# Patient Record
Sex: FEMALE | Race: WHITE
[De-identification: names, ages, dates, MRNs, and addresses within clinical notes are randomized per-mention and may not be internally consistent; named-entity substitution may affect disease eponyms.]

---

## 2017-04-17 ENCOUNTER — HOSPITAL ENCOUNTER (INPATIENT)
Dept: HOSPITAL 77 - KA.MS | Age: 82
LOS: 4 days | Discharge: NURSING, REHAB OR LONG-TERM CARE | DRG: 281 | End: 2017-04-21
Attending: PHYSICIAN ASSISTANT | Admitting: PHYSICIAN ASSISTANT
Payer: MEDICARE

## 2017-04-17 DIAGNOSIS — M31.5: ICD-10-CM

## 2017-04-17 DIAGNOSIS — I50.30: ICD-10-CM

## 2017-04-17 DIAGNOSIS — M54.6: ICD-10-CM

## 2017-04-17 DIAGNOSIS — I10: ICD-10-CM

## 2017-04-17 DIAGNOSIS — Z79.899: ICD-10-CM

## 2017-04-17 DIAGNOSIS — Z86.711: ICD-10-CM

## 2017-04-17 DIAGNOSIS — Z88.0: ICD-10-CM

## 2017-04-17 DIAGNOSIS — L03.115: ICD-10-CM

## 2017-04-17 DIAGNOSIS — B96.89: ICD-10-CM

## 2017-04-17 DIAGNOSIS — Z88.8: ICD-10-CM

## 2017-04-17 DIAGNOSIS — D63.8: ICD-10-CM

## 2017-04-17 DIAGNOSIS — E78.5: ICD-10-CM

## 2017-04-17 DIAGNOSIS — Z79.01: ICD-10-CM

## 2017-04-17 DIAGNOSIS — I51.7: Primary | ICD-10-CM

## 2017-04-17 DIAGNOSIS — F32.9: ICD-10-CM

## 2017-04-17 DIAGNOSIS — I83.019: ICD-10-CM

## 2017-04-17 DIAGNOSIS — Z86.718: ICD-10-CM

## 2017-04-17 DIAGNOSIS — I21.4: ICD-10-CM

## 2017-04-17 DIAGNOSIS — L97.919: ICD-10-CM

## 2017-04-17 DIAGNOSIS — Z79.4: ICD-10-CM

## 2017-04-17 DIAGNOSIS — R53.1: ICD-10-CM

## 2017-04-17 DIAGNOSIS — D64.9: ICD-10-CM

## 2017-04-17 DIAGNOSIS — R07.89: ICD-10-CM

## 2017-04-17 DIAGNOSIS — E03.9: ICD-10-CM

## 2017-04-17 DIAGNOSIS — E78.00: ICD-10-CM

## 2017-04-17 DIAGNOSIS — B95.7: ICD-10-CM

## 2017-04-17 DIAGNOSIS — D46.9: ICD-10-CM

## 2017-04-17 PROCEDURE — 36415 COLL VENOUS BLD VENIPUNCTURE: CPT

## 2017-04-17 PROCEDURE — 86901 BLOOD TYPING SEROLOGIC RH(D): CPT

## 2017-04-17 PROCEDURE — 86922 COMPATIBILITY TEST ANTIGLOB: CPT

## 2017-04-17 PROCEDURE — 97163 PT EVAL HIGH COMPLEX 45 MIN: CPT

## 2017-04-17 PROCEDURE — 86920 COMPATIBILITY TEST SPIN: CPT

## 2017-04-17 PROCEDURE — 84484 ASSAY OF TROPONIN QUANT: CPT

## 2017-04-17 PROCEDURE — 87186 SC STD MICRODIL/AGAR DIL: CPT

## 2017-04-17 PROCEDURE — P9016 RBC LEUKOCYTES REDUCED: HCPCS

## 2017-04-17 PROCEDURE — 36430 TRANSFUSION BLD/BLD COMPNT: CPT

## 2017-04-17 PROCEDURE — 87077 CULTURE AEROBIC IDENTIFY: CPT

## 2017-04-17 PROCEDURE — 86900 BLOOD TYPING SEROLOGIC ABO: CPT

## 2017-04-17 PROCEDURE — 30233N1 TRANSFUSION OF NONAUTOLOGOUS RED BLOOD CELLS INTO PERIPHERAL VEIN, PERCUTANEOUS APPROACH: ICD-10-PCS | Performed by: PHYSICIAN ASSISTANT

## 2017-04-17 PROCEDURE — 97597 DBRDMT OPN WND 1ST 20 CM/<: CPT

## 2017-04-17 PROCEDURE — 86850 RBC ANTIBODY SCREEN: CPT

## 2017-04-17 PROCEDURE — 87070 CULTURE OTHR SPECIMN AEROBIC: CPT

## 2017-04-17 RX ADMIN — HYDROCODONE BITARTRATE AND ACETAMINOPHEN PRN TAB: 10; 325 TABLET ORAL at 18:31

## 2017-04-17 RX ADMIN — Medication SCH TAB: at 18:31

## 2017-04-18 RX ADMIN — ASPIRIN SCH MG: 325 TABLET, DELAYED RELEASE ORAL at 08:44

## 2017-04-18 RX ADMIN — Medication SCH: at 09:31

## 2017-04-18 RX ADMIN — VITAMIN D, TAB 1000IU (100/BT) SCH: 25 TAB at 09:31

## 2017-04-18 RX ADMIN — HYDROCODONE BITARTRATE AND ACETAMINOPHEN PRN TAB: 10; 325 TABLET ORAL at 21:39

## 2017-04-18 RX ADMIN — HYDROCODONE BITARTRATE AND ACETAMINOPHEN PRN TAB: 10; 325 TABLET ORAL at 11:11

## 2017-04-18 RX ADMIN — HYDROCODONE BITARTRATE AND ACETAMINOPHEN PRN TAB: 10; 325 TABLET ORAL at 00:04

## 2017-04-18 RX ADMIN — CYANOCOBALAMIN TAB 500 MCG SCH: 500 TAB at 09:31

## 2017-04-18 RX ADMIN — OMEPRAZOLE SCH MG: 20 CAPSULE, DELAYED RELEASE ORAL at 06:24

## 2017-04-18 RX ADMIN — HYDROCODONE BITARTRATE AND ACETAMINOPHEN PRN TAB: 10; 325 TABLET ORAL at 15:59

## 2017-04-18 RX ADMIN — Medication SCH TAB: at 17:37

## 2017-04-18 NOTE — PN
04/18/2017 PATIENT NAME:  RAJANI ARAIZA

 

CHIEF COMPLAINT:  Some mid anterior substernal chest pain about 2/10, that was

present on admission per the patient report.

 

BRIEF HISTORY:  This elderly female 82 with multiple chronic medical conditions,

was seen and evaluated yesterday at [facility].  She had some concerns of

some feeling weak that she just really could not stand.  She does have a history

this.  She has mild dysplastic syndrome requiring frequent transfusions.  No

other chronic medical conditions.  She described her pain in chest as a

pressure.  She did not have any shortness of breath.  No heart palpitations.  No

nausea.  No radiating symptoms.  Hemoglobin did show an 8.0, so she was admitted

mainly for blood transfusions and a cardiac workup.

 

LAB:  This morning does have a white count of 20,000; hemoglobin 8.7; hematocrit

25.0; MCH 95, which is high; RDW significantly high 27.  .  Troponin

increased to 20.3.  INR 2.5.

 

REVIEW OF SYSTEMS:  CONSTITUTIONAL:  Appears weak, frail, elderly, sitting in

bed, however, polite, no acute distress, lucid, able to cooperate and understand

conversation.

CHEST:  She does have some ongoing chest pain.  Nitroglycerin given and

morphine.  She has no nausea.  No radiating symptoms.  Does complain of some leg

pain.

 

PHYSICAL EXAMINATION:  GENERAL:  The patient is alert and oriented.

VITAL SIGNS:  Blood pressure right now is 116/61, heart rate is around 60,

respiratory rate 14, O2 sats 96%. She is on 2 L.  She is a full code right now.

Weight is 115 pounds.

NECK:  No carotid bruits.

CV:  Grade 3/6 systolic murmur.  S1 and S2.  Regular rhythm.  No carotid bruits

noted.

RESPIRATORY:  Fibrotic crackles.  This is chronic for her.  Good distal pulses,

radial and pedal.  She has no edema.

GI:  Hypotonic bowel tones.

 

DIAGNOSTICS:  EKG this morning, Q-waves appear more inferior leads.  Chest x-ray

shows moderate cardiac enlargement, essentially unchanged.  No overt CHF

component.

 

IMPRESSION/PLAN:  Myocardial infarction, non-ST elevation myocardial infarction

now.  We will continue with aspirin.  Maximum medical conservative treatment as

the patient does have significant comorbidities.  Not likely a candidate at this

time for any invasive strategy.  However, we will determine her ischemia demand.

We will monitor her hemoglobin.  She may need another transfusion in light of

this.  Monitor for any fluid overload.  She may need beta-blocker.  Long

discussion with family and cardiologist on-call, and with the patient.  The

patient right now is in agreement to maximum medical conservative treatment for

evolving myocardial infarction.  We will trend her troponins.  We will have to

get echocardiogram at some point.  She may have to be placed in swing bed status

due to her significant weakness.  We will determine that at a later point.  Now,

we will monitor for any reperfusion arrhythmias.  While on telemetry, repeat EKG

and troponins serialized.  Other significant chronic problems include

myelodysplastic syndrome requiring frequent blood transfusions; cellulitis of

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

chronic anticoagulation; major depression; anemia of chronic disease;

musculoskeletal; thoracic back pain, she is on pain management; polymyalgia

rheumatica; thoracic aortic aneurysm; diastolic heart failure; history of giant

cell arteritis; hypertension; hyperlipidemia; hypothyroidism.

 

OVERALL PLAN:  Monitor ongoing evolving MI.  May start beta-blockers.  We will

see what her blood pressure is today.  Monitor for any reperfusion arrhythmias.

Continue with aspirin.  Keep her on telemetry today.  On discussion with the

family right now, she still wants to be full code.  Risk versus benefit was

explained to her in great detail.  The family will still be in discussion on a

day-by-day basis of this.  I explained this all to her that she is not a

candidate for any major interventions at this time.  Family is in full

cooperation and agreement with this plan.  Dr. Nishant Hurtado, informed.

 

DD:  04/18/2017 12:23:16

DT:  04/18/2017 13:07:10

Job #:  [id number]

## 2017-04-19 RX ADMIN — HYDROCODONE BITARTRATE AND ACETAMINOPHEN PRN TAB: 10; 325 TABLET ORAL at 01:34

## 2017-04-19 RX ADMIN — HYDROCODONE BITARTRATE AND ACETAMINOPHEN PRN TAB: 10; 325 TABLET ORAL at 06:07

## 2017-04-19 RX ADMIN — HYDROCODONE BITARTRATE AND ACETAMINOPHEN PRN TAB: 10; 325 TABLET ORAL at 18:23

## 2017-04-19 RX ADMIN — Medication SCH TAB: at 09:56

## 2017-04-19 RX ADMIN — OMEPRAZOLE SCH MG: 20 CAPSULE, DELAYED RELEASE ORAL at 06:06

## 2017-04-19 RX ADMIN — HYDROCODONE BITARTRATE AND ACETAMINOPHEN PRN TAB: 10; 325 TABLET ORAL at 10:30

## 2017-04-19 RX ADMIN — ASPIRIN SCH: 325 TABLET, DELAYED RELEASE ORAL at 09:12

## 2017-04-19 RX ADMIN — Medication SCH MG: at 09:13

## 2017-04-19 RX ADMIN — POTASSIUM CHLORIDE SCH MEQ: 750 TABLET, FILM COATED, EXTENDED RELEASE ORAL at 09:14

## 2017-04-19 RX ADMIN — VITAMIN D, TAB 1000IU (100/BT) SCH UNITS: 25 TAB at 09:13

## 2017-04-19 RX ADMIN — Medication SCH TAB: at 09:12

## 2017-04-19 RX ADMIN — HYDROCODONE BITARTRATE AND ACETAMINOPHEN PRN TAB: 10; 325 TABLET ORAL at 13:50

## 2017-04-19 RX ADMIN — Medication SCH TAB: at 18:23

## 2017-04-19 RX ADMIN — CYANOCOBALAMIN TAB 500 MCG SCH MCG: 500 TAB at 09:13

## 2017-04-19 NOTE — PN
04/19/2017 PATIENT NAME:  RAJANI ARAIZA

 

CHIEF COMPLAINT:  Right lower lobe extremity pain with wound ulceration.

However, she denies chest pain, denies shortness of breath, denies cough.  Her

denying chest pain is promising.

 

HISTORY:  This 82-year-old female, who does have multiple chronic medical

conditions, was admitted for some weakness.  It turned out she had a myocardial

infarction, a quite severe troponin still around 20.  Yesterday, she had a low-

grade anterior substernal chest wall pain, nonradiating, it was relieved with

nitroglycerin and morphine.

 

PHYSICAL EXAMINATION:  VITAL SIGNS:  Temperature is 97.8, blood pressure 108/58,

heart rate 60, respiratory rate 16 and O2 sats 93%, she is on 2 L.  She is

weighing 119, this is up approximately 4 pounds.

NECK:  She has no carotid bruits.  No JVD.

CV:  Grade 3/6 systolic murmur.  S1 and S2.  She has a regular rhythm.

RESPIRATORY:  Fibrotic crackles, chronic for her.

EXTREMITIES:  Overall general skin condition is warm and perfusing well.  She

does have two ulcerations in her right lower extremity.  I viewed the ulcers

this morning that do have some soft slough noted with no purulent drainage, but

serosanguineous.  Partial thickness noted.  The dressing type is a Hydrogel with

an Aquacel Hydrofiber.  No palpable cord in her right calf.  Fair distal pulses.

No edema noted.

GI:  Hypotonic bowel tones.  Nontender.

 

LABORATORY DATA:  Troponin is 20.5, slightly up from yesterday.  BNP was 938.

Telemetry reports no ectopy noted.

 

ASSESSMENT AND PLAN:

1. Myocardial infarction, now considered non-ST-elevation myocardial

    infarction.  We will reduce aspirin from 325 to 81 and continue with Plavix

    75.  We will start low-dose beta-blocker, a tartrate, 12.5 mg to see how

    her blood pressure does and saline lock her fluids.  She will continue on

    telemetry at this time.  Continue with maximum medical conservative

    treatment.  I do not believe she has any ischemia ongoing at this time.

2. Weakness.  Hold off on any rehab right at this time or swing bed, but this

    was discussed with her.  She may have to be placed in swing bed at a

    certain point.

3. Venous stasis ulcers, right lower extremity.  These are getting debrided by

    Physical Therapy.

 

SECONDARY ASSESSMENT:

1. Diastolic heart failure.  We will have to assess her echocardiogram.  She

    may need repeat since her myocardial infarction.

2. History of cellulitis, right lower extremity.

3. Bilateral pulmonary embolism history.  She is on chronic anticoagulation.

    Therapeutic INR.

4. Major depression.

5. Anemia, which is chronic disease.

6. Thoracic back pain.  She is on pain management contract.

7. Polymyalgia rheumatica.

8. History of thoracic aortic aneurysm.

9. History of giant cell arteritis.  She has no headache at this time.

10.Hypertension history.  Actually, the blood pressure is slightly low.  We

    will start a beta-blocker.

11.Hyperlipidemia.  In light of her myocardial infarction, we will start

    statin therapy independent of her cholesterol numbers as she made gain

    benefit from this.

12.Hypothyroidism.  We will assess TSH level.

 

OVERALL PLAN:  Start statin therapy.  Reduce aspirin.  Saline lock fluids.  20

mg Lasix x1 this morning.  Start a low-dose beta-blocker.  Continue with

telemetry.  Discussion with family this morning, everybody is in agreement to

the patient's plan.  She is continuing to be full code status.

 

DD:  04/19/2017 09:36:48

DT:  04/19/2017 10:30:18

Job #:  [id number]

## 2017-04-20 LAB
CHLORIDE SERPL-SCNC: 105 MMOL/L (ref 98–115)
SODIUM SERPL-SCNC: 140 MMOL/L (ref 136–145)

## 2017-04-20 RX ADMIN — HYDROCODONE BITARTRATE AND ACETAMINOPHEN PRN TAB: 10; 325 TABLET ORAL at 06:22

## 2017-04-20 RX ADMIN — Medication SCH TAB: at 09:00

## 2017-04-20 RX ADMIN — VITAMIN D, TAB 1000IU (100/BT) SCH UNITS: 25 TAB at 09:05

## 2017-04-20 RX ADMIN — OMEPRAZOLE SCH MG: 20 CAPSULE, DELAYED RELEASE ORAL at 06:21

## 2017-04-20 RX ADMIN — Medication SCH MG: at 09:05

## 2017-04-20 RX ADMIN — CYANOCOBALAMIN TAB 500 MCG SCH MCG: 500 TAB at 09:00

## 2017-04-20 RX ADMIN — HYDROCODONE BITARTRATE AND ACETAMINOPHEN PRN TAB: 10; 325 TABLET ORAL at 00:43

## 2017-04-20 RX ADMIN — Medication SCH TAB: at 17:21

## 2017-04-20 RX ADMIN — HYDROCODONE BITARTRATE AND ACETAMINOPHEN PRN TAB: 10; 325 TABLET ORAL at 20:44

## 2017-04-20 NOTE — PN
04/20/2017 PATIENT NAME:  RAJANI ARAIZA

 

CHIEF COMPLAINT:  Today, overall progress.  Feels good.  Slightly weak.  Denies

any chest pain.  No worsening shortness of breath.  Her weight is up

approximately 6 pounds since admission.  Troponin is trending down.  Overall

much improvement.

 

HISTORY:  This 82-year-old female with multiple chronic medical conditions was

admitted initially for weakness.  She sustained a considerable myocardial

infarction.  Troponin was around 20, is now decreasing, so she was admitted for

conservative maximal medical treatment of her myocardial infarction.

 

The patient is full code.

 

PHYSICAL EXAMINATION:  VITAL SIGNS:  Blood pressure 104/50; respiratory rate 19;

O2 sats 95%, this is on 2 L; and temperature is 99.3.

CONSTITUTIONAL:  The patient is alert and oriented.  Sitting in chair.

Conversing with the family.

NECK:  Supple.  No signs of JVD.  No carotid bruits.

CV:  Grade 3/6 systolic murmur.  Normal S1, S2.  Irregular rhythm.

RESPIRATORY:  She has had ongoing chronic fibrotic crackles which are chronic

for her.  She is on Lasix every other day.

GI:  Nontender.  Decent bowel tones.

EXTREMITIES:  Lower extremity; she does have a dressing intact, hydrofiber

dressing to her right lower extremity due to stasis.  Culture sensitive reports

are back.  She has no edema.

 

LABORATORY DATA:

 

 

INR is 2.6.  Sodium 140, potassium 4.2, BUN 30, creatinine 0.78, troponin now is

12.9, and calcium 8.6.  BNP of 938.

 

Telemetry reports; nurses did not report any aberrancy or PVCs; however, they

did hold last night's metoprolol tartrate due to hypotension, asymptomatic.

 

ASSESSMENT AND PLAN:

1. Status post myocardial infarction.  At this time non-ST-elevation

    myocardial infarction.  She is to continue with aspirin 81 mg.  Placed on

    Plavix on admission.  Continue with low-dose beta-blocker tartrate 12.5 mg

    b.i.d.  Holding parameters.  Continue with telemetry.  Echocardiogram, next

    week.  She is fully coagulated with Coumadin.  Starting statin therapy

    today.  NICHOLAS risk score, elevated 6/7; however, we will continue with

    ischemia driven strategy, maximal medical treatment.

2. Weakness.  Physical Therapy consultation has been placed.  Most likely she

    could start physical therapy tomorrow; however, has ongoing wound

    debridement.

3. Venous stasis ulcers.  Right lower extremities.  Physical Therapy is doing

    dressing changes.  Currently, she does have a hydrocele dressing.  Wound

    culture was taken, Klebsiella oxytoca and Staph coagulase are negative.

    Sensitive to ceftriaxone.  We will continue with ceftriaxone.

 

SECONDARY ASSESSMENT:

1. Diastolic heart failure.  Echocardiogram next week.

2. History of cellulitis.

3. Bilateral pulmonary embolism history.  She is on chronic anticoagulation.

    Right now, she has a therapeutic INR.

4. Major depression.  This seems stable.

5. Anemia of chronic disease.

6. Thoracic back pain.  She is currently on a pain management contract.  This

    is well controlled.

7. Polymyalgia rheumatica.

8. History of thoracic aortic aneurysm.

9. History of giant cell arteritis.

10.History of hypertension, currently it is slightly low; however, ongoing

    beta-blockers.

11.Hyperlipidemia.  We will start statin therapy today as she could benefit

    due to recent myocardial infarction.

12.Hypothyroidism.  TSH pending.

 

OVERALL PLAN:  Start statin therapy today.  Lasix 40 mg x1.  Continue with beta-

blocker.  Holding parameters.  Continue with telemetry for 24 more hours.  She

is still a DNR; however, she is doing much better.  We will get an

echocardiogram next week, and most likely, we will place her in swing bed

therapy tomorrow.

 

DD:  04/20/2017 09:52:44

DT:  04/20/2017 10:13:28

Job #:  [id number]

## 2017-04-21 ENCOUNTER — HOSPITAL ENCOUNTER (INPATIENT)
Dept: HOSPITAL 77 - KA.MS | Age: 82
LOS: 14 days | Discharge: HOME | DRG: 947 | End: 2017-05-05
Attending: NURSE PRACTITIONER | Admitting: NURSE PRACTITIONER
Payer: MEDICARE

## 2017-04-21 VITALS — SYSTOLIC BLOOD PRESSURE: 97 MMHG | DIASTOLIC BLOOD PRESSURE: 43 MMHG

## 2017-04-21 DIAGNOSIS — B96.89: ICD-10-CM

## 2017-04-21 DIAGNOSIS — T40.2X5A: ICD-10-CM

## 2017-04-21 DIAGNOSIS — F32.9: ICD-10-CM

## 2017-04-21 DIAGNOSIS — M31.5: ICD-10-CM

## 2017-04-21 DIAGNOSIS — I83.218: ICD-10-CM

## 2017-04-21 DIAGNOSIS — D64.9: ICD-10-CM

## 2017-04-21 DIAGNOSIS — I21.3: ICD-10-CM

## 2017-04-21 DIAGNOSIS — R53.1: Primary | ICD-10-CM

## 2017-04-21 DIAGNOSIS — Z99.81: ICD-10-CM

## 2017-04-21 DIAGNOSIS — M54.6: ICD-10-CM

## 2017-04-21 DIAGNOSIS — E03.9: ICD-10-CM

## 2017-04-21 DIAGNOSIS — Z79.01: ICD-10-CM

## 2017-04-21 DIAGNOSIS — E78.5: ICD-10-CM

## 2017-04-21 DIAGNOSIS — Z79.899: ICD-10-CM

## 2017-04-21 DIAGNOSIS — Z86.711: ICD-10-CM

## 2017-04-21 DIAGNOSIS — K59.03: ICD-10-CM

## 2017-04-21 PROCEDURE — P9016 RBC LEUKOCYTES REDUCED: HCPCS

## 2017-04-21 RX ADMIN — Medication SCH TAB: at 18:15

## 2017-04-21 RX ADMIN — OMEPRAZOLE SCH MG: 20 CAPSULE, DELAYED RELEASE ORAL at 06:56

## 2017-04-21 RX ADMIN — Medication SCH TAB: at 07:55

## 2017-04-21 RX ADMIN — VITAMIN D, TAB 1000IU (100/BT) SCH UNITS: 25 TAB at 08:03

## 2017-04-21 RX ADMIN — HYDROCODONE BITARTRATE AND ACETAMINOPHEN PRN TAB: 10; 325 TABLET ORAL at 16:05

## 2017-04-21 RX ADMIN — Medication SCH MG: at 08:03

## 2017-04-21 RX ADMIN — CYANOCOBALAMIN TAB 500 MCG SCH MCG: 500 TAB at 08:02

## 2017-04-21 RX ADMIN — HYDROCODONE BITARTRATE AND ACETAMINOPHEN PRN TAB: 10; 325 TABLET ORAL at 21:01

## 2017-04-21 RX ADMIN — POTASSIUM CHLORIDE SCH MEQ: 750 TABLET, FILM COATED, EXTENDED RELEASE ORAL at 08:00

## 2017-04-21 RX ADMIN — HYDROCODONE BITARTRATE AND ACETAMINOPHEN PRN TAB: 10; 325 TABLET ORAL at 12:07

## 2017-04-21 RX ADMIN — HYDROCODONE BITARTRATE AND ACETAMINOPHEN PRN TAB: 10; 325 TABLET ORAL at 07:53

## 2017-04-21 NOTE — PCM.DCSUM1
**Discharge Summary





- Hospital Course


Brief History: This 82-year-old female was admitted from the [facility] 

mainly due to weakness and low-grade substernal chest pain and it was noted 

that she did have a myocardial infarction while hospitalized. Her troponin 

level was quite high around 20 and she remained full code. Cardiology [place] [facility] 

call was notified and there was an agreement that she be treated with maximal 

medical conservative treatment.





- Discharge Data


Discharge Date: 04/21/17


Discharge Disposition: DC/Tfer W/I Hosp To Swing 61


Condition: Good





- Patient Summary/Data


Complications: Patient's complications included a quite significant myocardial 

infarction.


Consults: 


 Consultations





04/17/17 14:42


Consult to Physical Therapy [PT Evaluation and Treatment] [CONS] Routine 











Hospital Course: 


Patient's hospital course went fairly well although she did have a significant 

myocardial infarction, however do to comorbidities it was determined best to 

treat the patient for ischemia demand driven protocol with maximal medical 

conservative treatment and the family did agree with this. She did have a low 

blood pressure early on with troponin level of about 20. Beta blockers were 

started and this did improve her blood pressure. She did get quite weak during 

her hospital stay and a physical therapy consult was placed and the patient 

could benefit from strengthening and ambulation. She remained on telemetry and 

we had no reperfusion arrhythmias or PVCs noted. We continued aspirin, and 

Coumadin. Physical therapy did assist with venous stasis ulcer debridement and 

wound debridement. She did have Klebsiella oxytocin staph coagulase negative 

from her venous stasis ulcer right lower extremity and she was treated with 

sensitive to ceftriaxone.  Statin therapy was started








- Discharge Plan


Home Medications: 


 Home Meds





Cholecalciferol (Vitamin D3) [Vitamin D3] 2,000 unit PO DAILY 12/27/13 [History]


Cyanocobalamin (Vitamin B-12) [B-12 Dots] 500 mcg PO MOTUWETHFR@0900 12/27/13 [

History]


Hydrocodone/Acetaminophen [Hydrocodon-Acetaminophn ] 1 tab PO Q6H PRN 12/ 27/13 [History]


Omeprazole 20 mg PO ACBREAKFAST 12/27/13 [History]


Warfarin [Coumadin] 5 mg PO DAILY@1800 09/02/15 [History]


Polyethylene Glycol 3350 [MiraLAX] 1 packet PO DAILY PRN 09/30/15 [History]


Calcium Carbonate/Vitamin D3 [Calcium 600-Vit D3 400 Tablet] 1 tab PO BIDMEALS 

12/01/15 [History]


Melatonin/Pyridoxine HCl (B6) [Melatonin 3 mg Tablet] 3 mg PO BEDTIME PRN 12/01/

15 [History]


Mirtazapine [Remeron] 30 mg PO BEDTIME 04/29/16 [History]


Phytonadione [Vitamin K] 100 mcg PO DAILY 04/29/16 [History]


Pyridoxine HCl [Vitamin B-6] 100 mg PO DAILY 04/29/16 [History]


Lactobacillus Acidophilus [Acidophilus] 1 tab PO DAILY 08/03/16 [History]


predniSONE [Prednisone] 5 mg PO DAILY@1800 08/03/16 [History]


Levothyroxine Sodium [Synthroid] 150 mcg PO ACBREAKFAST 01/20/17 [History]


Methylphenidate HCl [Ritalin] 5 mg PO ACBREAKFAST 01/20/17 [History]


Potassium Chloride [Klor-Con M20] 10 meq PO Q2D 03/25/17 [History]


Denosumab [Prolia] 60 mg SUBCUT ASDIRECTED 04/17/17 [History]


Epoetin Dewayne [Procrit] 20,000 units SUBCUT Q7D 04/17/17 [History]


Epoetin Dewayne [Procrit] 40,000 unit IJ Q7D 04/17/17 [History]


Filgrastim-Sndz [Zarxio] 300 mcg SUBCUT Q7D 04/17/17 [History]


Furosemide [Lasix] 40 mg PO Q2D 04/17/17 [History]











- Discharge Summary/Plan Comment


DC Time >30 min.: Yes


Discharge Summary/Plan Comment: 





FINAL DIAGNOSES


Myocardial infarction, questionable initial STEMI versus NSTEMI, 


Diastolic heart failure, will get echocardiogram next week


Myelodysplastic syndrome


Venous stasis ulcer, right lower extremity,


History of bilateral pulmonary embolism


Chronic anticoagulation


Anemia of chronic disease 


polymyalgia rheumatica


Hyperlipidemia hypertension


Hypothyroidism


Thoracic back pain


Opioid-induced constipation


Depression








- Patient Data


Vitals - Most Recent: 


 Last Vital Signs











Temp  97.9 F   04/21/17 11:00


 


Pulse  56 L  04/21/17 11:00


 


Resp  20   04/21/17 11:00


 


BP  97/43 L  04/21/17 11:00


 


Pulse Ox  97   04/21/17 11:00











Weight - Most Recent: 119 lb 1.6 oz


I&O - Last 24 hours: 


 Intake & Output











 04/20/17 04/21/17 04/21/17





 22:59 06:59 14:59


 


Intake Total 270 50 


 


Output Total 650 400 


 


Balance -380 -350 











Lab Results - Last 24 hrs: 


 Laboratory Results - last 24 hr











  04/21/17 04/21/17 04/21/17 Range/Units





  07:00 07:00 07:00 


 


WBC   4.1 L   (5.0-10.0)  10^3/uL


 


RBC   2.42 L   (3.80-5.50)  10^6/uL


 


Hgb   8.0 L   (12.0-16.0)  g/dL


 


Hct   23.5 L   (37.0-47.0)  %


 


MCV   96.8 H   (82.0-92.0)  fL


 


MCH   33.1 H   (27.0-31.0)  pg


 


MCHC   34.2   (32.0-36.0)  g/dL


 


RDW   27.3 H   (11.5-14.5)  %


 


Plt Count   189   (150-300)  10^3/uL


 


MPV   9.9   (7.4-10.4)  fL


 


Neut % (Auto)   43.5 L   (50.0-70.0)  %


 


Lymph % (Auto)   36.5   (20.0-40.0)  %


 


Mono % (Auto)   9.5 H   (2.0-8.0)  %


 


Eos % (Auto)   9.6 H   (1.0-3.0)  %


 


Baso % (Auto)   0.9   (0.0-1.0)  %


 


Neut # (Auto)   1.8 L   (2.5-7.0)  10^3/uL


 


Lymph # (Auto)   1.5   (1.0-4.0)  10^3/uL


 


Mono # (Auto)   0.4   (0.1-0.8)  10^3/uL


 


Eos # (Auto)   0.4 H   (0.1-0.3)  10^3/uL


 


Baso # (Auto)   0.0   (0.0-0.1)  10^3/uL


 


PT    23.2 H  (8.9-11.4)  SEC


 


INR    2.2 H  (0.9-1.1)  


 


Troponin I  8.19 H*    (0.00-0.070)  ng/mL











Med Orders - Current: 


 Current Medications








Discontinued Medications





Hydrocodone Bitart/Acetaminophen (Norco 325-5 Mg)  1 tab PO ONETIME ONE


   Stop: 04/17/17 14:55


   Last Admin: 04/17/17 15:07 Dose:  1 tab


Hydrocodone Bitart/Acetaminophen (Norco 325-10 Mg)  1 tab PO Q6H PRN


   PRN Reason: Pain


   Last Admin: 04/18/17 11:11 Dose:  1 tab


Hydrocodone Bitart/Acetaminophen (Norco 325-10 Mg)  1 tab PO Q4H PRN


   PRN Reason: Pain


   Last Admin: 04/21/17 12:07 Dose:  1 tab


Aspirin (Aspirin)  324 mg PO ONETIME ONE


   Stop: 04/17/17 14:33


   Last Admin: 04/17/17 14:43 Dose:  324 mg


Aspirin (Ecotrin)  325 mg PO DAILY [facility]


   Last Admin: 04/19/17 09:12 Dose:  Not Given


Aspirin (Ecotrin)  81 mg PO DAILY [facility]


Aspirin (Halfprin)  81 mg PO DAILY [facility]


   Last Admin: 04/21/17 08:00 Dose:  81 mg


Atropine Sulfate (Atropine 0.1 Mg/Ml)  0 mg IVPUSH ASDIRECTED PRN


   PRN Reason: Heart


Calcium Citrate (Calcium Citrate + D)  2 tab PO BIDMEALS [facility]


   Last Admin: 04/21/17 07:55 Dose:  2 tab


Ceftriaxone Sodium (Rocephin)  1 gm IVPUSH Q24H [facility]


   Last Admin: 04/20/17 20:06 Dose:  1 gm


Cholecalciferol (Vitamin D3)  2,000 units PO DAILY [facility]


   Last Admin: 04/21/17 08:03 Dose:  2,000 units


Clopidogrel Bisulfate (Plavix)  75 mg PO DAILY [facility]


   Last Admin: 04/21/17 08:02 Dose:  75 mg


Cyanocobalamin (Vitamin B12)  500 mcg PO MOTUWETHFR@0900 [facility]


   Last Admin: 04/21/17 08:02 Dose:  500 mcg


Epinephrine HCl (Epinephrine 1:10,000)  1 mg IVPUSH ASDIRECTED PRN


   PRN Reason: Heart


Furosemide (Lasix)  40 mg PO Q2D [facility]


   Last Admin: 04/21/17 08:00 Dose:  40 mg


Furosemide (Lasix)  20 mg IVPUSH NOW ONE


   Stop: 04/19/17 10:02


   Last Admin: 04/19/17 10:15 Dose:  20 mg


Furosemide (Lasix)  40 mg IVPUSH NOW ONE


   Stop: 04/20/17 10:01


   Last Admin: 04/20/17 11:05 Dose:  40 mg


Sodium Chloride (Normal Saline)  1,000 mls @ 125 mls/hr IV ASDIRECTED [facility]


   Stop: 04/17/17 17:00


   Last Admin: 04/17/17 16:02 Dose:  125 mls/hr


Sodium Chloride (Normal Saline)  1,000 mls @ 30 mls/hr IV ASDIRECTED [facility]


   Last Admin: 04/17/17 20:09 Dose:  30 mls/hr


Sodium Chloride (Normal Saline)  1,000 mls @ 70 mls/hr IV ASDIRECTED [facility]


   Last Admin: 04/19/17 07:38 Dose:  70 mls/hr


Lactobacillus Acidophilus/Rhamnosus (Multi-Cynthia Plus)  1 cap PO DAILY [facility]


   Last Admin: 04/21/17 08:02 Dose:  1 cap


Levothyroxine Sodium (Synthroid)  150 mcg PO ACBREAKFAST [facility]


   Last Admin: 04/21/17 06:56 Dose:  150 mcg


Lidocaine HCl (Xylocaine 2%)  0 mg IVPUSH ASDIRECTED PRN


   PRN Reason: Heart


Melatonin (Melatonin)  3 mg PO BEDTIME PRN


   PRN Reason: Insomnia


Methylphenidate HCl (Ritalin)  5 mg PO ACBREAKFAST [facility]


   Last Admin: 04/20/17 06:21 Dose:  5 mg


Metoprolol Tartrate (Lopressor)  12.5 mg PO BID [facility]


   Last Admin: 04/21/17 08:01 Dose:  12.5 mg


Mirtazapine (Remeron)  30 mg PO BEDTIME [facility]


   Last Admin: 04/20/17 20:43 Dose:  30 mg


Morphine Sulfate (Morphine)  1 mg IVPUSH Q2H PRN


   PRN Reason: Chest Pain


   Last Admin: 04/18/17 10:07 Dose:  1 mg


Nitroglycerin (Nitrostat)  0.4 mg SL ASDIRECTED PRN


   PRN Reason: Heart


   Last Admin: 04/18/17 08:45 Dose:  0.4 mg


Omeprazole (Omeprazole)  20 mg PO ACBREAKFAST [facility]


   Last Admin: 04/21/17 06:56 Dose:  20 mg


Ondansetron HCl (Zofran)  4 mg IVPUSH Q4H PRN


   PRN Reason: Nausea/Vomiting


Phytonadione (Vitamin K)  100 mcg PO DAILY [facility]


   Last Admin: 04/21/17 08:03 Dose:  100 mcg


Polyethylene Glycol (Miralax)  17 gm PO DAILY PRN


   PRN Reason: Constipation


Potassium Chloride (Klor-Con 10)  10 meq PO Q2D [facility]


   Last Admin: 04/21/17 08:00 Dose:  10 meq


Prednisone (Prednisone)  5 mg PO DAILY@1800 [facility]


   Last Admin: 04/20/17 17:22 Dose:  5 mg


Pyridoxine HCl (Vitamin B6-Pyridoxine)  100 mg PO DAILY [facility]


   Last Admin: 04/21/17 08:03 Dose:  100 mg


Sodium Chloride (Syrex Flush)  5 ml FLUSH Q8HR PRN


   PRN Reason: Keep Vein Open


Sodium Chloride (Syrex Flush)  5 ml FLUSH Q8HR PRN


   PRN Reason: Keep Vein Open


   Last Admin: 04/19/17 20:56 Dose:  5 ml


Warfarin Sodium (Coumadin)  5 mg PO DAILY@1800 [facility]


   Last Admin: 04/20/17 17:22 Dose:  5 mg











*Q Meaningful Use (DIS)





- VTE *Q


VTE Criteria *Q: 








- Stroke *Q


Stroke Criteria *Q: 








- AMI *Q


AMI Criteria *Q:

## 2017-04-21 NOTE — PCM.HP
H&P History of Present Illness





- General


Date of Service: 17


Admit Problem/Dx: 


 Admission Diagnosis/Problem





Admission Diagnosis/Problem      Weakness








Source of Information: Patient, Old records, Provider, RN


History Limitations: Reports: No limitations





- History of Present Illness


Initial Comments - Free Text/Narative: 





This 82-year-old female was initially admitted for chest pain and weakness and 

was determined for her to have a myocardial infarction quite significant. Due 

to her weakness status post MI she was placed in swing bed therapy.





- Related Data


Allergies/Adverse Reactions: 


 Allergies











Allergy/AdvReac Type Severity Reaction Status Date / Time


 


erythromycin base Allergy Severe Vomiting Verified 17 16:42





[Erythromycin Base]     


 


Penicillins Allergy Unknown Hives Verified 17 16:42


 


Sulfa (Sulfonamide Allergy  Other Verified 17 16:42





Antibiotics)     


 


sulfamethoxazole Allergy  Other Verified 17 16:42





[From Bactrim]     


 


trimethoprim [From Bactrim] Allergy  Other Verified 17 16:42


 


lenalidomide [From Revlimid] AdvReac Mild Rash Verified 17 16:42











Home Medications: 


 Home Meds





Cholecalciferol (Vitamin D3) [Vitamin D3] 2,000 unit PO DAILY 13 [History]


Cyanocobalamin (Vitamin B-12) [B-12 Dots] 500 mcg PO MOTUWETHFR@0900 13 [

History]


Hydrocodone/Acetaminophen [Hydrocodon-Acetaminophn ] 1 tab PO Q6H PRN  [History]


Omeprazole 20 mg PO ACBREAKFAST 13 [History]


Warfarin [Coumadin] 5 mg PO DAILY@1800 09/02/15 [History]


Polyethylene Glycol 3350 [MiraLAX] 1 packet PO DAILY PRN 09/30/15 [History]


Calcium Carbonate/Vitamin D3 [Calcium 600-Vit D3 400 Tablet] 1 tab PO BIDMEALS 

12/01/15 [History]


Melatonin/Pyridoxine HCl (B6) [Melatonin 3 mg Tablet] 3 mg PO BEDTIME PRN 12/01/

15 [History]


Mirtazapine [Remeron] 30 mg PO BEDTIME 16 [History]


Phytonadione [Vitamin K] 100 mcg PO DAILY 16 [History]


Pyridoxine HCl [Vitamin B-6] 100 mg PO DAILY 16 [History]


Lactobacillus Acidophilus [Acidophilus] 1 tab PO DAILY 16 [History]


predniSONE [Prednisone] 5 mg PO DAILY@1800 16 [History]


Levothyroxine Sodium [Synthroid] 150 mcg PO ACBREAKFAST 17 [History]


Methylphenidate HCl [Ritalin] 5 mg PO ACBREAKFAST 17 [History]


Potassium Chloride [Klor-Con M20] 10 meq PO Q2D 17 [History]


Denosumab [Prolia] 60 mg SUBCUT ASDIRECTED 17 [History]


Epoetin Dewayne [Procrit] 20,000 units SUBCUT Q7D 17 [History]


Epoetin Dewayne [Procrit] 40,000 unit IJ Q7D 17 [History]


Filgrastim-Sndz [Zarxio] 300 mcg SUBCUT Q7D 17 [History]


Furosemide [Lasix] 40 mg PO Q2D 17 [History]











Past Medical History


HEENT History: Reports: Cataract, Hard of hearing, Impaired vision, Sinusitis


Cardiovascular History: Reports: Blood clots/VTE/DVT, High cholesterol, PVD, 

SOB on exertion


Respiratory History: Reports: Bronchitis, recurrent, PE, Pneumonia, recurrent, 

SOB, Other (see below)


Other Respiratory History: Chronic use of oxygen


Gastrointestinal History: Reports: Bowel obstruction, Chronic diarrhea, Fecal 

incontinence, GERD


Genitourinary History: Reports: UTI, recurrent


OB/GYN History: Reports: Endometriosis, Pregnancy


Musculoskeletal History: Reports: Back pain, chronic, Fibromyalgia, Other (see 

below)


Other Musculoskeletal History: POLYMYALGIA


Neurological History: Reports: Vertigo


Psychiatric History: Reports: None


Endocrine/Metabolic History: Reports: Hypothyroidism, Osteopenia, Osteoporosis


Hematologic History: Reports: Anemia, Blood transfusion(s)


Immunologic History: Reports: None


Oncologic (Cancer) History: Reports: None


Dermatologic History: Reports: None





- Infectious Disease History


Infectious Disease History: Reports: Measles, Mumps





- Past Surgical History


HEENT Surgical History: Reports: Adenoidectomy, Cataract surgery, Tonsillectomy


Cardiovascular Surgical History: Reports: None


Respiratory Surgical History: Reports: None


GI Surgical History: Reports: Appendectomy, Cholecystectomy, Colonoscopy, Other 

(see below)


Other GI Surgeries/Procedures: bowel obstruction


Female  Surgical History: Reports: Hysterectomy


Endocrine Surgical History: Reports: None


Neurological Surgical History: Reports: None


Musculoskeletal Surgical History: Reports: None


Oncologic Surgical History: Reports: None





Social & Family History





- Family History


Family Medical History: Noncontributory


HEENT: Reports: None


Cardiac: Reports: None


Respiratory: Reports: None


GI: Reports: None


: Reports: Other (see below) (Mother  with kidney disease unknown type

)


OBGYN: Reports: None


Musculoskeletal: Reports: None


Neurological: Reports: None


Psychiatric: Reports: None


Endocrine/Metabolic: Reports: None


Hematologic: Reports: None


Immunologic: Reports: None


Dermatologic: Reports: None


Oncologic: Reports: Hodgkin's lymphoma





- Tobacco Use


Smoking Status *Q: Never Smoker


Second Hand Smoke Exposure: No





- Caffeine Use


Caffeine Use: Reports: Coffee





- Alcohol Use


Days Per Week of Alcohol Use: 0





- Recreational Drug Use


Recreational Drug Use: No





H&P Review of Systems





- Review of Systems:


Review Of Systems: See Below


General: Reports: weakness, fatigue, weight loss (Do to diuretic therapy)


HEENT: Reports: no symptoms


Pulmonary: Reports: Shortness of Breath.  Denies: Cough, Sputum


Cardiovascular: Reports: dyspnea on exertion.  Denies: chest pain, edema, blood 

pressure problem


Gastrointestinal: Reports: Constipation


Genitourinary: Reports: no symptoms


Musculoskeletal: Reports: leg pain (Leg pain due to wound much improved)


Skin: Reports: wound (Right lower extremity)


Psychiatric: Reports: no symptoms


Neurological: Reports: Difficulty Walking, Weakness.  Denies: Dizziness


Hematologic/Lymphatic: Reports: anemia, easy bleeding


Immunologic: Reports: no symptoms





Exam





- Exam


Exam: See Below





- Vital Signs


Vital Signs: 


 Last Vital Signs











Temp  97.9 F   17 11:00


 


Pulse  56 L  17 11:00


 


Resp  20   17 11:00


 


BP  97/43 L  17 11:00


 


Pulse Ox  97   17 11:00











Weight: 119 lb 1.6 oz





- Exam


Quality Assessment: supplemental oxygen (Has been on chronic home oxygen for 

about 6 weeks)


General: alert, oriented, cooperative.  No: mild distress


HEENT: PERRLA, Hearing intact, Mucosa moist & pink, Nares patent, Normal nasal 

septum, Posterior pharynx clear, Conjunctiva clear, EOMI, EACs clear, TMs clear


Neck: supple, trachea midline.  No: JVD


Lungs: Crackles (Chronic fibrotic crackles).  No: Rhonchi, Wheezing


Cardiovascular: regular rate, regular rhythm, systolic murmur (3/6 systolic 

murmur).  No: tachycardia


Abdomen: hypoactive bowel sounds


 (Female) Exam: Deferred


Rectal (Female) Exam: Deferred


Back Exam: No: CVA tenderness (L), CVA tenderness (R)


Extremities: normal pulses


Peripheral Pulses: 2+: radial (L), radial (R)


Skin: wound, incision (Healings venous stasis ulcer, requiring debridement 

little drainage)


Neurological: reflexes equal bilateral, normal speech, sensation intact


Neuro Extensive - Mental Status: alert, oriented x3, normal mood/affect, normal 

cognition


Neuro Extensive - Motor, Sensory, Reflexes: CN II-XII intact.  No: normal gait, 

ataxia, hemiplagia (L), hemiplagia (R)


Psychiatric: alert, normal affect, normal mood





*Q Meaningful Use (ADM)





- VTE *Q


VTE Criteria *Q: 








- Stroke *Q


Stroke Criteria *Q: 








- AMI *Q


AMI Criteria *Q: 





Problem List Initiated/Reviewed/Updated: Yes


Orders Last 24hrs: 


 Active Orders 24 hr











 Category Date Time Status


 


 Patient Status [ADT] Routine ADT  17 13:05 Ordered


 


 Communication Order [RC] DAILY Care  17 12:58 Active


 


 Communication Order [RC] Q8HR Care  17 12:58 Active


 


 Intake and Output [RC] 1400,2200,0600 Care  17 12:58 Active


 


 Oxygen Therapy [RC] PRN Care  17 12:58 Active


 


 RT Incentive Spirometry [RC] ASDIRECTED Care  17 12:58 Active


 


 Supplement (Dietary) [Dietary Supplements] [RC] 1400, Care  17 12:58 

Active





 2200   


 


 Up With Assistance [RC] ASDIRECTED Care  17 12:58 Active


 


 Consult to Physical Therapy [PT Evaluation and Cons  17 12:58 Active





 Treatment] [CONS] Routine   


 


 Heart Healthy Diet [DIET] Diet  17 Lunch Active


 


 Acetaminophen/HYDROcodone [Norco 325-10 MG] Med  17 12:58 Ordered





 1 tab PO Q4H PRN   


 


 Aspirin [Halfprin] Med  17 09:00 Ordered





 81 mg PO DAILY   


 


 B.Bif/B.Long/L.Acidoph/L.Rhamn [Multi-Cynthia Plus] Med  17 09:00 Ordered





 1 cap PO DAILY   


 


 Calcium Citrate/Vitamin D3 [Calcium Citrate + D] Med  17 18:00 Ordered





 2 tab PO BIDMEALS   


 


 Cholecalciferol (Vitamin D3) [Vitamin D3] Med  17 09:00 Ordered





 2,000 units PO DAILY   


 


 Clopidogrel [Plavix] Med  17 09:00 Ordered





 75 mg PO DAILY   


 


 Cyanocobalamin (Vitamin B12) [Vitamin B12] Med  17 09:00 Ordered





 500 mcg PO MOTUWETHFR@0900   


 


 Furosemide [Lasix] Med  17 09:00 Ordered





 40 mg PO Q2D   


 


 Levothyroxine [Synthroid] Med  17 07:00 Ordered





 150 mcg PO ACBREAKFAST   


 


 Melatonin Med  17 12:58 Ordered





 3 mg PO BEDTIME PRN   


 


 Metoprolol Tartrate [Lopressor] Med  17 21:00 Ordered





 12.5 mg PO BID   


 


 Mirtazapine [Remeron] Med  17 21:00 Ordered





 30 mg PO BEDTIME   


 


 Morphine Med  17 12:58 Ordered





 1 mg IVPUSH Q2H PRN   


 


 Omeprazole Med  17 07:00 Ordered





 20 mg PO ACBREAKFAST   


 


 Ondansetron [Zofran] Med  17 12:58 Ordered





 4 mg IVPUSH Q4H PRN   


 


 Phytonadione [Vitamin K] Med  17 09:00 Ordered





 100 mcg PO DAILY   


 


 Polyethylene Glycol 3350 [MiraLAX] Med  17 12:58 Ordered





 17 gm PO DAILY PRN   


 


 Potassium Chloride [Klor-Con 10] Med  17 09:00 Ordered





 10 meq PO Q2D   


 


 Sodium Chloride 0.9% [Syrex Flush] Med  17 12:58 Ordered





 5 ml FLUSH Q8HR PRN   


 


 Vitamin B6-pyridOXINE Med  17 09:00 Ordered





 100 mg PO DAILY   


 


 Warfarin [Coumadin] Med  17 18:00 Ordered





 5 mg PO DAILY@1800   


 


 cefTRIAXone [Rocephin] Med  17 20:15 Ordered





 1 gm IVPUSH Q24H   


 


 predniSONE Med  17 18:00 Ordered





 5 mg PO DAILY@1800   


 


 Convert IV to Saline Lock [OM.PC] Routine Oth  17 12:58 Ordered


 


 Saline Lock Insert [OM.PC] Routine Oth  17 12:58 Ordered


 


 Resuscitation Status Routine Resus Stat  17 13:04 Ordered








 Medication Orders





Hydrocodone Bitart/Acetaminophen (Norco 325-10 Mg)  1 tab PO Q4H PRN


   PRN Reason: Pain


Aspirin (Halfprin)  81 mg PO DAILY [facility]


Calcium Citrate (Calcium Citrate + D)  2 tab PO BIDMEALS [facility]


Ceftriaxone Sodium (Rocephin)  1 gm IVPUSH Q24H [facility]


Cholecalciferol (Vitamin D3)  2,000 units PO DAILY [facility]


Clopidogrel Bisulfate (Plavix)  75 mg PO DAILY [facility]


Cyanocobalamin (Vitamin B12)  500 mcg PO MOTUWETHFR@0900 [facility]


Furosemide (Lasix)  40 mg PO Q2D [facility]


Lactobacillus Acidophilus/Rhamnosus (Multi-Cynthia Plus)  1 cap PO DAILY [facility]


Levothyroxine Sodium (Synthroid)  150 mcg PO ACBREAKFAST [facility]


Melatonin (Melatonin)  3 mg PO BEDTIME PRN


   PRN Reason: Insomnia


Metoprolol Tartrate (Lopressor)  12.5 mg PO BID [facility]


Mirtazapine (Remeron)  30 mg PO BEDTIME [facility]


Morphine Sulfate (Morphine)  1 mg IVPUSH Q2H PRN


   PRN Reason: Chest Pain


Omeprazole (Omeprazole)  20 mg PO ACBREAKFAST [facility]


Ondansetron HCl (Zofran)  4 mg IVPUSH Q4H PRN


   PRN Reason: Nausea/Vomiting


Phytonadione (Vitamin K)  100 mcg PO DAILY [facility]


Polyethylene Glycol (Miralax)  17 gm PO DAILY PRN


   PRN Reason: Constipation


Potassium Chloride (Klor-Con 10)  10 meq PO Q2D [facility]


Prednisone (Prednisone)  5 mg PO DAILY@1800 [facility]


Pyridoxine HCl (Vitamin B6-Pyridoxine)  100 mg PO DAILY [facility]


Sodium Chloride (Syrex Flush)  5 ml FLUSH Q8HR PRN


   PRN Reason: Keep Vein Open


Warfarin Sodium (Coumadin)  5 mg PO DAILY@1800 [facility]








Assessment/Plan Comment:: 


Brief HISTORY OF PRESENT ILLNESS


This 82-year-old female was initially admitted for chest pain and weakness and 

was determined for her to have a myocardial infarction quite significant. Due 

to her weakness status post MI she was placed in swing bed therapy.








HOSPITAL COURSE WHILE ACUTE CARE STATUS


Patient's hospital course went fairly well although she did have a significant 

myocardial infarction, however do to comorbidities it was determined best to 

treat the patient for ischemia demand driven protocol with maximal medical 

conservative treatment and the family did agree with this. She did have a low 

blood pressure early on with troponin level of about 20. Beta blockers were 

started and this did improve her blood pressure. She did get quite weak during 

her hospital stay and a physical therapy consult was placed and the patient 

could benefit from strengthening and ambulation. She remained on telemetry and 

we had no reperfusion arrhythmias or PVCs noted. We continued aspirin, and 

Coumadin. Physical therapy did assist with venous stasis ulcer debridement and 

wound debridement. She did have Klebsiella oxytocin staph coagulase negative 

from her venous stasis ulcer right lower extremity and she was treated with 

sensitive to ceftriaxone.  Statin therapy was started. She did have some mild 

weight gain with slight fluid overload about 7 pounds gained however diuresis 

him gently this has come down quite a bit.


________________________________________________________________


CODE STATUS full code





IMPRESSION/PLAN





Myocardial infarction, post stabilization, on aspirin, anticoagulated with 

Coumadin due to history of PE. Started Plavix. NICHOLAS risk score 6/7. Started low-

dose beta blocker tolerating well--holding parameters. Continue with telemetry. 

Echocardiogram scheduled next week--Galo. Will consider ACE inhibitor.  

Continue with diuresing--gentle.  Do to him I could benefit from statin therapy-

-seems to be tolerating new statin well





Weakness, physical therapy consultation placed. Patient will benefit from swing 

bed therapy for gentle cardiac rehabilitation, ambulation and strength along 

with wound care





Infection venous stasis ulcers, right lower extremity, definitive therapy with 

ceftriaxone due to Klebsiella oxytoca.  





Hypothyroidism, TSH pending








CHRONIC MEDICAL PROBLEMS,


History of cellulitis


History of bilateral pulmonary embolism, on chronic anticoagulation--no 

complication


Depression, seems stable anemia, chronic disease


Thoracic back pain, currently on pain management contract, opioid use.  No abuse


Opioid-induced constipation, placed on MovanHypertension, placed on low-dose 

beta blocker, will titrate upwards toward blood pressure response


Polymyalgia rheumatica 


History of giant cell arteritis

## 2017-04-22 LAB
CHLORIDE SERPL-SCNC: 104 MMOL/L (ref 98–115)
SODIUM SERPL-SCNC: 139 MMOL/L (ref 136–145)

## 2017-04-22 RX ADMIN — Medication SCH MG: at 08:41

## 2017-04-22 RX ADMIN — HYDROCODONE BITARTRATE AND ACETAMINOPHEN PRN TAB: 10; 325 TABLET ORAL at 17:24

## 2017-04-22 RX ADMIN — VITAMIN D, TAB 1000IU (100/BT) SCH UNITS: 25 TAB at 08:45

## 2017-04-22 RX ADMIN — Medication SCH TAB: at 08:41

## 2017-04-22 RX ADMIN — Medication SCH TAB: at 17:25

## 2017-04-22 RX ADMIN — HYDROCODONE BITARTRATE AND ACETAMINOPHEN PRN TAB: 10; 325 TABLET ORAL at 09:10

## 2017-04-22 RX ADMIN — HYDROCODONE BITARTRATE AND ACETAMINOPHEN PRN TAB: 10; 325 TABLET ORAL at 20:57

## 2017-04-22 RX ADMIN — HYDROCODONE BITARTRATE AND ACETAMINOPHEN PRN TAB: 10; 325 TABLET ORAL at 13:12

## 2017-04-22 NOTE — PCM.PN
- General Info


Date of Service: 04/22/17


Admission Dx/Problem (Free Text): 


 Admission Diagnosis/Problem





Admission Diagnosis/Problem      Weakness





History of present illness:





Patient is an 82-year-old white female who was admitted to acute care secondary 

to chest pain and weakness-Patient sustained a myocardial infarction-quite 

significant. Do 2 or secondary weakness post acute MI she was admitted to 

skilled care/swing bed for further rehabilitation.





Patient's hospital course went fairly well-significant acute myocardial 

infarction but patient has significant comorbidities therefore patient treated 

for ischemia demand driven protocol with maximal medical conservative treatment 

and patient/family agreed with this treatment regimen. She did have lower blood 

pressures early on with troponin level of about 20. Beta blockers were started 

and this did help. She did get very weak during her hospital stay and a 

physical therapy consult was ordered for further strengthening and ambulation/

rehabilitation. Telemetry revealed no reperfusion arrhythmias or PVCs noted. 

Aspirin was continued as well as Coumadin. Physical therapy assisted with 

venous stasis ulcer debridement and wound debridement. She did have bacterial 

growth from her venous stasis ulcer right lower extremity and was treated with 

ceftriaxone. Statin therapy was started She did have some mild weight gain with 

slight fluid overload however diuresis resulted in decreasing the somewhat.





Again, patient admitted to skilled care/swing bed for further close monitoring 

evaluation and treatment and physical therapy for strength building and 

rehabilitation.





Patient noted to be quite anemic-received one blood transfusion on 4/21/17





________________________________________________________________________________

________





Patient reports today:


Very weak yet but does think she is getting a little stronger


No real appetite


Denied any chest pain, shortness breath, palpitations, abdominal pain








Nurses report today:


Need followup lab








Functional Status: Reports: pain controlled, tolerating diet





- Review of Systems


General: Reports: Weakness, Fatigue, Malaise, Appetite (decreased).  Denies: 

Fever, Chills


HEENT: Reports: no symptoms


Pulmonary: Reports: no symptoms


Cardiovascular: Reports: No Symptoms


Gastrointestinal: Reports: No symptoms


Genitourinary: Reports: no symptoms


Neurological: Reports: No Symptoms, Weakness (profound)


Psychiatric: Reports: no symptoms





- Patient Data


Vitals - most recent: 


 Last Vital Signs











Temp  97.5 F   04/22/17 06:48


 


Pulse  100   04/22/17 08:43


 


Resp  20   04/22/17 06:48


 


BP  93/51 L  04/22/17 08:43


 


Pulse Ox  94 L  04/22/17 06:48











Weight - most recent: 119 lb 1.6 oz


I&O - last 24 hours: 


 Intake & Output











 04/21/17 04/22/17 04/22/17





 22:59 06:59 14:59


 


Intake Total 795 50 580


 


Output Total 200 1000 200


 


Balance 595 -950 380











Lab Results last 24 hrs: 


 Laboratory Results - last 24 hr











  04/21/17 Range/Units





  07:00 


 


Blood Type  O NEGATIVE  


 


Gel Antibody Screen  Negative  


 


Crossmatch  See Detail  











Med Orders - Current: 


 Current Medications





Hydrocodone Bitart/Acetaminophen (Norco 325-10 Mg)  1 tab PO Q4H PRN


   PRN Reason: Pain


   Last Admin: 04/22/17 13:12 Dose:  1 tab


Aspirin (Halfprin)  81 mg PO DAILY [facility]


   Last Admin: 04/22/17 08:44 Dose:  81 mg


Calcium Citrate (Calcium Citrate + D)  2 tab PO BIDMEALS [facility]


   Last Admin: 04/22/17 08:41 Dose:  1 tab


Ceftriaxone Sodium (Rocephin)  1 gm IVPUSH Q24H [facility]


   Last Admin: 04/21/17 21:02 Dose:  1 gm


Cholecalciferol (Vitamin D3)  2,000 units PO DAILY [facility]


   Last Admin: 04/22/17 08:45 Dose:  2,000 units


Clopidogrel Bisulfate (Plavix)  75 mg PO DAILY [facility]


   Last Admin: 04/22/17 08:45 Dose:  75 mg


Cyanocobalamin (Vitamin B12)  500 mcg PO MOTUWETHFR@0900 [facility]


Furosemide (Lasix)  40 mg PO Q2D [facility]


Sodium Chloride (Normal Saline)  100 mls @ 20 mls/hr IV ONETIME@1400 [facility]


   Stop: 04/22/17 18:59


Lactobacillus Acidophilus/Rhamnosus (Multi-Cynthia Plus)  1 cap PO DAILY [facility]


   Last Admin: 04/22/17 08:45 Dose:  1 cap


Levothyroxine Sodium (Synthroid)  150 mcg PO ACBREAKFAST [facility]


   Last Admin: 04/22/17 06:01 Dose:  150 mcg


Melatonin (Melatonin)  3 mg PO BEDTIME PRN


   PRN Reason: Insomnia


Metoprolol Tartrate (Lopressor)  12.5 mg PO BID [facility]


   Last Admin: 04/22/17 08:43 Dose:  12.5 mg


Mirtazapine (Remeron)  30 mg PO BEDTIME [facility]


   Last Admin: 04/21/17 20:59 Dose:  30 mg


Morphine Sulfate (Morphine)  1 mg IVPUSH Q2H PRN


   PRN Reason: Chest Pain


Ondansetron HCl (Zofran)  4 mg IVPUSH Q4H PRN


   PRN Reason: Nausea/Vomiting


Pantoprazole [facility] ([facility]***)  [facility]


   Last Admin: 04/22/17 06:02 Dose:  40 mg


Phytonadione (Vitamin K)  100 mcg PO DAILY [facility]


   Last Admin: 04/22/17 08:45 Dose:  100 mcg


Polyethylene Glycol (Miralax)  17 gm PO DAILY PRN


   PRN Reason: Constipation


Potassium Chloride (Klor-Con 10)  10 meq PO Q2D [facility]


Prednisone (Prednisone)  5 mg PO DAILY@1800 [facility]


   Last Admin: 04/21/17 18:16 Dose:  5 mg


Pyridoxine HCl (Vitamin B6-Pyridoxine)  100 mg PO DAILY [facility]


   Last Admin: 04/22/17 08:41 Dose:  100 mg


Sodium Chloride (Syrex Flush)  5 ml FLUSH Q8HR PRN


   PRN Reason: Keep Vein Open


Warfarin Sodium (Coumadin)  5 mg PO DAILY@1800 [facility]


   Last Admin: 04/21/17 18:15 Dose:  5 mg





Discontinued Medications





Omeprazole (Omeprazole)  20 mg PO ACBREAKFAST [facility]











- Exam


General: alert, oriented, cooperative, no acute distress, other (very weak, thin

, pale)


HEENT: Pupils equal, Pupils reactive, EOMI, Mucous membr. moist/pink, Other (

wears glasses)


Neck: supple, trachea midline, no JVD, no thyromegaly.  No: lymphadenopathy, 

thyromegaly


Lungs: Normal respiratory effort, Decreased breath sounds, Crackles


Cardiovascular: Regular Rhythm, Irregular Rhythm, Murmurs


Abdomen: bowel sounds present, soft, no tenderness, no distension.  No: rigidity

, rebound, guarding, tenderness, distension


Extremities: no edema


Skin: warm, dry, intact


Wound/Incisions: other (right lower extremity dressing intact--due for change 

Monday)


Neurological: no new focal deficit, other (profound generalized weakness)


Psy/Mental Status: alert, normal affect, normal mood





- Problem List Review


Problem List Initiated/Reviewed/Updated: Yes





- My Orders


Last 24 Hours: 


My Active Orders





04/22/17 14:10


CBC WITH AUTO DIFF [HEME] Routine 


COMPREHENSIVE METABOLIC PN,CMP [CHEM] Routine 














- Assessment


Assessment:: 








Assessment:





Generalized weakness and need for strength building and rehabilitation 

secondary to below-profound weakness


Acute myocardial infarction-clinically doing fairly well


Chronic anticoagulation therapy-multiple-tolerating well


Previous history of pulmonary embolus


Hyperlipidemia


Antilipemic therapy


Venous stasis ulcers with secondary infection


Cellulitis history


Depression


Anemia of chronic disease-await repeat lab


Chronic back pain-especially thoracic


Chronic constipation-opioid-induced


Polymyalgia rheumatica


Giant cell arteritis history


Hypothyroidism








- Plan


Plan:: 





Plan:


Reviewed present treatment regimen-continue same regimen except changes as 

listed below


CBC, BMP today


CBC in a.m.


Continue physical therapy


Talked with patient and family and he agree with this evaluation and treatment 

plan

## 2017-04-23 RX ADMIN — HYDROCODONE BITARTRATE AND ACETAMINOPHEN PRN TAB: 10; 325 TABLET ORAL at 06:11

## 2017-04-23 RX ADMIN — Medication SCH TAB: at 18:02

## 2017-04-23 RX ADMIN — HYDROCODONE BITARTRATE AND ACETAMINOPHEN PRN TAB: 10; 325 TABLET ORAL at 20:35

## 2017-04-23 RX ADMIN — HYDROCODONE BITARTRATE AND ACETAMINOPHEN PRN TAB: 10; 325 TABLET ORAL at 10:15

## 2017-04-23 RX ADMIN — HYDROCODONE BITARTRATE AND ACETAMINOPHEN PRN TAB: 10; 325 TABLET ORAL at 15:30

## 2017-04-23 RX ADMIN — Medication SCH TAB: at 09:02

## 2017-04-23 RX ADMIN — POTASSIUM CHLORIDE SCH MEQ: 750 TABLET, FILM COATED, EXTENDED RELEASE ORAL at 09:04

## 2017-04-23 RX ADMIN — VITAMIN D, TAB 1000IU (100/BT) SCH UNITS: 25 TAB at 09:02

## 2017-04-23 RX ADMIN — Medication SCH MG: at 09:03

## 2017-04-24 RX ADMIN — HYDROCODONE BITARTRATE AND ACETAMINOPHEN PRN TAB: 10; 325 TABLET ORAL at 14:00

## 2017-04-24 RX ADMIN — Medication SCH TAB: at 09:11

## 2017-04-24 RX ADMIN — Medication SCH TAB: at 18:03

## 2017-04-24 RX ADMIN — HYDROCODONE BITARTRATE AND ACETAMINOPHEN PRN TAB: 10; 325 TABLET ORAL at 21:21

## 2017-04-24 RX ADMIN — VITAMIN D, TAB 1000IU (100/BT) SCH UNITS: 25 TAB at 09:12

## 2017-04-24 RX ADMIN — Medication SCH TAB: at 18:07

## 2017-04-24 RX ADMIN — HYDROCODONE BITARTRATE AND ACETAMINOPHEN PRN TAB: 10; 325 TABLET ORAL at 06:28

## 2017-04-24 RX ADMIN — Medication SCH MG: at 09:11

## 2017-04-24 RX ADMIN — CYANOCOBALAMIN TAB 500 MCG SCH MCG: 500 TAB at 09:10

## 2017-04-25 RX ADMIN — Medication SCH TAB: at 16:59

## 2017-04-25 RX ADMIN — CYANOCOBALAMIN TAB 500 MCG SCH MCG: 500 TAB at 08:25

## 2017-04-25 RX ADMIN — HYDROCODONE BITARTRATE AND ACETAMINOPHEN PRN TAB: 10; 325 TABLET ORAL at 16:58

## 2017-04-25 RX ADMIN — HYDROCODONE BITARTRATE AND ACETAMINOPHEN PRN TAB: 10; 325 TABLET ORAL at 21:14

## 2017-04-25 RX ADMIN — POTASSIUM CHLORIDE SCH MEQ: 750 TABLET, FILM COATED, EXTENDED RELEASE ORAL at 08:26

## 2017-04-25 RX ADMIN — HYDROCODONE BITARTRATE AND ACETAMINOPHEN PRN TAB: 10; 325 TABLET ORAL at 10:36

## 2017-04-25 RX ADMIN — VITAMIN D, TAB 1000IU (100/BT) SCH UNITS: 25 TAB at 08:25

## 2017-04-25 RX ADMIN — Medication SCH MG: at 08:25

## 2017-04-25 RX ADMIN — Medication SCH TAB: at 08:26

## 2017-04-26 RX ADMIN — VITAMIN D, TAB 1000IU (100/BT) SCH UNITS: 25 TAB at 09:09

## 2017-04-26 RX ADMIN — Medication SCH MG: at 09:09

## 2017-04-26 RX ADMIN — Medication SCH TAB: at 18:52

## 2017-04-26 RX ADMIN — HYDROCODONE BITARTRATE AND ACETAMINOPHEN PRN TAB: 10; 325 TABLET ORAL at 09:36

## 2017-04-26 RX ADMIN — CYANOCOBALAMIN TAB 500 MCG SCH MCG: 500 TAB at 09:11

## 2017-04-26 RX ADMIN — Medication SCH TAB: at 09:09

## 2017-04-26 RX ADMIN — HYDROCODONE BITARTRATE AND ACETAMINOPHEN PRN TAB: 10; 325 TABLET ORAL at 14:06

## 2017-04-26 NOTE — PCM.PN
- General Info


Date of Service: 04/26/17





- Review of Systems


General: Reports: Weakness.  Denies: Fever, Chills, Night Sweats


HEENT: Reports: no symptoms


Pulmonary: Denies: shortness of breath, cough, sputum


Cardiovascular: Reports: Dyspnea on Exertion.  Denies: Chest Pain, Edema


Gastrointestinal: Reports: No symptoms


Genitourinary: Reports: no symptoms


Musculoskeletal: Reports: no symptoms


Skin: Reports: pallor


Neurological: Reports: Difficulty Walking (uses walker. ), Weakness, Gait 

Disturbance


Psychiatric: Reports: no symptoms





- Patient Data


Vitals - most recent: 


 Last Vital Signs











Temp  96.7 F   04/26/17 06:10


 


Pulse  60   04/26/17 09:10


 


Resp  18   04/26/17 06:10


 


BP  114/54 L  04/26/17 09:10


 


Pulse Ox  95   04/26/17 08:45











Weight - most recent: 119 lb 1.6 oz


I&O - last 24 hours: 


 Intake & Output











 04/25/17 04/26/17 04/26/17





 22:59 06:59 14:59


 


Intake Total 450 100 


 


Output Total 300 400 


 


Balance 150 -300 











Lab Results last 24 hrs: 


 Laboratory Results - last 24 hr











  04/24/17 04/26/17 Range/Units





  09:05 07:05 


 


PT   27.7 H  (8.9-11.4)  SEC


 


INR   2.6 H  (0.9-1.1)  


 


Blood Type  Cancelled   


 


Gel Antibody Screen  Cancelled   


 


Crossmatch  See Detail   











Med Orders - Current: 


 Current Medications





Hydrocodone Bitart/Acetaminophen (Norco 325-10 Mg)  1 tab PO Q4H PRN


   PRN Reason: Pain


   Last Admin: 04/26/17 09:36 Dose:  1 tab


Aspirin (Halfprin)  81 mg PO DAILY [facility]


   Last Admin: 04/26/17 09:09 Dose:  81 mg


Calcium Citrate (Calcium Citrate + D)  1 tab PO BIDMEALS [facility]


   Last Admin: 04/26/17 09:09 Dose:  1 tab


Ceftriaxone Sodium (Rocephin)  1 gm IVPUSH Q24H [facility]


   Last Admin: 04/25/17 20:10 Dose:  1 gm


Cholecalciferol (Vitamin D3)  2,000 units PO DAILY [facility]


   Last Admin: 04/26/17 09:09 Dose:  2,000 units


Clopidogrel Bisulfate (Plavix)  75 mg PO DAILY [facility]


   Last Admin: 04/26/17 09:11 Dose:  75 mg


Cyanocobalamin (Vitamin B12)  500 mcg PO MOTUWETHFR@0900 [facility]


   Last Admin: 04/26/17 09:11 Dose:  500 mcg


Furosemide (Lasix)  40 mg PO Q2D [facility]


   Last Admin: 04/25/17 08:27 Dose:  40 mg


Lactobacillus Acidophilus/Rhamnosus (Multi-Cynthia Plus)  1 cap PO DAILY [facility]


   Last Admin: 04/26/17 09:10 Dose:  1 cap


Levothyroxine Sodium (Synthroid)  125 mcg PO ACBREAKFAST [facility]


   Last Admin: 04/26/17 06:06 Dose:  125 mcg


Melatonin (Melatonin)  3 mg PO BEDTIME PRN


   PRN Reason: Insomnia


   Last Admin: 04/26/17 01:30 Dose:  3 mg


Metoprolol Tartrate (Lopressor)  12.5 mg PO BID [facility]


   Last Admin: 04/26/17 09:10 Dose:  12.5 mg


Mirtazapine (Remeron)  30 mg PO BEDTIME [facility]


   Last Admin: 04/25/17 21:12 Dose:  30 mg


Morphine Sulfate (Morphine)  1 mg IVPUSH Q2H PRN


   PRN Reason: Chest Pain


Ondansetron HCl (Zofran)  4 mg IVPUSH Q4H PRN


   PRN Reason: Nausea/Vomiting


Pantoprazole Sodium (Protonix***)  40 mg PO 0900 [facility]


   Last Admin: 04/26/17 09:11 Dose:  40 mg


Phytonadione (Vitamin K)  100 mcg PO DAILY [facility]


   Last Admin: 04/26/17 09:11 Dose:  100 mcg


Polyethylene Glycol (Miralax)  17 gm PO DAILY PRN


   PRN Reason: Constipation


Potassium Chloride (Klor-Con 10)  10 meq PO Q2D [facility]


   Last Admin: 04/25/17 08:26 Dose:  10 meq


Prednisone (Prednisone)  5 mg PO DAILY@1800 [facility]


   Last Admin: 04/25/17 16:59 Dose:  5 mg


Pyridoxine HCl (Vitamin B6-Pyridoxine)  100 mg PO DAILY [facility]


   Last Admin: 04/26/17 09:09 Dose:  100 mg


Sodium Chloride (Syrex Flush)  5 ml FLUSH Q8HR PRN


   PRN Reason: Keep Vein Open


   Last Admin: 04/25/17 20:17 Dose:  5 ml





Discontinued Medications





Calcium Citrate (Calcium Citrate + D)  2 tab PO BIDMEALS [facility]


   Last Admin: 04/24/17 18:07 Dose:  1 tab


Sodium Chloride (Normal Saline)  100 mls @ 20 mls/hr IV ONETIME@1400 [facility]


   Stop: 04/22/17 18:59


   Last Admin: 04/22/17 20:56 Dose:  Not Given


Levothyroxine Sodium (Synthroid)  150 mcg PO ACBREAKFAST [facility]


   Last Admin: 04/25/17 06:38 Dose:  150 mcg


Omeprazole (Omeprazole)  20 mg PO ACBREAKFAST [facility]


Pantoprazole Sodium (Protonix***)  40 mg PO ACBREAKFAST [facility]


   Last Admin: 04/24/17 06:28 Dose:  40 mg


Warfarin Sodium (Coumadin)  5 mg PO DAILY@1800 [facility]


   Last Admin: 04/25/17 16:59 Dose:  5 mg











- Exam


Quality Assessment: supplemental oxygen


General: alert, oriented, cooperative.  No: no acute distress


Neck: supple


Lungs: Crackles (mild fibrotic, chronic for her. )


Cardiovascular: Regular Rate, Regular Rhythm, Murmurs.  No: Tachycardia


Abdomen: bowel sounds present, soft, no tenderness, no distension


Back Exam: No: CVA tenderness (L), CVA tenderness (R)


Extremities: no edema


Neurological: no new focal deficit


Psy/Mental Status: alert, normal affect, normal mood





- Problem List Review


Problem List Initiated/Reviewed/Updated: Yes





- My Orders


Last 24 Hours: 


My Active Orders





04/26/17 08:52


Echo Comp wo Cont [US] Routine 





04/27/17 05:11


CBC WITH AUTO DIFF [HEME] Routine 














- Plan


Plan:: 





Brief HISTORY OF PRESENT ILLNESS


This 82-year-old female was initially admitted for chest pain and weakness and 

was determined for her to have a myocardial infarction quite significant. Due 

to her weakness status post MI she was placed in swing bed therapy.








HOSPITAL COURSE WHILE ACUTE CARE STATUS


Patient's hospital course went fairly well although she did have a significant 

myocardial infarction, however do to comorbidities it was determined best to 

treat the patient for ischemia demand driven protocol with maximal medical 

conservative treatment and the family did agree with this. She did have a low 

blood pressure early on with troponin level of about 20. Beta blockers were 

started and this did improve her blood pressure. She did get quite weak during 

her hospital stay and a physical therapy consult was placed and the patient 

could benefit from strengthening and ambulation. She remained on telemetry and 

we had no reperfusion arrhythmias or PVCs noted. We continued aspirin, and 

Coumadin. Physical therapy did assist with venous stasis ulcer debridement and 

wound debridement. She did have Klebsiella oxytocin staph coagulase negative 

from her venous stasis ulcer right lower extremity and she was treated with 

sensitive to ceftriaxone.  Statin therapy was started. She did have some mild 

weight gain with slight fluid overload about 7 pounds gained however diuresis 

him gently this has come down quite a bit.


________________________________________________________________


CODE STATUS full code





IMPRESSION/PLAN





Generalized weakness and need for strength building and rehabilitation 

secondary to below-profound weakness, also receiving wound therapy which she is 

responding well to. 





Myocardial infarction, post stabilization, on aspirin, anticoagulated with 

Coumadin due to history of PE. Started Plavix. NICHOLAS risk score 6/7. Started low-

dose beta blocker tolerating well--holding parameters. Off telemetry. 

Echocardiogram today. Will consider ACE inhibitor when BP improves. Continue 

with diuresing--gentle wt is now at baselind.   started statin therapy.





Infection venous stasis ulcers, right lower extremity, definitive therapy with 

ceftriaxone due to Klebsiella oxytoca--PT doing wound therapy--healing nicely. 

  











CHRONIC MEDICAL PROBLEMS,


History of cellulitis


History of bilateral pulmonary embolism, on chronic anticoagulation--no 

complication


Depression, seems stable anemia, chronic disease


Thoracic back pain, currently on pain management contract, opioid use.  No abuse


Opioid-induced constipation, placed on MovanHypertension, placed on low-dose 

beta blocker, will titrate upwards toward blood pressure response


Polymyalgia rheumatica 


History of giant cell arteritis

## 2017-04-27 PROCEDURE — 30233N1 TRANSFUSION OF NONAUTOLOGOUS RED BLOOD CELLS INTO PERIPHERAL VEIN, PERCUTANEOUS APPROACH: ICD-10-PCS | Performed by: NURSE PRACTITIONER

## 2017-04-27 RX ADMIN — Medication SCH MG: at 08:49

## 2017-04-27 RX ADMIN — Medication SCH TAB: at 08:47

## 2017-04-27 RX ADMIN — POTASSIUM CHLORIDE SCH MEQ: 750 TABLET, FILM COATED, EXTENDED RELEASE ORAL at 08:47

## 2017-04-27 RX ADMIN — HYDROCODONE BITARTRATE AND ACETAMINOPHEN PRN TAB: 10; 325 TABLET ORAL at 20:31

## 2017-04-27 RX ADMIN — VITAMIN D, TAB 1000IU (100/BT) SCH UNITS: 25 TAB at 08:49

## 2017-04-27 RX ADMIN — CYANOCOBALAMIN TAB 500 MCG SCH MCG: 500 TAB at 08:49

## 2017-04-27 RX ADMIN — Medication SCH TAB: at 18:15

## 2017-04-27 RX ADMIN — HYDROCODONE BITARTRATE AND ACETAMINOPHEN PRN TAB: 10; 325 TABLET ORAL at 13:52

## 2017-04-27 RX ADMIN — HYDROCODONE BITARTRATE AND ACETAMINOPHEN PRN TAB: 10; 325 TABLET ORAL at 08:50

## 2017-04-28 RX ADMIN — HYDROCODONE BITARTRATE AND ACETAMINOPHEN PRN TAB: 10; 325 TABLET ORAL at 06:06

## 2017-04-28 RX ADMIN — Medication SCH MG: at 09:00

## 2017-04-28 RX ADMIN — VITAMIN D, TAB 1000IU (100/BT) SCH UNITS: 25 TAB at 09:00

## 2017-04-28 RX ADMIN — HYDROCODONE BITARTRATE AND ACETAMINOPHEN PRN TAB: 10; 325 TABLET ORAL at 10:00

## 2017-04-28 RX ADMIN — CYANOCOBALAMIN TAB 500 MCG SCH MCG: 500 TAB at 09:00

## 2017-04-28 RX ADMIN — Medication SCH TAB: at 18:00

## 2017-04-28 RX ADMIN — HYDROCODONE BITARTRATE AND ACETAMINOPHEN PRN TAB: 10; 325 TABLET ORAL at 19:29

## 2017-04-28 RX ADMIN — Medication SCH TAB: at 08:57

## 2017-04-29 RX ADMIN — Medication SCH TAB: at 18:11

## 2017-04-29 RX ADMIN — Medication SCH MG: at 08:35

## 2017-04-29 RX ADMIN — HYDROCODONE BITARTRATE AND ACETAMINOPHEN PRN TAB: 10; 325 TABLET ORAL at 07:45

## 2017-04-29 RX ADMIN — POTASSIUM CHLORIDE SCH MEQ: 750 TABLET, FILM COATED, EXTENDED RELEASE ORAL at 08:36

## 2017-04-29 RX ADMIN — Medication SCH TAB: at 08:37

## 2017-04-29 RX ADMIN — VITAMIN D, TAB 1000IU (100/BT) SCH UNITS: 25 TAB at 08:35

## 2017-04-29 RX ADMIN — HYDROCODONE BITARTRATE AND ACETAMINOPHEN PRN TAB: 10; 325 TABLET ORAL at 21:08

## 2017-04-30 LAB
CHLORIDE SERPL-SCNC: 100 MMOL/L (ref 98–115)
SODIUM SERPL-SCNC: 135 MMOL/L (ref 136–145)

## 2017-04-30 RX ADMIN — HYDROCODONE BITARTRATE AND ACETAMINOPHEN PRN TAB: 10; 325 TABLET ORAL at 13:59

## 2017-04-30 RX ADMIN — HYDROCODONE BITARTRATE AND ACETAMINOPHEN PRN TAB: 10; 325 TABLET ORAL at 20:38

## 2017-04-30 RX ADMIN — HYDROCODONE BITARTRATE AND ACETAMINOPHEN PRN TAB: 10; 325 TABLET ORAL at 09:30

## 2017-04-30 RX ADMIN — HYDROCODONE BITARTRATE AND ACETAMINOPHEN PRN TAB: 10; 325 TABLET ORAL at 03:40

## 2017-04-30 RX ADMIN — Medication SCH MG: at 08:58

## 2017-04-30 RX ADMIN — Medication SCH TAB: at 17:15

## 2017-04-30 RX ADMIN — VITAMIN D, TAB 1000IU (100/BT) SCH UNITS: 25 TAB at 08:58

## 2017-04-30 RX ADMIN — Medication SCH TAB: at 08:58

## 2017-04-30 NOTE — PN
04/30/2017 PATIENT NAME:  RAJANI ARAIZA

 

SUBJECTIVE:  This is an 82-year-old female patient with a history of MDS, that

had presented to the clinic with midsternal chest pain, and shortness

of breath.  She was admitted to the hospital for further evaluation and

treatment.  At that time, she was found to have an elevated troponin and non-

STEMI MI.  The patient was treated medically, she was not a candidate for

cardiac stenting.  The patient was placed in swing bed status.  Today, she

states that she is feeling fine, she has no chest pain, no shortness of breath.

She says she feels really weak.  She says the ulcer to her right leg is very

painful when she touches it.  Otherwise, she is doing pretty well.

 

OBJECTIVE:  VITAL SIGNS:  Today, temperature is 97.0, pulse is 54, blood

pressure is 112/61, respiratory rate is 16, oxygen saturations are 94% on 2 L

nasal cannula.

GENERAL:  This is an elderly white female, in no acute distress.

LUNGS:  Sounds are diminished throughout.  Clear in the upper lobes.

HEART:  Heart tones are regular rate and rhythm.

ABDOMEN:  Soft, nontender, and nondistended.  Bowel sounds present x4.

EXTREMITIES:  Ulcer to right leg, shows no erythema, minimal drainage, appears

to be healing well.

 

LABORATORY DATA:  The patient's lab work today, CBC shows a white count at 3.5,

hemoglobin 10.0, platelet count at 209.  The patient's chemistry panel; sodium

135, BUN 29, otherwise unremarkable.

 

IMPRESSION AND PLAN:

1. Recent myocardial infarction.  Plan, discussed with [facility].

    We will continue the patient on Plavix 75 mg daily along with aspirin

    81 mg daily, beta blocker, metoprolol tartrate 12.5 mg twice daily.  We

    will continue with Lasix 40 mg every other day.  Also, continue with

    Coumadin orally daily.  The patient does have a history of a pulmonary

    embolism.  The patient did have an echo obtained on 04/26/2017.  Conclusion

    states the patient does have mild left ventricle hypertrophy.  Ejection

    fraction was 58%.

2. Right leg ulcer.  Plan: Continue with dressing change daily, per

    nursing staff, no infection noted, the patient has finished course of

    antibiotics.  Healing slowly. Wound debridement per PT.

3. Chronic pain.  Plan to continue with morphine 1 mg IV as needed.

4. History of hypothyroidism.  Plan to continue with levothyroxine 125 mcg

    daily.

5. Insomnia.  Plan to continue with Remeron 30 mg at bedtime, along with

    melatonin as needed.

6. Gastrointestinal motility and stress ulcer prophylaxis.  Plan to continue

    with Movantik 25 mg daily for GI motility, along with Protonix 40 mg daily.

7. History of myelodysplastic syndrome.  Plan to continue with prednisone 5 mg

    daily.

 

DD:  04/30/2017 13:23:51

DT:  04/30/2017 15:04:13

Job #:  [id number]

MTDD

## 2017-05-01 RX ADMIN — Medication SCH MG: at 08:42

## 2017-05-01 RX ADMIN — VITAMIN D, TAB 1000IU (100/BT) SCH UNITS: 25 TAB at 08:42

## 2017-05-01 RX ADMIN — POTASSIUM CHLORIDE SCH MEQ: 750 TABLET, FILM COATED, EXTENDED RELEASE ORAL at 08:42

## 2017-05-01 RX ADMIN — HYDROCODONE BITARTRATE AND ACETAMINOPHEN PRN TAB: 10; 325 TABLET ORAL at 12:17

## 2017-05-01 RX ADMIN — HYDROCODONE BITARTRATE AND ACETAMINOPHEN PRN TAB: 10; 325 TABLET ORAL at 20:57

## 2017-05-01 RX ADMIN — CYANOCOBALAMIN TAB 500 MCG SCH MCG: 500 TAB at 08:42

## 2017-05-01 RX ADMIN — Medication SCH TAB: at 08:42

## 2017-05-01 RX ADMIN — Medication SCH TAB: at 18:03

## 2017-05-01 RX ADMIN — HYDROCODONE BITARTRATE AND ACETAMINOPHEN PRN TAB: 10; 325 TABLET ORAL at 16:49

## 2017-05-02 RX ADMIN — HYDROCODONE BITARTRATE AND ACETAMINOPHEN PRN TAB: 10; 325 TABLET ORAL at 15:51

## 2017-05-02 RX ADMIN — Medication SCH TAB: at 08:20

## 2017-05-02 RX ADMIN — HYDROCODONE BITARTRATE AND ACETAMINOPHEN PRN TAB: 10; 325 TABLET ORAL at 20:58

## 2017-05-02 RX ADMIN — VITAMIN D, TAB 1000IU (100/BT) SCH UNITS: 25 TAB at 08:20

## 2017-05-02 RX ADMIN — Medication SCH TAB: at 17:08

## 2017-05-02 RX ADMIN — Medication SCH MG: at 08:20

## 2017-05-02 RX ADMIN — HYDROCODONE BITARTRATE AND ACETAMINOPHEN PRN TAB: 10; 325 TABLET ORAL at 05:11

## 2017-05-02 RX ADMIN — CYANOCOBALAMIN TAB 500 MCG SCH MCG: 500 TAB at 08:20

## 2017-05-03 RX ADMIN — POTASSIUM CHLORIDE SCH MEQ: 750 TABLET, FILM COATED, EXTENDED RELEASE ORAL at 08:35

## 2017-05-03 RX ADMIN — VITAMIN D, TAB 1000IU (100/BT) SCH UNITS: 25 TAB at 08:38

## 2017-05-03 RX ADMIN — CYANOCOBALAMIN TAB 500 MCG SCH MCG: 500 TAB at 08:38

## 2017-05-03 RX ADMIN — Medication SCH MG: at 08:38

## 2017-05-03 RX ADMIN — Medication SCH TAB: at 08:35

## 2017-05-03 RX ADMIN — HYDROCODONE BITARTRATE AND ACETAMINOPHEN PRN TAB: 10; 325 TABLET ORAL at 21:04

## 2017-05-03 RX ADMIN — HYDROCODONE BITARTRATE AND ACETAMINOPHEN PRN TAB: 10; 325 TABLET ORAL at 02:24

## 2017-05-03 RX ADMIN — HYDROCODONE BITARTRATE AND ACETAMINOPHEN PRN TAB: 10; 325 TABLET ORAL at 12:08

## 2017-05-03 RX ADMIN — Medication SCH TAB: at 17:46

## 2017-05-04 RX ADMIN — HYDROCODONE BITARTRATE AND ACETAMINOPHEN PRN TAB: 10; 325 TABLET ORAL at 11:42

## 2017-05-04 RX ADMIN — Medication SCH TAB: at 09:31

## 2017-05-04 RX ADMIN — VITAMIN D, TAB 1000IU (100/BT) SCH UNITS: 25 TAB at 09:33

## 2017-05-04 RX ADMIN — HYDROCODONE BITARTRATE AND ACETAMINOPHEN PRN TAB: 10; 325 TABLET ORAL at 04:30

## 2017-05-04 RX ADMIN — Medication SCH MG: at 09:33

## 2017-05-04 RX ADMIN — Medication SCH TAB: at 17:19

## 2017-05-04 RX ADMIN — CYANOCOBALAMIN TAB 500 MCG SCH MCG: 500 TAB at 09:33

## 2017-05-04 RX ADMIN — HYDROCODONE BITARTRATE AND ACETAMINOPHEN PRN TAB: 10; 325 TABLET ORAL at 21:49

## 2017-05-05 VITALS — DIASTOLIC BLOOD PRESSURE: 65 MMHG | SYSTOLIC BLOOD PRESSURE: 111 MMHG

## 2017-05-05 RX ADMIN — Medication SCH MG: at 08:14

## 2017-05-05 RX ADMIN — HYDROCODONE BITARTRATE AND ACETAMINOPHEN PRN TAB: 10; 325 TABLET ORAL at 11:53

## 2017-05-05 RX ADMIN — Medication SCH TAB: at 08:14

## 2017-05-05 RX ADMIN — HYDROCODONE BITARTRATE AND ACETAMINOPHEN PRN TAB: 10; 325 TABLET ORAL at 04:37

## 2017-05-05 RX ADMIN — VITAMIN D, TAB 1000IU (100/BT) SCH UNITS: 25 TAB at 08:15

## 2017-05-05 RX ADMIN — CYANOCOBALAMIN TAB 500 MCG SCH MCG: 500 TAB at 08:15

## 2017-05-05 RX ADMIN — POTASSIUM CHLORIDE SCH MEQ: 750 TABLET, FILM COATED, EXTENDED RELEASE ORAL at 08:14

## 2017-05-05 NOTE — PCM.DCSUM1
**Discharge Summary





- Hospital Course


Brief History: This 82-year-old female was initially admitted into acute status 

do to a myocardial infarction.  However after his stabilization the patient 

became quite weak requiring physical therapy and ongoing wound debridement with 

physical therapy.





- Discharge Data


Discharge Date: 05/05/17


Discharge Disposition: Home, Self-Care 01


Condition: Good





- Patient Summary/Data


Complications: Patient had no complications while in swing bed however she did 

suffer a myocardial infarction while in acute care.  hospital course while in 

acute care; went fairly well although she did have a significant myocardial 

infarction, however do to comorbidities it was determined best to treat the 

patient for ischemia demand driven protocol with maximal medical conservative 

treatment and the family did agree with this.  She did have a low blood 

pressure early on with troponin level of about 20. Beta blockers were started 

and this did improve her blood pressure. She did get quite weak during her 

hospital stay and a physical therapy consult was placed and the patient could 

benefit from strengthening and ambulation.  She remained on telemetry and we 

had no reperfusion arrhythmias or PVCs noted. We continued aspirin, and 

Coumadin. Physical therapy did assist with venous stasis ulcer debridement and 

wound debridement. She did have Klebsiella oxytocin staph coagulase negative 

from her venous stasis ulcer right lower extremity and she was treated with 

sensitive to ceftriaxone.  Statin therapy was started. She did have some mild 

weight gain with slight fluid overload about 7 pounds gained however she did 

diureses well.  We did place her on Plavix, aspirin and beta blocker. Her 

thyroid was normal. She did well through physical therapy treatments.  Her 

wounds improved greatly with e-stim to the wounds with a negative continuous HV 

current during debridement for pain reduction.  Physical therapy notes 

indicated that she cleaned wound with sterile water. Completed sharps 

debridement to both right LE wounds with scalpel and forceps, removing <0.75 sq 

cm slough from medial wound and 1 sq cm of slough from larger wound. Large 

wound measures 3.0 cm x 2.4 cm x 0.3 cm with increased epithelial tissue. 

Smaller wound is 50% epithelial/10% slough/40% granulating with measurements of 

1.0 cm x 0.6 cm x0.1 cm. Used lidocaine gel to reduce pain associated with 

debridement. Recleansed wound with sterile water and placed Aquacel Ag on 

woundbed, covered with Kerlex roll and completed ACE bandage to LE for 

compression.


Consults: 


 Consultations





04/21/17 12:58


Consult to Physical Therapy [PT Evaluation and Treatment] [CONS] Routine 











Hospital Course: 








hospital course while in acute care; went fairly well although she did have a 

significant myocardial infarction, however do to comorbidities it was 

determined best to treat the patient for ischemia demand driven protocol with 

maximal medical conservative treatment and the family did agree with this. 





She did have a low blood pressure early on with troponin level of about 20. 

Beta blockers were started and this did improve her blood pressure. She did get 

quite weak during her hospital stay and a physical therapy consult was placed 

and the patient could benefit from strengthening and ambulation. 





She remained on telemetry and we had no reperfusion arrhythmias or PVCs noted. 

We continued aspirin, and Coumadin. Physical therapy did assist with venous 

stasis ulcer debridement and wound debridement. She did have Klebsiella 

oxytocin staph coagulase negative from her venous stasis ulcer right lower 

extremity and she was treated with sensitive to ceftriaxone.  Statin therapy 

was started. She did have some mild weight gain with slight fluid overload 

about 7 pounds gained however she did diureses well. 





We did place her on Plavix, aspirin and beta blocker. Her thyroid was normal. 

She did well through physical therapy treatments.  Her wounds improved greatly 

with e-stim to the wounds with a negative continuous HV current during 

debridement for pain reduction.





Eventually she was placed in swing bed status and rehabilitated very well with 

physical therapy. 





Physical therapy notes indicated that she cleaned wound with sterile water. 

Completed sharps debridement to both right LE wounds with scalpel and forceps, 

removing <0.75 sq cm slough from medial wound and 1 sq cm of slough from larger 

wound. Large wound measures 3.0 cm x 2.4 cm x 0.3 cm with increased epithelial 

tissue. Smaller wound is 50% epithelial/10% slough/40% granulating with 

measurements of 1.0 cm x 0.6 cm x0.1 cm. Used lidocaine gel to reduce pain 

associated with debridement. Recleansed wound with sterile water and placed 

Aquacel Ag on woundbed, covered with Kerlex roll and completed ACE bandage to 

LE for compression.








- Patient Instructions


Diet: Usual Diet as Tolerated


Activity: As Tolerated


Driving: Do Not Drive


Showering/Bathing: May Shower


Notify Provider of: Fever, Swelling and Redness, Drainage, Nausea and/or 

Vomiting


Other/Special Instructions: Report any chest pain, dizziness or shortness of 

breath





- Discharge Plan


Prescriptions/Med Rec: 


Metoprolol Tartrate [Lopressor] 12.5 mg PO Q12HR #60 tablet


Aspirin [Halfprin] 81 mg PO DAILY #90 tab.ec


Clopidogrel [Plavix] 75 mg PO DAILY #90 tablet


Home Medications: 


 Home Meds





Cholecalciferol (Vitamin D3) [Vitamin D3] 2,000 unit PO DAILY 12/27/13 [History]


Cyanocobalamin (Vitamin B-12) [B-12 Dots] 500 mcg PO MOTUWETHFR@0900 12/27/13 [

History]


Hydrocodone/Acetaminophen [Hydrocodon-Acetaminophn ] 1 tab PO Q6H PRN 12/ 27/13 [History]


Omeprazole 20 mg PO ACBREAKFAST 12/27/13 [History]


Warfarin [Coumadin] 5 mg PO DAILY@1800 09/02/15 [History]


Polyethylene Glycol 3350 [MiraLAX] 1 packet PO DAILY PRN 09/30/15 [History]


Calcium Carbonate/Vitamin D3 [Calcium 600-Vit D3 400 Tablet] 1 tab PO BIDMEALS 

12/01/15 [History]


Mirtazapine [Remeron] 30 mg PO BEDTIME 04/29/16 [History]


Phytonadione [Vitamin K] 100 mcg PO DAILY 04/29/16 [History]


Pyridoxine HCl [Vitamin B-6] 100 mg PO DAILY 04/29/16 [History]


predniSONE [Prednisone] 5 mg PO DAILY@1800 08/03/16 [History]


Levothyroxine Sodium [Synthroid] 150 mcg PO ACBREAKFAST 01/20/17 [History]


Methylphenidate HCl [Ritalin] 5 mg PO ACBREAKFAST 01/20/17 [History]


Potassium Chloride [Klor-Con M20] 10 meq PO Q2D 03/25/17 [History]


Furosemide [Lasix] 40 mg PO Q2D 04/17/17 [History]


Aspirin [Halfprin] 81 mg PO DAILY #90 tab.ec 05/05/17 [Rx]


Clopidogrel [Plavix] 75 mg PO DAILY #90 tablet 05/05/17 [Rx]


Metoprolol Tartrate [Lopressor] 12.5 mg PO BID #0 tablet 05/05/17 [Rx]


Metoprolol Tartrate [Lopressor] 12.5 mg PO Q12HR #60 tablet 05/05/17 [Rx]








Referrals: 


[facility] [Outside]


Stuart Jordan NP [Nurse Practitioner] - 05/12/17





- Discharge Summary/Plan Comment


DC Time >30 min.: Yes


Discharge Summary/Plan Comment: 


FINAL DIAGNOSIS





Weakness, much improved


Myocardial infarction, metoprolol, Plavix, aspirin,


Infection venous stasis ulcers 


Hypothyroidism








CHRONIC MEDICAL PROBLEMS,


Hyperlipidemia, started on statin therapy


History of cellulitis


History of bilateral pulmonary embolism, on chronic anticoagulation--no 

complication


Depression, seems stable anemia, chronic disease


Thoracic back pain, currently on pain management contract, opioid use.  No abuse


Opioid-induced constipation, placed on MovanHypertension, placed on low-dose 

beta blocker, will titrate upwards toward blood pressure response


Polymyalgia rheumatica 


History of giant cell arteritis








- Patient Data


Vitals - Most Recent: 


 Last Vital Signs











Temp  97.3 F   05/05/17 07:00


 


Pulse  69   05/05/17 08:16


 


Resp  16   05/05/17 07:00


 


BP  111/65   05/05/17 08:16


 


Pulse Ox  93 L  05/05/17 07:39











Weight - Most Recent: 116 lb 12.8 oz


I&O - Last 24 hours: 


 Intake & Output











 05/04/17 05/05/17 05/05/17





 22:59 06:59 14:59


 


Intake Total 280 200 


 


Balance 280 200 











Med Orders - Current: 


 Current Medications





Hydrocodone Bitart/Acetaminophen (Norco 325-10 Mg)  1 tab PO Q4H PRN


   PRN Reason: Pain


   Last Admin: 05/05/17 04:37 Dose:  1 tab


Aspirin (Halfprin)  81 mg PO DAILY [facility]


   Last Admin: 05/05/17 08:16 Dose:  81 mg


Calcium Citrate (Calcium Citrate + D)  1 tab PO BIDMEALS [facility]


   Last Admin: 05/05/17 08:14 Dose:  1 tab


Cholecalciferol (Vitamin D3)  2,000 units PO DAILY [facility]


   Last Admin: 05/05/17 08:15 Dose:  2,000 units


Clopidogrel Bisulfate (Plavix)  75 mg PO DAILY [facility]


   Last Admin: 05/05/17 08:14 Dose:  75 mg


Cyanocobalamin (Vitamin B12)  500 mcg PO MOTUWETHFR@0900 [facility]


   Last Admin: 05/05/17 08:15 Dose:  500 mcg


Furosemide (Lasix)  40 mg PO Q2D [facility]


   Last Admin: 05/05/17 08:15 Dose:  40 mg


Lactobacillus Acidophilus/Rhamnosus (Multi-Cynthia Plus)  1 cap PO DAILY [facility]


   Last Admin: 05/05/17 08:16 Dose:  1 cap


Levothyroxine Sodium (Synthroid)  125 mcg PO ACBREAKFAST [facility]


   Last Admin: 05/05/17 08:13 Dose:  Not Given


Loperamide HCl (Imodium)  2 mg PO ASDIRECTED PRN


   PRN Reason: Diarrhea


   Last Admin: 05/05/17 04:45 Dose:  4 mg


Melatonin (Melatonin)  3 mg PO BEDTIME PRN


   PRN Reason: Insomnia


   Last Admin: 05/02/17 20:58 Dose:  3 mg


Metoprolol Tartrate (Lopressor)  12.5 mg PO BID [facility]


   Last Admin: 05/05/17 08:16 Dose:  12.5 mg


Mirtazapine (Remeron)  30 mg PO BEDTIME [facility]


   Last Admin: 05/04/17 21:49 Dose:  30 mg


Morphine Sulfate (Morphine)  1 mg IVPUSH Q2H PRN


   PRN Reason: Chest Pain


Ondansetron HCl (Zofran)  4 mg IVPUSH Q4H PRN


   PRN Reason: Nausea/Vomiting


Pantoprazole Sodium (Protonix***)  40 mg PO 0900 [facility]


   Last Admin: 05/05/17 08:15 Dose:  40 mg


Phytonadione (Vitamin K)  100 mcg PO DAILY [facility]


   Last Admin: 05/05/17 08:15 Dose:  100 mcg


Polyethylene Glycol (Miralax)  17 gm PO DAILY PRN


   PRN Reason: Constipation


Potassium Chloride (Klor-Con 10)  10 meq PO Q2D [facility]


   Last Admin: 05/05/17 08:14 Dose:  10 meq


Prednisone (Prednisone)  5 mg PO DAILY@1800 [facility]


   Last Admin: 05/04/17 17:19 Dose:  5 mg


Pyridoxine HCl (Vitamin B6-Pyridoxine)  100 mg PO DAILY [facility]


   Last Admin: 05/05/17 08:14 Dose:  100 mg


Sodium Chloride (Syrex Flush)  5 ml FLUSH Q8HR PRN


   PRN Reason: Keep Vein Open


   Last Admin: 04/25/17 20:17 Dose:  5 ml


Warfarin Sodium (Coumadin)  4 mg PO DAILY@1800 [facility]


   Last Admin: 05/04/17 17:19 Dose:  4 mg





Discontinued Medications





Calcium Citrate (Calcium Citrate + D)  2 tab PO BIDMEALS [facility]


   Last Admin: 04/24/17 18:07 Dose:  1 tab


Ceftriaxone Sodium (Rocephin)  1 gm IVPUSH Q24H [facility]


   Stop: 04/29/17 20:30


   Last Admin: 04/27/17 20:31 Dose:  1 gm


Sodium Chloride (Normal Saline)  100 mls @ 20 mls/hr IV ONETIME@1400 [facility]


   Stop: 04/22/17 18:59


   Last Admin: 04/22/17 20:56 Dose:  Not Given


Levothyroxine Sodium (Synthroid)  150 mcg PO ACBREAKFAST [facility]


   Last Admin: 04/25/17 06:38 Dose:  150 mcg


Omeprazole (Omeprazole)  20 mg PO ACBREAKFAST [facility]


Pantoprazole Sodium (Protonix***)  40 mg PO ACBREAKFAST [facility]


   Last Admin: 04/24/17 06:28 Dose:  40 mg


Warfarin Sodium (Coumadin)  5 mg PO DAILY@1800 [facility]


   Last Admin: 04/25/17 16:59 Dose:  5 mg


Warfarin Sodium (Coumadin)  5 mg PO DAILY@1800 [facility]


   Last Admin: 04/26/17 21:37 Dose:  5 mg


Warfarin Sodium (Coumadin)  4 mg PO DAILY@1800 [facility]











- Exam


Quality Assessment: Reports: supplemental oxygen


General: Reports: alert, oriented


Neck: Reports: supple


Lungs: Reports: Clear to auscultation, Normal respiratory effort


Cardiovascular: Reports: Regular Rate, Regular Rhythm


Wound/Incisions: Reports: healing well, dressing dry and intact


Neurological: Reports: no new focal deficit


Psy/Mental Status: Reports: alert, normal affect, normal mood





*Q Meaningful Use (DIS)





- VTE *Q


VTE Criteria *Q: 








- Stroke *Q


Stroke Criteria *Q: 








- AMI *Q


AMI Criteria *Q:

## 2017-05-09 ENCOUNTER — HOSPITAL ENCOUNTER (INPATIENT)
Dept: HOSPITAL 77 - KA.MS | Age: 82
LOS: 3 days | Discharge: NURSING, REHAB OR LONG-TERM CARE | DRG: 812 | End: 2017-05-12
Attending: NURSE PRACTITIONER | Admitting: NURSE PRACTITIONER
Payer: MEDICARE

## 2017-05-09 DIAGNOSIS — I11.0: ICD-10-CM

## 2017-05-09 DIAGNOSIS — Z86.718: ICD-10-CM

## 2017-05-09 DIAGNOSIS — R19.7: ICD-10-CM

## 2017-05-09 DIAGNOSIS — Z79.01: ICD-10-CM

## 2017-05-09 DIAGNOSIS — E78.00: ICD-10-CM

## 2017-05-09 DIAGNOSIS — Z88.8: ICD-10-CM

## 2017-05-09 DIAGNOSIS — D46.4: Primary | ICD-10-CM

## 2017-05-09 DIAGNOSIS — Z88.1: ICD-10-CM

## 2017-05-09 DIAGNOSIS — E83.52: ICD-10-CM

## 2017-05-09 DIAGNOSIS — M31.5: ICD-10-CM

## 2017-05-09 DIAGNOSIS — M79.7: ICD-10-CM

## 2017-05-09 DIAGNOSIS — Z79.899: ICD-10-CM

## 2017-05-09 DIAGNOSIS — I50.30: ICD-10-CM

## 2017-05-09 DIAGNOSIS — I83.019: ICD-10-CM

## 2017-05-09 DIAGNOSIS — M81.0: ICD-10-CM

## 2017-05-09 DIAGNOSIS — E86.0: ICD-10-CM

## 2017-05-09 DIAGNOSIS — Z88.0: ICD-10-CM

## 2017-05-09 DIAGNOSIS — I25.2: ICD-10-CM

## 2017-05-09 DIAGNOSIS — R53.1: ICD-10-CM

## 2017-05-09 DIAGNOSIS — E78.5: ICD-10-CM

## 2017-05-09 DIAGNOSIS — Z79.82: ICD-10-CM

## 2017-05-09 DIAGNOSIS — E03.9: ICD-10-CM

## 2017-05-09 DIAGNOSIS — R53.82: ICD-10-CM

## 2017-05-09 DIAGNOSIS — Z86.711: ICD-10-CM

## 2017-05-09 LAB
CHLORIDE SERPL-SCNC: 102 MMOL/L (ref 98–115)
SODIUM SERPL-SCNC: 139 MMOL/L (ref 136–145)

## 2017-05-09 PROCEDURE — P9016 RBC LEUKOCYTES REDUCED: HCPCS

## 2017-05-10 PROCEDURE — 30233N1 TRANSFUSION OF NONAUTOLOGOUS RED BLOOD CELLS INTO PERIPHERAL VEIN, PERCUTANEOUS APPROACH: ICD-10-PCS | Performed by: NURSE PRACTITIONER

## 2017-05-10 RX ADMIN — HYDROCODONE BITARTRATE AND ACETAMINOPHEN PRN TAB: 10; 325 TABLET ORAL at 07:04

## 2017-05-10 RX ADMIN — VITAMIN D, TAB 1000IU (100/BT) SCH UNITS: 25 TAB at 08:38

## 2017-05-10 RX ADMIN — CYANOCOBALAMIN TAB 500 MCG SCH MCG: 500 TAB at 08:38

## 2017-05-10 RX ADMIN — OMEPRAZOLE SCH MG: 20 CAPSULE, DELAYED RELEASE ORAL at 07:03

## 2017-05-10 RX ADMIN — HYDROCODONE BITARTRATE AND ACETAMINOPHEN PRN TAB: 10; 325 TABLET ORAL at 13:37

## 2017-05-10 RX ADMIN — HYDROCODONE BITARTRATE AND ACETAMINOPHEN PRN TAB: 10; 325 TABLET ORAL at 01:20

## 2017-05-10 NOTE — PN
05/10/2017 PATIENT NAME:  RAJANI ARAIZA

 

CHIEF COMPLAINT:  Does feel better, still somewhat weak, pallor skin, her

hemoglobin dropped to 7.4 today.

 

HISTORY:  I had seen this 82-year-old frail patient yesterday in the [facility].  She was brought in by her daughter.  She had just recently been

discharged from the hospital after suffering a myocardial infarction with a

lengthy swing bed placement and stay due to weakness, receiving physical therapy

also for wound debridement and E-stim to her wound.  She was discharged.  Few

days later, she stated she started to get weak, actually started complaining of

some diarrhea.  The patient does have significant chronic medical issues such as

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  We

have been holding her Procrit due to significant myocardial infarction.  She

sustained probably most likely will not start up on this for another couple

weeks.  She is being followed by Oncology, Dr. Henry.  So, she was basically

admitted for weakness, IV fluids, and more surveillance of her diarrhea and

ultimately hope to be placed in a long-term care facility.

 

LABS:  Hemoglobin 7.4, this is down; white count 3.2; RBC is 2.33.  Sodium 139,

potassium normal 4.1, BUN 54, with a creatinine of 1.25, BUN creatinine ratio

35:1 likely prerenal as she does has some diarrhea.  Calcium high 11.4.

Troponin slightly elevated at 0.10.

 

PHYSICAL EXAMINATION:  VITAL SIGNS:  Blood pressure 114/63, heart rate 60,

temperature 97.5, she is on 2 L nasal cannula, O2 sats 96%, respiratory rate 18.

GENERAL:  The patient alert and oriented.  She does look improved today.  She is

lucid.

LUNGS:  Fibrotic crackles left base.

CV:  Regular rate and rhythm.  3/6 pansystolic murmur.  Good bowel tones.  Good

radial pulses.  No edema.  She does have a right leg ulcer.  I examined this

yesterday to her right medial aspect of her ankle.  Dressing clean, dry, and

intact.

 

IMPRESSION AND PLAN:

1. Anemia refractory due to myelodysplastic syndrome.  We will transfuse 2

    units of packed red blood cells today very slowly.  Lasix in between each

    unit due to her myelodysplastic syndrome and she is off her Procrit due to

    her history of myocardial infarction.  She will need to have maintain

    hemoglobin above 9.

2. Dehydration, likely prerenal, contributing to her elevated calcium level.

    She will get some fluid today, gentle fluids, monitor for any fluid

    overload status.

3. Dehydration.  BUN creatinine ratio 35:1, see plan below.

4. Hypercalcemia 11.4, mild likely due to dehydration.  However, she does have

    myelodysplastic syndrome and some metabolic disorders that predisposes her

    probably most likely due to dehydration.  We will assess PTH level

    asymptomatic right now, although she is quite fatigued.  We will place on

    tele today.

5. Hypothyroidism on thyroid replacement therapy.

6. Infection venous stasis ulcers, right lower extremity.  She is receiving E-

    stim therapy through physical therapy.

7. Recent myocardial infarction.  She is post stabilization.  She is on

    aspirin.  She is also on Coumadin due to history of PA.  We did start

    Plavix on last admission.  Her troponin is elevated today; however, this is

    likely sequela of previous myocardial infarction.  She did have a recent

    echocardiogram previous admission, which shows a mildly reduced ejection

    fraction with some mild diastolic dysfunction.  However, not too bad.  Does

    not equate with a significant troponin level she had at previous admission.

    Other chronic medical problems include history of cellulitis; history of

    pulmonary embolism, which she is on chronic anticoagulation; depression,

    which seems to be stable; thoracic back pain with history of opioid-induced

    constipation; polymyalgia rheumatica; history of giant-cell arteritis.

 

OVERALL PLAN:  Today, gentle fluids, watch for fluid overload.  Add a

parathyroid hormone to this morning labs.  Monitor for electrolyte disturbance,

placed on telemetry today, Lasix in between, the units of blood.  We will push

oral fluids.  Likely she could be placed in long-term care tomorrow.  She has

agreed to this.  Code status has been addressed with the patient.  She is

currently a full code.

 

DD:  05/10/2017 09:56:56

DT:  05/10/2017 11:13:29

Job #:  [id number]

## 2017-05-11 LAB
CHLORIDE SERPL-SCNC: 108 MMOL/L (ref 98–115)
SODIUM SERPL-SCNC: 143 MMOL/L (ref 136–145)

## 2017-05-11 RX ADMIN — Medication SCH MG: at 08:38

## 2017-05-11 RX ADMIN — VITAMIN D, TAB 1000IU (100/BT) SCH UNITS: 25 TAB at 08:38

## 2017-05-11 RX ADMIN — OMEPRAZOLE SCH MG: 20 CAPSULE, DELAYED RELEASE ORAL at 06:02

## 2017-05-11 RX ADMIN — CYANOCOBALAMIN TAB 500 MCG SCH MCG: 500 TAB at 08:38

## 2017-05-11 RX ADMIN — HYDROCODONE BITARTRATE AND ACETAMINOPHEN PRN TAB: 10; 325 TABLET ORAL at 13:35

## 2017-05-11 RX ADMIN — HYDROCODONE BITARTRATE AND ACETAMINOPHEN PRN TAB: 10; 325 TABLET ORAL at 20:00

## 2017-05-11 NOTE — PN
05/11/2017 PATIENT NAME:  RAJANI ARAIZA

 

CHIEF COMPLAINT:  Diarrhea has stopped.

 

Overall the patient is doing better.  Hemoglobin did come up more than expected

after the first unit of blood transfusion, it is 9.7, we will hold off on 2nd

transfusion.  Otherwise, she does feel better.  Less pallor to her skin.  She

has more color.

 

HISTORY:  This 82-year-old female, who is very frail, I had admitted her.  She

was brought in by her daughter for just severe weakness and diarrhea.

 

She had suffered a myocardial infarction on the previous admission with a

lengthy stay in swing bed placement, also getting wound care therapy with E-stim

by Physical therapy on her right lower medial leg that appears to be doing well.

She has significant chronic medical issues that have placed her in an

immunocompromised state with frequent blood transfusions.  We have been holding

her Procrit due to recent MI, somewhat relative contraindication soon hopefully

we can start that back up in a couple of weeks, so she has required some blood

transfusions.

 

LABS:  Today again hemoglobin is improved from 7.4 on admission to 9.6, status

post 1 unit of packed red blood cells.  Sodium 143, potassium 4.5, BUN is

improved to 29, and now creatinine is normal.  This was elevated yesterday

likely due to prerenal indices.  Calcium is now improved to 10.1, it was 11.4

yesterday although I did perform a parathyroid hormone.  Most likely this was a

dehydration state. Troponin is 0.10, likely trending down due to previous MI and

renal insufficiency, prerenal.

 

PHYSICAL EXAMINATION:  VITAL SIGNS:  Temperature is normal.  Blood pressure

140/78; heart rate, O2 sats are normal, she is on 2 L.  LUNGS:  Were fibrotic

crackles.  This is chronic for her.

CV:  3/6 holosystolic murmur.  Regular rate and rhythm though.

:  Good bowel tones.  No distal edema.  Though she does have a right leg

ulcer, this is a clean, dry and dressing is intact, no longer infected.  She is

getting periodic E-stim treatment by physical therapy for this.  She will

continue with this when she gets placed in long-term care facility.

 

IMPRESSION AND PLAN:

1. Anemia refractory due to myelodysplastic syndrome.  We are holding off

    Procrit for another couple of weeks.  She did receive 1 unit of transfusion

    yesterday. Hemoglobin now 9.7.  I did hold Lasix yesterday due to a

    prerenal state.

2. Dehydration likely prerenal.  This is much improved.  Calcium is now

    normalized.  She is more euvolemic.  BUN creatinine ratio is almost

    normalized, much improved, yesterday it was 35:1.

3. Hypercalcemia.  This is now normal likely due to dehydration; however, I

    did assess for a parathyroid level hormone that is pending.

4. Hypothyroidism.  She is on thyroid replacement therapy.

5. Infectious with venous stasis ulcers.  No antibiotics are needed.  This is

    more of a debridement and E-stim, healing well.  She will continue to

    receive physical therapy treatment at long-term care facility.

6. Recent myocardial infarction.  This is post stabilization.  We have her on

    Coumadin due to history of pulmonary embolism.  Her troponin is still

    slightly elevated, but however, we feel this is just trending down, likely

    sequela from previous MI.

 

OVERALL PLAN:  She is doing well today.  I could actually discharge her today,

however, most likely we will discharge her tomorrow to long-term care facility.

I do not anticipate more than a month needed. She is in agreement with this

plan.  Currently I have addressed her code status.  She still wants to be a full

code.  She will need extensive physical therapy and occupational therapy at [facility] due to extreme weakness which has improved; however, she still

has gait instability and the need for E-stim wound care to her right lower

extremity.

 

DD:  05/11/2017 11:20:41

DT:  05/11/2017 11:45:06

Job #:  [id number]

## 2017-05-12 VITALS — SYSTOLIC BLOOD PRESSURE: 137 MMHG | DIASTOLIC BLOOD PRESSURE: 73 MMHG

## 2017-05-12 RX ADMIN — OMEPRAZOLE SCH MG: 20 CAPSULE, DELAYED RELEASE ORAL at 06:20

## 2017-05-12 RX ADMIN — VITAMIN D, TAB 1000IU (100/BT) SCH UNITS: 25 TAB at 08:40

## 2017-05-12 RX ADMIN — Medication SCH MG: at 08:39

## 2017-05-12 RX ADMIN — HYDROCODONE BITARTRATE AND ACETAMINOPHEN PRN TAB: 10; 325 TABLET ORAL at 01:58

## 2017-05-12 RX ADMIN — CYANOCOBALAMIN TAB 500 MCG SCH MCG: 500 TAB at 08:39

## 2017-05-15 NOTE — DISCH
FINAL DIAGNOSES:

1. Weakness with anemia refractory to myelodysplastic syndrome.  Continue

    holding Procrit for the next couple of weeks until Oncology appointment at

    [facility].

2. Status post 1 unit of blood transfusion.  Hemoglobin 9.7 on discharge.

3. Dehydration, prerenal, much improved.  

4. Hypercalcemia likely prerenal.  Parathyroid hormone level normal.  Calcium

    11.4 on admission, normal on discharge.

5. Hypothyroidism.  She is on thyroid replacement therapy.

6. Infectious venous stasis ulcers, however much improved.  No antibiotics.

    She has completed antibiotics.  She continues with debridement and E-Stim

    for PT.

7. Recent myocardial infarction.  She is in post-stabilization phase.

    Troponin was 20.  Recent echocardiograms showed only mildly reduced EF with

    only mild hypokinesia, otherwise doing well.  Placed on metoprolol 12.5 mg

    p.o. b.i.d., she is to continue with baby aspirin.

8. History of deep venous thrombosis.  She is on Coumadin also.

9. Diarrhea, improved; however, will start Lomotil.

 

HISTORY:  This 82-year-old female, very frail, she had recently been discharged

from [facility] after suffering from a myocardial infarction.  There she was

subsequently placed in a swing bed, spent several days in a swing bed therapy

for recuperation and rehabilitation, and due to her weakness and also for wound

therapy.  She was eventually felt like she was quite optimized; however, shortly

after she went home as the day is progressed she become significantly weak, had

quite a bit of diarrhea.  When she had seen me at the [facility] she was

quite weak, due to the diarrhea she was quite dehydrated, so we subsequently

admitted her into acute care.  I had spoken with her and the family, stating

most likely she would have to be placed in the long-term care, so she can have

more PT and OT due to her significant medical issues.  The patient does have

myelodysplastic syndrome, that she is requiring frequent blood transfusions.

Since her MI she has been taken off Procrit as this is a relative

contraindication surrounding an MI, hopefully this will be started back in a

couple weeks, so she would not need so many transfusions, it is best to keep her

hemoglobin above 9.

 

HOSPITAL COURSE:  The patient did receive 1 unit of packed red blood cells.  Her

hemoglobin on admission was 7.4, on discharge it was 9.7.  She had more color to

her face.  She regained her strength quite rapidly.  She did require IV fluids.

I held her Lasix which she normally gets every other day.  I also held her

potassium, her diarrhea had much improved; however, she did have one mild

setback with diarrhea, doubtful if it was infectious.  Had a negative C. diff

toxin.  Appetite started picking up.  She has a fall risk, she was ambulatory

with a four-wheeled walker, she did quite well with this.  She remains on

chronic oxygen.  She never became hemodynamically unstable.  We will continue

with her beta-blocker and her aspirin.  She did have an elevated calcium level

greater than 11 on admission, doubtful if it was primary or secondary

hyperparathyroidism, PTH was pending; however, with IV fluids this was

normalized.  I did assess her troponin, it was slightly high at 0.10; however,

that was likely trending down due to previous MI and renal insufficiency.

 

MEDICATIONS:

1. Lomotil one tablet up to t.i.d. p.r.n. for diarrhea (newly added).

2. Lasix (on hold until provider sees the patient next week.).

3. Potassium, hold until provider sees next week.

 

The patient can continue on all other home medications that include aspirin,

Plavix, prednisone, and Coumadin.

 

LABORATORY DATA:  RBC 2.33, hemoglobin 9.6, percent of neutrophils 37%.

 

Sodium 143, potassium 4.5, BUN 26, creatinine 0.73.  GFR greater than 60.

Estimated creatinine clearance around 50.  Troponin is 0.10.  Normal AST and

ALT.  Normal albumin.  Normal protein.

 

Weight on discharge 114 pounds.  Temperature 97, blood pressure 137/73, heart

rate 94, and O2 sats 98% on 2 L.

 

DISPOSITION:  The patient will be discharged from [facility] from acute care

status.  She will be transferred to a long-term care center.  She will receive

physical therapy, occupational therapy.  She will require home oxygen.  She will

have to be seen with a provider next week.  Labs will include CBC, BMP, also

INR.

 

RECOMMENDATIONS AT FOLLOWUP:  Consider starting potassium and Lasix, diarrhea. 

 Follow up on PTH level.

 

MEDICAL DECISION MAKIN minutes spent on this discharge planning process,

pharmacy consultation, and coordination of care.

 

DD:  2017 09:51:52

DT:  2017 06:30:31

Job #:  [id number]

MTDD

## 2017-06-04 ENCOUNTER — HOSPITAL ENCOUNTER (INPATIENT)
Dept: HOSPITAL 77 - KA.ED | Age: 82
LOS: 4 days | Discharge: NURSING, REHAB OR LONG-TERM CARE | DRG: 593 | End: 2017-06-08
Attending: PHYSICIAN ASSISTANT | Admitting: PHYSICIAN ASSISTANT
Payer: MEDICARE

## 2017-06-04 DIAGNOSIS — K21.9: ICD-10-CM

## 2017-06-04 DIAGNOSIS — M35.3: ICD-10-CM

## 2017-06-04 DIAGNOSIS — Z79.899: ICD-10-CM

## 2017-06-04 DIAGNOSIS — R78.81: Primary | ICD-10-CM

## 2017-06-04 DIAGNOSIS — G89.29: ICD-10-CM

## 2017-06-04 DIAGNOSIS — Z79.01: ICD-10-CM

## 2017-06-04 DIAGNOSIS — I50.42: ICD-10-CM

## 2017-06-04 DIAGNOSIS — F32.9: ICD-10-CM

## 2017-06-04 DIAGNOSIS — G47.00: ICD-10-CM

## 2017-06-04 DIAGNOSIS — E03.9: ICD-10-CM

## 2017-06-04 DIAGNOSIS — Z86.711: ICD-10-CM

## 2017-06-04 DIAGNOSIS — D46.4: ICD-10-CM

## 2017-06-04 DIAGNOSIS — E78.00: ICD-10-CM

## 2017-06-04 DIAGNOSIS — I11.0: ICD-10-CM

## 2017-06-04 DIAGNOSIS — R11.0: ICD-10-CM

## 2017-06-04 DIAGNOSIS — D46.9: ICD-10-CM

## 2017-06-04 DIAGNOSIS — Z99.81: ICD-10-CM

## 2017-06-04 DIAGNOSIS — I73.9: ICD-10-CM

## 2017-06-04 DIAGNOSIS — M81.0: ICD-10-CM

## 2017-06-04 DIAGNOSIS — M85.80: ICD-10-CM

## 2017-06-04 DIAGNOSIS — L97.919: ICD-10-CM

## 2017-06-04 DIAGNOSIS — M54.9: ICD-10-CM

## 2017-06-04 DIAGNOSIS — I25.2: ICD-10-CM

## 2017-06-04 DIAGNOSIS — Z86.718: ICD-10-CM

## 2017-06-04 LAB
CHLORIDE SERPL-SCNC: 103 MMOL/L (ref 98–115)
SODIUM SERPL-SCNC: 138 MMOL/L (ref 136–145)

## 2017-06-04 PROCEDURE — 99285 EMERGENCY DEPT VISIT HI MDM: CPT

## 2017-06-04 PROCEDURE — 87040 BLOOD CULTURE FOR BACTERIA: CPT

## 2017-06-04 PROCEDURE — 81001 URINALYSIS AUTO W/SCOPE: CPT

## 2017-06-04 PROCEDURE — 96374 THER/PROPH/DIAG INJ IV PUSH: CPT

## 2017-06-04 PROCEDURE — 85025 COMPLETE CBC W/AUTO DIFF WBC: CPT

## 2017-06-04 PROCEDURE — 87086 URINE CULTURE/COLONY COUNT: CPT

## 2017-06-04 PROCEDURE — 36415 COLL VENOUS BLD VENIPUNCTURE: CPT

## 2017-06-04 PROCEDURE — 83605 ASSAY OF LACTIC ACID: CPT

## 2017-06-04 PROCEDURE — 71010: CPT

## 2017-06-04 PROCEDURE — 80053 COMPREHEN METABOLIC PANEL: CPT

## 2017-06-04 PROCEDURE — P9016 RBC LEUKOCYTES REDUCED: HCPCS

## 2017-06-04 PROCEDURE — 96361 HYDRATE IV INFUSION ADD-ON: CPT

## 2017-06-04 NOTE — EDM.PDOC
ED HPI GENERAL MEDICAL PROBLEM





- General


Chief Complaint: General


Stated Complaint: FEVER


Time Seen by Provider: 06/04/17 18:50


Source of Information: Reports: Patient, EMS, Nursing Home Records





- History of Present Illness


INITIAL COMMENTS - FREE TEXT/NARRATIVE: 





81 yo WF presents to ER with fever and lethagy which began today. Pt is alert 

but responding slowly to commands. Pt has history of MDS- Myelodysplastic 

syndrome. Pt without any recent illness per daughter. No cough, congestion. Pt 

denies any chest pain or shortness of breath. Pt had 1 episode of vomiting 

earlier today. Pt with chills at this time. Pt answers all questions 

appropriately but slow to respnd on exam 


Duration: Day(s): (1)


Location: Reports: Generalized


Severity: Moderate


Improves with: Reports: None


Worsens with: Reports: None


Associated Symptoms: Reports: Fever/Chills, Nausea/Vomiting, Weakness.  Denies: 

Chest Pain, Cough, cough w sputum, Rash, Seizure, Shortness of Breath, Syncope





- Related Data


 Allergies











Allergy/AdvReac Type Severity Reaction Status Date / Time


 


erythromycin base Allergy Severe Vomiting Verified 06/04/17 19:20





[Erythromycin Base]     


 


Penicillins Allergy Unknown Hives Verified 06/04/17 19:20


 


Sulfa (Sulfonamide Allergy  Other Verified 06/04/17 19:20





Antibiotics)     


 


sulfamethoxazole Allergy  Other Verified 06/04/17 19:20





[From Bactrim]     


 


trimethoprim [From Bactrim] Allergy  Other Verified 06/04/17 19:20


 


lenalidomide [From Revlimid] AdvReac Mild Rash Verified 06/04/17 19:20











Home Meds: 


 Home Meds





Cholecalciferol (Vitamin D3) [Vitamin D3] 2,000 unit PO DAILY 12/27/13 [History]


Cyanocobalamin (Vitamin B-12) [B-12 Dots] 500 mcg PO MOTUWETHFR@0900 12/27/13 [

History]


Hydrocodone/Acetaminophen [Hydrocodon-Acetaminophn ] 1 tab PO Q6H PRN 12/ 27/13 [History]


Omeprazole 20 mg PO ACBREAKFAST 12/27/13 [History]


Warfarin [Coumadin] 5 mg PO DAILY@1800 09/02/15 [History]


Polyethylene Glycol 3350 [MiraLAX] 1 packet PO DAILY PRN 09/30/15 [History]


Calcium Carbonate/Vitamin D3 [Calcium 600-Vit D3 400 Tablet] 1 tab PO BIDMEALS 

12/01/15 [History]


Mirtazapine [Remeron] 30 mg PO BEDTIME 04/29/16 [History]


Phytonadione [Vitamin K] 100 mcg PO DAILY 04/29/16 [History]


Pyridoxine HCl [Vitamin B-6] 100 mg PO DAILY 04/29/16 [History]


predniSONE [Prednisone] 5 mg PO DAILY@1800 08/03/16 [History]


Aspirin [Halfprin] 81 mg PO DAILY #90 tab.ec 05/05/17 [Rx]


Clopidogrel [Plavix] 75 mg PO DAILY #90 tablet 05/05/17 [Rx]


Metoprolol Tartrate [Lopressor] 12.5 mg PO BID #0 tablet 05/05/17 [Rx]


Lactobacillus Acidophilus [Acidophilus Lactobacillus] 1 cap PO DAILY 05/09/17 [

History]


Levothyroxine Sodium [Synthroid] 125 mcg PO ACBREAKFAST 05/09/17 [History]


Loperamide [Imodium] 1 tab PO ASDIRECTED PRN 05/09/17 [History]


Melatonin/Pyridoxine HCl (B6) [Melatonin 3 mg Tablet] 1 tab PO BEDTIME PRN 05/09 /17 [History]


Atropine/Diphenoxylate [Lomotil 0.025-2.5 MG] 1 tab PO TID PRN #21 tablet 05/12/ 17 [Rx]


Furosemide [Lasix] 40 mg PO Q2D #0  05/12/17 [Rx]


Potassium Chloride [Klor-Con M20] 10 meq PO Q2D #0  05/12/17 [Rx]











Past Medical History


HEENT History: Reports: Cataract, Hard of Hearing, Impaired Vision, Sinusitis


Cardiovascular History: Reports: Blood Clots/VTE/DVT, High Cholesterol, PVD, 

SOB on Exertion


Respiratory History: Reports: Bronchitis, Recurrent, PE, Pneumonia, Recurrent, 

SOB, Other (See Below)


Other Respiratory History: Chronic use of oxygen


Gastrointestinal History: Reports: Bowel Obstruction, Chronic Diarrhea, Fecal 

Incontinence, GERD


Genitourinary History: Reports: UTI, Recurrent


OB/GYN History: Reports: Endometriosis, Pregnancy


Musculoskeletal History: Reports: Back Pain, Chronic, Fibromyalgia, Other (See 

Below)


Other Musculoskeletal History: POLYMYALGIA


Neurological History: Reports: Vertigo


Psychiatric History: Reports: None


Endocrine/Metabolic History: Reports: Hypothyroidism, Osteopenia, Osteoporosis


Hematologic History: Reports: Anemia, Blood Transfusion(s)


Immunologic History: Reports: None


Oncologic (Cancer) History: Reports: None


Dermatologic History: Reports: None





- Infectious Disease History


Infectious Disease History: Reports: Other (See Below)


Other Infectious Disease History: Unable to assess.





- Past Surgical History


Head Surgeries/Procedures: Reports: None


HEENT Surgical History: Reports: Adenoidectomy, Cataract Surgery, Tonsillectomy


GI Surgical History: Reports: Appendectomy, Cholecystectomy, Colonoscopy, Other 

(See Below)


Endocrine Surgical History: Reports: None


Neurological Surgical History: Reports: None


Oncologic Surgical History: Reports: None





Social & Family History





- Family History


Family Medical History: Noncontributory


HEENT: Reports: None


Cardiac: Reports: None


Respiratory: Reports: None


GI: Reports: None


: Reports: Other (See Below)


OBGYN: Reports: None


Musculoskeletal: Reports: None


Neurological: Reports: None


Psychiatric: Reports: None


Endocrine/Metabolic: Reports: None


Hematologic: Reports: None


Immunologic: Reports: None


Dermatologic: Reports: None


Oncologic: Reports: Hodgkin's Lymphoma





- Tobacco Use


Smoking Status *Q: Never Smoker


Second Hand Smoke Exposure: No





- Caffeine Use


Caffeine Use: Reports: Coffee





- Alcohol Use


Days Per Week of Alcohol Use: 0





- Recreational Drug Use


Recreational Drug Use: No





ED ROS GENERAL





- Review of Systems


Review Of Systems: See Below


Constitutional: Reports: Fever, Chills, Malaise, Weakness


HEENT: Reports: No Symptoms


Respiratory: Reports: No Symptoms


Cardiovascular: Reports: No Symptoms


Endocrine: Reports: No Symptoms


GI/Abdominal: Reports: Nausea, Vomiting


: Reports: No Symptoms


Musculoskeletal: Reports: No Symptoms


Skin: Reports: No Symptoms


Neurological: Reports: No Symptoms


Psychiatric: Reports: No Symptoms


Hematologic/Lymphatic: Reports: No Symptoms


Immunologic: Reports: No Symptoms





ED EXAM, GENERAL





- Physical Exam


Exam: See Below


Exam Limited By: No Limitations


General Appearance: Alert, WD/WN, No Apparent Distress


Eye Exam: Bilateral Eye: EOMI, PERRL


Ears: Normal External Exam, Normal Canal, Hearing Grossly Normal, Normal TMs


Ear Exam: Bilateral Ear: Auricle Normal, Canal Normal, TM normal


Nose: Normal Inspection, Normal Mucosa, No Blood


Throat/Mouth: Normal Inspection, Normal Lips, Normal Teeth, Normal Gums, Normal 

Oropharynx, Normal Voice, No Airway Compromise


Head: Atraumatic, Normocephalic


Neck: Normal Inspection, Supple, Non-Tender, Full Range of Motion


Respiratory/Chest: No Respiratory Distress, Lungs Clear, Normal Breath Sounds, 

No Accessory Muscle Use, Chest Non-Tender


Cardiovascular: Normal Peripheral Pulses, Regular Rate, Rhythm, No Edema, No 

Gallop, No JVD, No Murmur, No Rub


GI/Abdominal: Normal Bowel Sounds, Soft, Non-Tender, No Organomegaly, No 

Distention, No Abnormal Bruit, No Mass


Back Exam: Normal Inspection, Full Range of Motion, NT


Extremities: Normal Inspection, Normal Range of Motion, Non-Tender, Normal 

Capillary Refill, No Pedal Edema


Neurological: Alert, Oriented, CN II-XII Intact, Normal Reflexes, No Motor/

Sensory Deficits, Slow to Respond


Psychiatric: Normal Affect, Normal Mood


Skin Exam: Warm, Dry, Intact, Normal Color, No Rash


Lymphatic: No Adenopathy





Course





- Vital Signs


Last Recorded V/S: 


 Last Vital Signs











Temp  38.7 C H  06/04/17 19:23


 


Pulse      


 


Resp      


 


BP      


 


Pulse Ox      














- Orders/Labs/Meds


Orders: 


 Active Orders 24 hr











 Category Date Time Status


 


 Peripheral IV Care [RC] .AS DIRECTED Care  06/04/17 18:56 Active


 


 Chest 1V Frontal [CR] Stat Exams  06/04/17 18:55 Taken


 


 CULTURE BLOOD [BC] Stat Lab  06/04/17 19:21 Received


 


 CULTURE BLOOD [BC] Stat Lab  06/04/17 19:46 Received


 


 Sodium Chloride 0.9% [Syrex Flush] Med  06/04/17 18:55 Active





 5 ml FLUSH Q8HR PRN   


 


 Blood Culture x2 Reflex Set [OM.PC] Stat Oth  06/04/17 18:55 Ordered


 


 Peripheral IV Insertion Adult [OM.PC] Routine Oth  06/04/17 18:55 Ordered








 Medication Orders





Sodium Chloride (Syrex Flush)  5 ml FLUSH Q8HR PRN


   PRN Reason: Keep Vein Open








Labs: 


 Laboratory Tests











  06/04/17 06/04/17 06/04/17 Range/Units





  18:57 18:57 19:15 


 


WBC  7.0    (5.0-10.0)  10^3/uL


 


RBC  3.09 L    (3.80-5.50)  10^6/uL


 


Hgb  9.3 L    (12.0-16.0)  g/dL


 


Hct  27.7 L    (37.0-47.0)  %


 


MCV  89.6    (82.0-92.0)  fL


 


MCH  30.1    (27.0-31.0)  pg


 


MCHC  33.6    (32.0-36.0)  g/dL


 


RDW  16.1 H    (11.5-14.5)  %


 


Plt Count  264    (150-300)  10^3/uL


 


MPV  8.3    (7.4-10.4)  fL


 


Neut % (Auto)  80.4 H    (50.0-70.0)  %


 


Lymph % (Auto)  16.2 L    (20.0-40.0)  %


 


Mono % (Auto)  2.1    (2.0-8.0)  %


 


Eos % (Auto)  1.1    (1.0-3.0)  %


 


Baso % (Auto)  0.2    (0.0-1.0)  %


 


Neut # (Auto)  5.7    (2.5-7.0)  10^3/uL


 


Lymph # (Auto)  1.1    (1.0-4.0)  10^3/uL


 


Mono # (Auto)  0.1    (0.1-0.8)  10^3/uL


 


Eos # (Auto)  0.1    (0.1-0.3)  10^3/uL


 


Baso # (Auto)  0.0    (0.0-0.1)  10^3/uL


 


Sodium   138   (136-145)  mmol/L


 


Potassium   3.9   (3.3-5.3)  mmol/L


 


Chloride   103   ()  mmol/L


 


Carbon Dioxide   29.6   (21.0-32.0)  mmol/L


 


BUN   28 H   (6-25)  mg/dL


 


Creatinine   1.00   (0.51-1.17)  mg/dL


 


Est Cr Clr Drug Dosing   35.41   mL/min


 


Estimated GFR (MDRD)   53   mL/min


 


Glucose   133 H   ()  mg/dL


 


Lactic Acid     (0.4-2.0)  mmol/L


 


Calcium   10.3   (8.7-10.3)  mg/dL


 


Total Bilirubin   1.0   (0.2-1.0)  mg/dL


 


AST   23   (15-37)  U/L


 


ALT   43   (12-78)  U/L


 


Alkaline Phosphatase   64   ()  IU/L


 


Total Protein   6.3 L   (6.4-8.2)  g/dL


 


Albumin   2.96 L   (3.00-4.80)  g/dL


 


Specimen Type    Urincath  


 


Urine Color    Yellow  (YELLOW)  


 


Urine Appearance    Clear  (CLEAR)  


 


Urine pH    7.0  (5.0-9.0)  


 


Ur Specific Gravity    1.015  (1.005-1.030)  


 


Urine Protein    Negative  (NEGATIVE)  mg/dL


 


Urine Glucose (UA)    Negative  (NEGATIVE)  mg/dL


 


Urine Ketones    Negative  (NEGATIVE)  mg/dL


 


Urine Occult Blood    Negative  (NEGATIVE)  


 


Urine Nitrite    Negative  (NEGATIVE)  


 


Urine Bilirubin    Negative  (NEGATIVE)  


 


Urine Urobilinogen    0.2  (0.2-1.0)  E.U./dL


 


Ur Leukocyte Esterase    Negative  (NEGATIVE)  


 


Urine RBC    0-5  /HPF


 


Urine WBC    Not seen  /HPF


 


Ur Epithelial Cells    Not seen  /LPF


 


Urine Bacteria    Not seen  (NONE TO FEW)  /HPF














  06/04/17 Range/Units





  19:21 


 


WBC   (5.0-10.0)  10^3/uL


 


RBC   (3.80-5.50)  10^6/uL


 


Hgb   (12.0-16.0)  g/dL


 


Hct   (37.0-47.0)  %


 


MCV   (82.0-92.0)  fL


 


MCH   (27.0-31.0)  pg


 


MCHC   (32.0-36.0)  g/dL


 


RDW   (11.5-14.5)  %


 


Plt Count   (150-300)  10^3/uL


 


MPV   (7.4-10.4)  fL


 


Neut % (Auto)   (50.0-70.0)  %


 


Lymph % (Auto)   (20.0-40.0)  %


 


Mono % (Auto)   (2.0-8.0)  %


 


Eos % (Auto)   (1.0-3.0)  %


 


Baso % (Auto)   (0.0-1.0)  %


 


Neut # (Auto)   (2.5-7.0)  10^3/uL


 


Lymph # (Auto)   (1.0-4.0)  10^3/uL


 


Mono # (Auto)   (0.1-0.8)  10^3/uL


 


Eos # (Auto)   (0.1-0.3)  10^3/uL


 


Baso # (Auto)   (0.0-0.1)  10^3/uL


 


Sodium   (136-145)  mmol/L


 


Potassium   (3.3-5.3)  mmol/L


 


Chloride   ()  mmol/L


 


Carbon Dioxide   (21.0-32.0)  mmol/L


 


BUN   (6-25)  mg/dL


 


Creatinine   (0.51-1.17)  mg/dL


 


Est Cr Clr Drug Dosing   mL/min


 


Estimated GFR (MDRD)   mL/min


 


Glucose   ()  mg/dL


 


Lactic Acid  1.5  (0.4-2.0)  mmol/L


 


Calcium   (8.7-10.3)  mg/dL


 


Total Bilirubin   (0.2-1.0)  mg/dL


 


AST   (15-37)  U/L


 


ALT   (12-78)  U/L


 


Alkaline Phosphatase   ()  IU/L


 


Total Protein   (6.4-8.2)  g/dL


 


Albumin   (3.00-4.80)  g/dL


 


Specimen Type   


 


Urine Color   (YELLOW)  


 


Urine Appearance   (CLEAR)  


 


Urine pH   (5.0-9.0)  


 


Ur Specific Gravity   (1.005-1.030)  


 


Urine Protein   (NEGATIVE)  mg/dL


 


Urine Glucose (UA)   (NEGATIVE)  mg/dL


 


Urine Ketones   (NEGATIVE)  mg/dL


 


Urine Occult Blood   (NEGATIVE)  


 


Urine Nitrite   (NEGATIVE)  


 


Urine Bilirubin   (NEGATIVE)  


 


Urine Urobilinogen   (0.2-1.0)  E.U./dL


 


Ur Leukocyte Esterase   (NEGATIVE)  


 


Urine RBC   /HPF


 


Urine WBC   /HPF


 


Ur Epithelial Cells   /LPF


 


Urine Bacteria   (NONE TO FEW)  /HPF











Meds: 


Medications











Generic Name Dose Route Start Last Admin





  Trade Name Freq  PRN Reason Stop Dose Admin


 


Sodium Chloride  5 ml  06/04/17 18:55  





  Syrex Flush  FLUSH   





  Q8HR PRN   





  Keep Vein Open   














Discontinued Medications














Generic Name Dose Route Start Last Admin





  Trade Name Freq  PRN Reason Stop Dose Admin


 


Acetaminophen  975 mg  06/04/17 19:02  06/04/17 19:23





  Tylenol  RECTAL  06/04/17 19:03  975 mg





  NOW ONE   Administration


 


Sodium Chloride  1,000 mls @ 999 mls/hr  06/04/17 18:55  06/04/17 19:10





  Normal Saline  IV  06/04/17 19:55  999 mls/hr





  .BOLUS ONE   Administration


 


Ondansetron HCl  4 mg  06/04/17 18:55  06/04/17 19:22





  Zofran  IVPUSH  06/04/17 18:56  4 mg





  ONETIME ONE   Administration














- Radiology Interpretation


Free Text/Narrative:: 





CXR- NAD





Departure





- Departure


Time of Disposition: 20:23


Disposition: Admitted As Inpatient 66


Condition: fair


Clinical Impression: 


 Bacteremia, Myelodysplastic syndrome





Fever


Qualifiers:


 Fever type: unspecified Qualified Code(s): R50.9 - Fever, unspecified








- Discharge Information





- My Orders


Last 24 Hours: 


My Active Orders





06/04/17 18:55


Chest 1V Frontal [CR] Stat 


Sodium Chloride 0.9% [Syrex Flush]   5 ml FLUSH Q8HR PRN 


Blood Culture x2 Reflex Set [OM.PC] Stat 


Peripheral IV Insertion Adult [OM.PC] Routine 





06/04/17 18:56


Peripheral IV Care [RC] .AS DIRECTED 





06/04/17 19:21


CULTURE BLOOD [BC] Stat 





06/04/17 19:46


CULTURE BLOOD [BC] Stat 














- Assessment/Plan


Last 24 Hours: 


My Active Orders





06/04/17 18:55


Chest 1V Frontal [CR] Stat 


Sodium Chloride 0.9% [Syrex Flush]   5 ml FLUSH Q8HR PRN 


Blood Culture x2 Reflex Set [OM.PC] Stat 


Peripheral IV Insertion Adult [OM.PC] Routine 





06/04/17 18:56


Peripheral IV Care [RC] .AS DIRECTED 





06/04/17 19:21


CULTURE BLOOD [BC] Stat 





06/04/17 19:46


CULTURE BLOOD [BC] Stat 











Assessment:: 





1. fever


2. rigors


3. rule out bacteremia


4. myelodysplastic syndrome





Plan: 





1. admit for rule out bacteremia


2. fever


3. supportive care

## 2017-06-05 LAB
CHLORIDE SERPL-SCNC: 105 MMOL/L (ref 98–115)
SODIUM SERPL-SCNC: 138 MMOL/L (ref 136–145)

## 2017-06-05 PROCEDURE — 30253N1: ICD-10-PCS | Performed by: PHYSICIAN ASSISTANT

## 2017-06-05 RX ADMIN — TAZOBACTAM SODIUM AND PIPERACILLIN SODIUM SCH MLS/HR: 375; 3 INJECTION, SOLUTION INTRAVENOUS at 18:02

## 2017-06-05 RX ADMIN — TAZOBACTAM SODIUM AND PIPERACILLIN SODIUM SCH MLS/HR: 375; 3 INJECTION, SOLUTION INTRAVENOUS at 12:00

## 2017-06-05 RX ADMIN — Medication SCH TAB: at 18:04

## 2017-06-05 RX ADMIN — TAZOBACTAM SODIUM AND PIPERACILLIN SODIUM SCH MLS/HR: 375; 3 INJECTION, SOLUTION INTRAVENOUS at 04:43

## 2017-06-05 RX ADMIN — TAZOBACTAM SODIUM AND PIPERACILLIN SODIUM SCH MLS/HR: 375; 3 INJECTION, SOLUTION INTRAVENOUS at 23:47

## 2017-06-05 RX ADMIN — HYDROCODONE BITARTRATE AND ACETAMINOPHEN PRN TAB: 10; 325 TABLET ORAL at 22:27

## 2017-06-05 RX ADMIN — TAZOBACTAM SODIUM AND PIPERACILLIN SODIUM SCH MLS/HR: 375; 3 INJECTION, SOLUTION INTRAVENOUS at 00:07

## 2017-06-06 LAB
CHLORIDE SERPL-SCNC: 107 MMOL/L (ref 98–115)
SODIUM SERPL-SCNC: 142 MMOL/L (ref 136–145)

## 2017-06-06 RX ADMIN — Medication SCH TAB: at 11:46

## 2017-06-06 RX ADMIN — VITAMIN D, TAB 1000IU (100/BT) SCH UNITS: 25 TAB at 09:22

## 2017-06-06 RX ADMIN — TAZOBACTAM SODIUM AND PIPERACILLIN SODIUM SCH MLS/HR: 375; 3 INJECTION, SOLUTION INTRAVENOUS at 22:02

## 2017-06-06 RX ADMIN — CYANOCOBALAMIN TAB 500 MCG SCH MCG: 500 TAB at 09:22

## 2017-06-06 RX ADMIN — HYDROCODONE BITARTRATE AND ACETAMINOPHEN PRN TAB: 10; 325 TABLET ORAL at 22:08

## 2017-06-06 RX ADMIN — Medication SCH MG: at 09:24

## 2017-06-06 RX ADMIN — HYDROCODONE BITARTRATE AND ACETAMINOPHEN PRN TAB: 10; 325 TABLET ORAL at 09:24

## 2017-06-06 RX ADMIN — TAZOBACTAM SODIUM AND PIPERACILLIN SODIUM SCH MLS/HR: 375; 3 INJECTION, SOLUTION INTRAVENOUS at 11:46

## 2017-06-06 RX ADMIN — POTASSIUM CHLORIDE SCH MEQ: 750 TABLET, FILM COATED, EXTENDED RELEASE ORAL at 09:20

## 2017-06-06 RX ADMIN — TAZOBACTAM SODIUM AND PIPERACILLIN SODIUM SCH MLS/HR: 375; 3 INJECTION, SOLUTION INTRAVENOUS at 17:32

## 2017-06-06 RX ADMIN — Medication SCH TAB: at 17:32

## 2017-06-06 RX ADMIN — TAZOBACTAM SODIUM AND PIPERACILLIN SODIUM SCH MLS/HR: 375; 3 INJECTION, SOLUTION INTRAVENOUS at 05:09

## 2017-06-06 NOTE — PN
06/06/2017 PATIENT NAME:  RAJANI ARAIZA

 

SUBJECTIVE:  The patient states she is still not feeling the best.  She has not

had any more fevers with rigors, but she just feels wiped out.  She just does

not feel well at all.

 

OBJECTIVE:  VITAL SIGNS:  Today, temperature is 97.8, pulse is 65, blood

pressure is 126/67, respiratory rate is 20, oxygen saturations on 2 L nasal

cannula is 96%.

GENERAL:  This is an elderly white female in no acute distress.  She does appear

very weak and fatigued.

HEART:  Heart tones, she does have a very loud harsh murmur on exam.

LUNGS:  Sounds are clear in upper lobes, diminished at bilateral bases.

ABDOMEN:  Soft, nontender, nondistended.  Bowel sounds present x4.

EXTREMITIES:  No pedal edema noted on exam.

 

LABORATORY DATA:  The patient's lab work that was obtained today.  CBC shows a

white count low at 3.9, hemoglobin 9.3.  Chemistry panel is unremarkable.  GFR

stable at 58.  Total protein is low at 5.4.  Albumin is low at 2.35.  Otherwise,

everything is within normal range.

 

IMPRESSION AND PLAN:

1. Recent fever of unknown origin with rigors.  Plan:  We are going to

    continue patient on IV antibiotic therapy of Zosyn IV every 6 hours along

    with vancomycin IV.  Pharmacy will dose the vancomycin.  We are going to

    decrease the patient's IV fluids slightly today.  She has been receiving

    normal saline at 75 mL an hour, and I will decrease that to 50 mL an hour.

    Blood cultures are pending, no growth at this point.  The patient's white

    count has come down to 3.9, which is improvement.

2. Hypothyroidism.  Plan:  Continue with levothyroxine 125 mcg daily.

3. Right leg ulcer wound to her right shin.  Plan:  We are going to

    discontinue the patient's doxycycline.  There is no surrounding erythema.

    We will continue with Bactroban ointment just once a day to the wound with

    dressing change per nursing staff.

4. Recent nausea.  Plan:  Much improved.  She has had no episodes of emesis.

    We will continue with Zofran as needed along with some Protonix, she takes

    in the morning daily.

5. History of myelodysplastic syndromes.  Plan:  We will continue with

    prednisone 5 mg daily.  Her Jadenu is on hold at this time.

6. History of hypertension.  Plan:  Continue with Lasix 40 mg every other day.

    She does take metoprolol tartrate 12.5 mg twice a day.  Blood pressure has

    been stable.

7. History of insomnia.  Plan:  Continue with Remeron 30 mg at bedtime.

8. History of deep venous thrombosis/blood clots.  Plan:  We will continue

    with Coumadin as ordered.  Her INR was slightly low yesterday at 1.5.  We

    will recheck an INR tomorrow morning.  We will continue with Coumadin at

    current dosing.

9. Anemia.  Plan:  The patient's hemoglobin yesterday was low at 8.0.  We

    transfused 1 unit of packed RBCs yesterday.  Her CBC today showed

    improvement that her hemoglobin is up to 9.3.

10.Hypokalemia.  Plan:  The patient's potassium level today is low at 3.5.  I

    am going to give her one time dose of potassium chloride 20 mEq p.o.  We

    will recheck a basic metabolic panel tomorrow morning along with a CBC.

 

DD:  06/06/2017 10:45:01

DT:  06/06/2017 11:14:54

Job #:  [id number]

## 2017-06-07 LAB
CHLORIDE SERPL-SCNC: 111 MMOL/L (ref 98–115)
SODIUM SERPL-SCNC: 146 MMOL/L (ref 136–145)

## 2017-06-07 RX ADMIN — TAZOBACTAM SODIUM AND PIPERACILLIN SODIUM SCH MLS/HR: 375; 3 INJECTION, SOLUTION INTRAVENOUS at 22:38

## 2017-06-07 RX ADMIN — HYDROCODONE BITARTRATE AND ACETAMINOPHEN PRN TAB: 10; 325 TABLET ORAL at 16:46

## 2017-06-07 RX ADMIN — VITAMIN D, TAB 1000IU (100/BT) SCH UNITS: 25 TAB at 09:01

## 2017-06-07 RX ADMIN — TAZOBACTAM SODIUM AND PIPERACILLIN SODIUM SCH MLS/HR: 375; 3 INJECTION, SOLUTION INTRAVENOUS at 05:18

## 2017-06-07 RX ADMIN — Medication SCH MG: at 09:01

## 2017-06-07 RX ADMIN — Medication SCH TAB: at 17:41

## 2017-06-07 RX ADMIN — TAZOBACTAM SODIUM AND PIPERACILLIN SODIUM SCH MLS/HR: 375; 3 INJECTION, SOLUTION INTRAVENOUS at 17:44

## 2017-06-07 RX ADMIN — Medication SCH TAB: at 17:39

## 2017-06-07 RX ADMIN — CYANOCOBALAMIN TAB 500 MCG SCH MCG: 500 TAB at 09:00

## 2017-06-07 RX ADMIN — TAZOBACTAM SODIUM AND PIPERACILLIN SODIUM SCH MLS/HR: 375; 3 INJECTION, SOLUTION INTRAVENOUS at 11:01

## 2017-06-07 RX ADMIN — HYDROCODONE BITARTRATE AND ACETAMINOPHEN PRN TAB: 10; 325 TABLET ORAL at 22:38

## 2017-06-07 RX ADMIN — HYDROCODONE BITARTRATE AND ACETAMINOPHEN PRN TAB: 10; 325 TABLET ORAL at 07:28

## 2017-06-07 RX ADMIN — Medication SCH TAB: at 11:01

## 2017-06-07 NOTE — PCM.PN
- General Info


Date of Service: 06/07/17


Functional Status: Reports: pain controlled, tolerating diet, ambulating, 

urinating.  Denies: new symptoms





- Review of Systems


General: Reports: Weakness, Fatigue, Malaise, Appetite (Improving).  Denies: 

Fever, Chills


Pulmonary: Reports: cough (more of a tickle).  Denies: shortness of breath, 

sputum


Cardiovascular: Reports: Edema.  Denies: Chest Pain


Gastrointestinal: Denies: Abdominal pain, Decreased appetite, Nausea





- Patient Data


Vitals - most recent: 


 Last Vital Signs











Temp  96.6 F   06/07/17 06:46


 


Pulse  64   06/07/17 09:01


 


Resp  20   06/07/17 06:46


 


BP  134/78   06/07/17 09:01


 


Pulse Ox  96   06/07/17 06:46











Weight - most recent: 117 lb 3 oz


I&O - last 24 hours: 


 Intake & Output











 06/06/17 06/07/17 06/07/17





 22:59 06:59 14:59


 


Intake Total 1208 470 232


 


Balance 1208 470 232











Lab Results last 24 hrs: 


 Laboratory Results - last 24 hr











  06/07/17 06/07/17 06/07/17 Range/Units





  07:20 07:20 07:20 


 


WBC   3.5 L   (5.0-10.0)  10^3/uL


 


RBC   3.27 L   (3.80-5.50)  10^6/uL


 


Hgb   9.5 L   (12.0-16.0)  g/dL


 


Hct   28.8 L   (37.0-47.0)  %


 


MCV   88.3   (82.0-92.0)  fL


 


MCH   29.1   (27.0-31.0)  pg


 


MCHC   33.0   (32.0-36.0)  g/dL


 


RDW   15.5 H   (11.5-14.5)  %


 


Plt Count   159   (150-300)  10^3/uL


 


MPV   8.7   (7.4-10.4)  fL


 


Neut % (Auto)   49.2 L   (50.0-70.0)  %


 


Lymph % (Auto)   35.9   (20.0-40.0)  %


 


Mono % (Auto)   6.5   (2.0-8.0)  %


 


Eos % (Auto)   7.9 H   (1.0-3.0)  %


 


Baso % (Auto)   0.5   (0.0-1.0)  %


 


Neut # (Auto)   1.7 L   (2.5-7.0)  10^3/uL


 


Lymph # (Auto)   1.3   (1.0-4.0)  10^3/uL


 


Mono # (Auto)   0.2   (0.1-0.8)  10^3/uL


 


Eos # (Auto)   0.3   (0.1-0.3)  10^3/uL


 


Baso # (Auto)   0.0   (0.0-0.1)  10^3/uL


 


PT  22.8 H    (8.9-11.4)  SEC


 


INR  2.2 H    (0.9-1.1)  


 


Sodium    146 H  (136-145)  mmol/L


 


Potassium    3.5  (3.3-5.3)  mmol/L


 


Chloride    111  ()  mmol/L


 


Carbon Dioxide    25.0  (21.0-32.0)  mmol/L


 


BUN    17  (6-25)  mg/dL


 


Creatinine    0.90  (0.51-1.17)  mg/dL


 


Est Cr Clr Drug Dosing    40.44  mL/min


 


Estimated GFR (MDRD)    60  mL/min


 


Glucose    82  ()  mg/dL


 


Calcium    8.7  (8.7-10.3)  mg/dL











Med Orders - Current: 


 Current Medications





Acetaminophen (Tylenol)  650 mg PO Q6H PRN


   PRN Reason: Pain (Mild 1-3)/fever


Hydrocodone Bitart/Acetaminophen (Norco 325-10 Mg)  1 tab PO Q6H PRN


   PRN Reason: Pain


   Last Admin: 06/07/17 07:28 Dose:  1 tab


Aspirin (Halfprin)  81 mg PO DAILY [facility]


   Last Admin: 06/07/17 08:59 Dose:  81 mg


Calcium Citrate (Calcium Citrate + D)  2 tab PO BID@1200,1800 [facility]


   Last Admin: 06/07/17 11:01 Dose:  2 tab


Cholecalciferol (Vitamin D3)  2,000 units PO DAILY [facility]


   Last Admin: 06/07/17 09:01 Dose:  2,000 units


Clopidogrel Bisulfate (Plavix)  75 mg PO DAILY [facility]


   Last Admin: 06/07/17 09:00 Dose:  75 mg


Cyanocobalamin (Vitamin B12)  500 mcg PO MOTUWETHFR@0900 [facility]


   Last Admin: 06/07/17 09:00 Dose:  500 mcg


Diphenoxylate HCl/Atropine (Lomotil 0.025-2.5 Mg)  1 tab PO TID PRN


   PRN Reason: Diarrhea


Furosemide (Lasix)  40 mg PO Q2D [facility]


   Last Admin: 06/06/17 09:20 Dose:  40 mg


Piperacillin/Tazobactam/ (Dextrose 3.375 gm/ Premix)  50 mls @ 100 mls/hr IV 

Q6H [facility]


   Last Admin: 06/07/17 11:01 Dose:  100 mls/hr


Vancomycin HCl 0.75 gm/ Sodium (Chloride)  250 mls @ 166.667 mls/hr IV Q24H [facility]


   Last Admin: 06/06/17 19:46 Dose:  166.667 mls/hr


Lactobacillus Acidophilus/Rhamnosus (Multi-Cynthia Plus)  1 cap PO DAILY@1800 [facility]


   Last Admin: 06/06/17 17:33 Dose:  1 cap


Levothyroxine Sodium (Synthroid)  125 mcg PO ACBREAKFAST [facility]


   Last Admin: 06/07/17 06:20 Dose:  125 mcg


Melatonin (Melatonin)  3 mg PO BEDTIME PRN


   PRN Reason: Insomnia


Metoprolol Tartrate (Lopressor)  12.5 mg PO BID [facility]


   Last Admin: 06/07/17 09:01 Dose:  12.5 mg


Mirtazapine (Remeron)  30 mg PO BEDTIME [facility]


   Last Admin: 06/06/17 20:41 Dose:  30 mg


Mupirocin (Bactroban Oint)  0 gm TOP DAILY [facility]


   Last Admin: 06/07/17 08:59 Dose:  Not Given


Ondansetron HCl (Zofran)  4 mg IV Q6H PRN


   PRN Reason: Nausea/Vomiting


Pantoprazole Sodium (Protonix***)  40 mg PO ACBREAKFAST [facility]


   Last Admin: 06/07/17 06:20 Dose:  40 mg


Phytonadione (Vitamin K)  100 mcg PO DAILY [facility]


   Last Admin: 06/07/17 09:01 Dose:  100 mcg


Potassium Chloride (Klor-Con 10)  10 meq PO Q2D [facility]


   Last Admin: 06/06/17 09:20 Dose:  10 meq


Prednisolone Acetate (Pred Forte 1% Ophth Susp)  0 ml EYEBOTH DAILY [facility]


   Stop: 06/13/17 09:01


   Last Admin: 06/07/17 09:00 Dose:  1 drop


Prednisone (Prednisone)  5 mg PO DAILY@1800 [facility]


   Last Admin: 06/06/17 17:33 Dose:  5 mg


Pyridoxine HCl (Vitamin B6-Pyridoxine)  100 mg PO DAILY [facility]


   Last Admin: 06/07/17 09:01 Dose:  100 mg


Sodium Chloride (Syrex Flush)  5 ml FLUSH Q8HR PRN


   PRN Reason: Keep Vein Open


Vancomycin HCl (Pharmacy To Dose - Vancomycin)  1 dose .XX ASDIRECTED [facility]


Warfarin Sodium (Coumadin)  5 mg PO DAILY@1800 [facility]


   Last Admin: 06/06/17 17:32 Dose:  5 mg





Discontinued Medications





Acetaminophen (Tylenol)  975 mg RECTAL NOW ONE


   Stop: 06/04/17 19:03


   Last Admin: 06/04/17 19:23 Dose:  975 mg


Acetaminophen (Tylenol)  650 mg PO Q4H PRN


   PRN Reason: Pain (Mild 1-3)/fever


   Last Admin: 06/05/17 09:54 Dose:  650 mg


Doxycycline Hyclate (Vibramycin)  100 mg PO BID [facility]


   Stop: 06/06/17 09:01


   Last Admin: 06/05/17 20:21 Dose:  100 mg


Sodium Chloride (Normal Saline)  1,000 mls @ 999 mls/hr IV .BOLUS ONE


   Stop: 06/04/17 19:55


   Last Admin: 06/04/17 19:10 Dose:  999 mls/hr


Piperacillin Sod/Tazobactam (Sod 4.5 gm/ Sodium Chloride)  100 mls @ 200 mls/hr 

IV Q6H [facility]


   Last Admin: 06/04/17 22:34 Dose:  Not Given


Vancomycin HCl 1 gm/ Sodium (Chloride)  250 mls @ 167 mls/hr IV ONETIME ONE


   Stop: 06/04/17 21:56


   Last Admin: 06/04/17 21:46 Dose:  167 mls/hr


Sodium Chloride (Normal Saline)  1,000 mls @ 50 mls/hr IV ASDIRECTED [facility]


   Last Admin: 06/06/17 13:21 Dose:  50 mls/hr


Sodium Chloride (Normal Saline)  1,000 mls @ 50 mls/hr IV ASDIRECTED [facility]


Mupirocin (Bactroban Oint)  0 gm TOP TID [facility]


   Last Admin: 06/05/17 16:08 Dose:  Not Given


Ondansetron HCl (Zofran)  4 mg IVPUSH ONETIME ONE


   Stop: 06/04/17 18:56


   Last Admin: 06/04/17 19:22 Dose:  4 mg


Potassium Chloride (Klor-Con M20)  20 meq PO ONETIME ONE


   Stop: 06/06/17 12:01


   Last Admin: 06/06/17 11:50 Dose:  20 meq


Prednisolone Acetate (Pred Forte 1% Ophth Susp)  0 ml EYEBOTH DAILY [facility]


Prednisolone Acetate (Pred Forte 1% Ophth Susp)  0 ml EYEBOTH BID [facility]


   Stop: 06/06/17 21:01


   Last Admin: 06/06/17 20:41 Dose:  1 drop


Sodium Chloride (Syrex Flush)  5 ml FLUSH Q8HR PRN


   PRN Reason: Keep Vein Open


Vancomycin HCl (Pharmacy To Dose - Vancomycin)  1 dose .XX ASDIRECTED [facility]











- Exam


Quality Assessment: supplemental oxygen (2 liters nasal cannula), DVT 

prophylaxis (Warfarin)


General: alert, oriented, cooperative, no acute distress


Lungs: Normal respiratory effort, Crackles (Faint crackles to left lower lobes-

clears with cough), Other (All other lung fields clear throughout except LLL)


Cardiovascular: Regular Rate, Regular Rhythm


Abdomen: bowel sounds present, soft, no tenderness, no distension


Extremities: edema (trace edema to BLE)


Skin: warm, dry


Neurological: normal speech


Psy/Mental Status: alert, normal affect, normal mood





- Problem List Review


Problem List Initiated/Reviewed/Updated: Yes





- Plan


Plan:: 





PRIMARY ASSESSMENT/PLAN: 





Fever of unknown origin, resolving. Blood cultures x 2 negative. Urine culture 

negative. Chest x-ray shows no acute process. WBC improving to 3.5 which is 

nearing patient's baseline. Continue IV zosyn and vancomycin. Discontinue IV 

fluids as patient is tolerating oral fluids well. 





Right lower leg wound. Continue bactroban ointment daily with dressing change. 

Doxycycline has been discontinued. 





Nausea, resolved. 





Generalized weakness and deconditioning. Patient is to be up moving around 

today with assistance. 





Depression in remission. PHQ9 score of 7 and EMMA score of 1. Discussion with 

patient reveals no feelings of depression. Family reviewed PHQ9 with patient 

recently and score was 4. Patient is on remeron, but this is for insomnia.  





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 Hgb 9.5. She has 

received 1 unit of PRBCs since admission. Repeat CBC in AM. 





Hypokalemia, resolved. K 3.5. She is on oral potassium every 2 days. BMP in AM. 





SECONDARY ASSESSMENT/PLAN: 





Hypothyroidism. Continue levothyroxine. 





Myelodysplastic syndrome. Continue prednisone. Continue holding Jadenu. 





Hypertension, stable. Continue lopressor BID. 





History of combined congestive heart failure. Ejection fraction 75% with normal 

left ventricle size, hyperdynamic systolic function, and mild diastolic 

dysfunction on ECHO July 2015. Continue lasix every other day. 





CAD. Continue plavix. 





History of hypercholestrolemia. 





Insomnia. Continue remeron. 





History of DVT and PE. Continue coumadin. INR improved to 2.2. Repeat INR in 

AM. 





GERD. Continue protonix. 





Polymalgia rheumatica. 





Chronic fatigue. 





Chronic back pain, on pain contract agreement. 





DVT prophylaxis. On coumadin, INR 2.2. 





Overall plan: Discontinue IV fluids. Continue IV antibiotics as source of 

infection not known. She could benefit from 3-4 more days of antibiotics. 

Increase physical activity today.

## 2017-06-08 VITALS — DIASTOLIC BLOOD PRESSURE: 69 MMHG | SYSTOLIC BLOOD PRESSURE: 143 MMHG

## 2017-06-08 LAB
CHLORIDE SERPL-SCNC: 109 MMOL/L (ref 98–115)
SODIUM SERPL-SCNC: 144 MMOL/L (ref 136–145)

## 2017-06-08 RX ADMIN — CYANOCOBALAMIN TAB 500 MCG SCH MCG: 500 TAB at 08:44

## 2017-06-08 RX ADMIN — Medication SCH: at 11:46

## 2017-06-08 RX ADMIN — Medication SCH MG: at 08:44

## 2017-06-08 RX ADMIN — TAZOBACTAM SODIUM AND PIPERACILLIN SODIUM SCH MLS/HR: 375; 3 INJECTION, SOLUTION INTRAVENOUS at 10:10

## 2017-06-08 RX ADMIN — HYDROCODONE BITARTRATE AND ACETAMINOPHEN PRN TAB: 10; 325 TABLET ORAL at 08:40

## 2017-06-08 RX ADMIN — VITAMIN D, TAB 1000IU (100/BT) SCH UNITS: 25 TAB at 08:45

## 2017-06-08 RX ADMIN — POTASSIUM CHLORIDE SCH MEQ: 750 TABLET, FILM COATED, EXTENDED RELEASE ORAL at 08:41

## 2017-06-08 RX ADMIN — TAZOBACTAM SODIUM AND PIPERACILLIN SODIUM SCH MLS/HR: 375; 3 INJECTION, SOLUTION INTRAVENOUS at 05:42

## 2017-06-08 NOTE — DISCH
06/08/2017PATIENT NAME:  RAJANI ARAIZA

 

SUBJECTIVE:  This is an 82-year-old female patient from [facility], who

was admitted to the hospital on 06/04/2017 because of complaints of fever,

lethargy, and not responding, and responding very slowly to commands.  She has a

previous history of myelodysplastic syndrome.  She had been doing fairly well at

home.  She denied having a cough or congestion.  No chest pain or shortness of

breath.  She had one episode of vomiting earlier in the day.  She did have some

chills at the time of admission.  She answers all questions appropriately at the

time of admission.

 

PAST MEDICAL HISTORY:  Positive for cataract extraction, DVT, high cholesterol

levels, peripheral vascular disease, chronic ulcer of the right lower extremity

as a consequence of PVD and venous insufficiency, shortness of breath, recurrent

bronchitis, recurrent PE, and chronic use of oxygen.  She also has had a history

of adenoidectomy, cataract surgery, tonsillectomy, appendectomy,

cholecystectomy, colonoscopy, etc.  The patient has history of hypothyroidism,

osteopenia, osteoporosis, and myelodysplastic syndrome requiring frequent blood

transfusions as often as every two weeks.  She has a history of recent MI as

well as recurrent pneumonia, and chronic diarrhea without obstruction, GERD, and

polymyalgia rheumatica.  Her medication list has been reviewed.

 

OBJECTIVE:  The patient is admitted to the hospital and treated with IV

vancomycin and Zosyn.  Her condition improved.  Temperature remained normal.

Pulse is good.  Vital signs are stable.  Pain was improved.  Diarrhea is getting

better.  Fatigue and malaise also improved.  Her edema of the lower extremities

improved.  The ulcer of the right lower leg also was improving.  On 06/08/2017,

the patient's current medical condition had improved quite nicely.  She was

ready for discharge to the nursing home.

 

LABORATORY DATA:  Her lab data initially; white count on admission was 6000.

Today it was 3.6, hemoglobin was 8 on admission.  On discharge was 9.7 with 1

unit of packed cells being given during her admission here.  Platelet count was

178 on discharge.  213 on admission.  Her electrolytes remained normal.  Kidney

functions remained normal.  Her vital signs also remained normal.  Intake/output

is also good.

 

MEDICATIONS:  The patient is being discharged today on the following

medications; Tylenol 650 mg q.8 hours on a p.r.n. basis; hydrocodone with

acetaminophen 325, 10 mg q.6 hours on a p.r.n. basis; enteric coated aspirin 81

mg daily; calcium citrate 2 tablets b.i.d.; vitamin D3 2000 units daily; Plavix

75 mg daily; B12 500 mcg daily; Lomotil 1 tablet up to three times a day as

necessary for diarrhea; Lasix to 40 mg every other day; probiotic one capsule

daily; Synthroid 125 mcg daily; melatonin 3 mg at bedtime on a p.r.n. basis;

metoprolol 12.5 mg once a day; mirtazapine which is the Remeron 15 mg at

bedtime; Zofran 4 mg on a p.r.n. basis p.o.; pantoprazole 40 mg daily; vitamin K

100 mcg daily; prednisolone acetate 1% eye drops, both eyes once a day;

prednisone 5 mg daily; pyridoxine B6 100 mg daily.

 

OVERALL PLAN:  The patient will be seen in followup in one week.  Continued

follow up will be advised.  The patient's condition is precarious and rehab

potential is poor.  The patient will also take levofloxacin 250 mg daily for

five days.

 

DD:  06/08/2017 09:55:07

DT:  06/08/2017 10:38:54

Job #:  [id number]

## 2017-07-10 NOTE — PN
07/07/2017PATIENT NAME:  ARJANI ARAIZA

 

ADDENDUM:

 

FINAL DIAGNOSES:

1. Fever, improved.

2. Lethargy, improved.

3. History of myelodysplastic syndrome, stable.

4. History of hypercholesteremia, stable.

5. History of anemia, stable.

6. History of peripheral vascular disease, stable.

7. History of chronic ulcer of the right lower extremity.

8. History of venous insufficiency.

9. History of recurrent bronchitis and recurrent pulmonary embolism.

10.Chronic use of oxygen for hypoxemia.

11.Status post adenoidectomy, cataract surgery, tonsillectomy, appendectomy,

    cholecystectomy, and colonoscopy.

12.History of hypothyroidism.

13.History of osteopenia.

14.History of osteoporosis.

15.History of polymyalgia rheumatica.

 

DD:  07/07/2017 17:56:08

DT:  07/07/2017 18:39:03

Job #:  [id number]

## 2017-07-20 ENCOUNTER — HOSPITAL ENCOUNTER (INPATIENT)
Dept: HOSPITAL 77 - KA.ED | Age: 82
LOS: 5 days | Discharge: NURSING, REHAB OR LONG-TERM CARE | DRG: 206 | End: 2017-07-25
Attending: PHYSICIAN ASSISTANT | Admitting: PHYSICIAN ASSISTANT
Payer: MEDICARE

## 2017-07-20 DIAGNOSIS — R53.81: ICD-10-CM

## 2017-07-20 DIAGNOSIS — Z79.01: ICD-10-CM

## 2017-07-20 DIAGNOSIS — K76.6: ICD-10-CM

## 2017-07-20 DIAGNOSIS — I48.0: ICD-10-CM

## 2017-07-20 DIAGNOSIS — E03.9: ICD-10-CM

## 2017-07-20 DIAGNOSIS — J98.11: ICD-10-CM

## 2017-07-20 DIAGNOSIS — I50.9: ICD-10-CM

## 2017-07-20 DIAGNOSIS — R50.9: ICD-10-CM

## 2017-07-20 DIAGNOSIS — Z86.718: ICD-10-CM

## 2017-07-20 DIAGNOSIS — R11.2: ICD-10-CM

## 2017-07-20 DIAGNOSIS — N30.00: Primary | ICD-10-CM

## 2017-07-20 DIAGNOSIS — E83.111: ICD-10-CM

## 2017-07-20 DIAGNOSIS — D46.9: ICD-10-CM

## 2017-07-20 DIAGNOSIS — R91.8: ICD-10-CM

## 2017-07-20 DIAGNOSIS — Z88.8: ICD-10-CM

## 2017-07-20 DIAGNOSIS — G47.00: ICD-10-CM

## 2017-07-20 DIAGNOSIS — D63.8: ICD-10-CM

## 2017-07-20 DIAGNOSIS — Z79.899: ICD-10-CM

## 2017-07-20 LAB
CHLORIDE SERPL-SCNC: 102 MMOL/L (ref 98–115)
SODIUM SERPL-SCNC: 137 MMOL/L (ref 136–145)

## 2017-07-20 PROCEDURE — 93005 ELECTROCARDIOGRAM TRACING: CPT

## 2017-07-20 PROCEDURE — 96361 HYDRATE IV INFUSION ADD-ON: CPT

## 2017-07-20 PROCEDURE — 71010: CPT

## 2017-07-20 PROCEDURE — 99285 EMERGENCY DEPT VISIT HI MDM: CPT

## 2017-07-20 PROCEDURE — 87040 BLOOD CULTURE FOR BACTERIA: CPT

## 2017-07-20 PROCEDURE — 96374 THER/PROPH/DIAG INJ IV PUSH: CPT

## 2017-07-20 PROCEDURE — 82550 ASSAY OF CK (CPK): CPT

## 2017-07-20 PROCEDURE — 81001 URINALYSIS AUTO W/SCOPE: CPT

## 2017-07-20 PROCEDURE — 84484 ASSAY OF TROPONIN QUANT: CPT

## 2017-07-20 PROCEDURE — 85025 COMPLETE CBC W/AUTO DIFF WBC: CPT

## 2017-07-20 PROCEDURE — 85730 THROMBOPLASTIN TIME PARTIAL: CPT

## 2017-07-20 PROCEDURE — P9016 RBC LEUKOCYTES REDUCED: HCPCS

## 2017-07-20 PROCEDURE — 82553 CREATINE MB FRACTION: CPT

## 2017-07-20 PROCEDURE — 80053 COMPREHEN METABOLIC PANEL: CPT

## 2017-07-20 PROCEDURE — 36415 COLL VENOUS BLD VENIPUNCTURE: CPT

## 2017-07-20 PROCEDURE — 83690 ASSAY OF LIPASE: CPT

## 2017-07-20 PROCEDURE — 85610 PROTHROMBIN TIME: CPT

## 2017-07-20 RX ADMIN — LEVOFLOXACIN SCH MLS/HR: 500 INJECTION, SOLUTION INTRAVENOUS at 20:11

## 2017-07-20 RX ADMIN — HYDROCODONE BITARTRATE AND ACETAMINOPHEN PRN TAB: 10; 325 TABLET ORAL at 22:18

## 2017-07-20 NOTE — EDM.PDOC
ED HPI GENERAL MEDICAL PROBLEM





- General


Chief Complaint: General


Stated Complaint: DEHYDRATED???


Time Seen by Provider: 07/20/17 17:45


Source of Information: Reports: Patient


History Limitations: Reports: No Limitations





- History of Present Illness


INITIAL COMMENTS - FREE TEXT/NARRATIVE: 





83 YO WF with PMH or myelodysplastic disorder who presents to ER feeling weak 

with associated nausea/vomiting. Pt was sitting outside today for approximately 

1 hour and when she came inside she began to feel bad. Pt was found to have low 

grade fever 100 here in ER. Pt denies any chest pain, shortness of breath, 

cough or congestion. Pt has had history of UTI but currently denies dysuria or 

urinary frequency. 


Onset: Today


Onset Date: 07/20/17


Onset Time: 15:00


Location: Reports: Generalized


Severity: Mild


Improves with: Reports: None


Worsens with: Reports: None


Associated Symptoms: Reports: Fever/Chills, Loss of Appetite, Malaise, Nausea/

Vomiting.  Denies: Confusion, Chest Pain, Cough, cough w sputum, Headaches, Rash

, Seizure, Shortness of Breath, Syncope





- Related Data


 Allergies











Allergy/AdvReac Type Severity Reaction Status Date / Time


 


erythromycin base Allergy Severe Vomiting Verified 07/20/17 19:59





[Erythromycin Base]     


 


Penicillins Allergy Unknown Hives Verified 07/20/17 19:59


 


Sulfa (Sulfonamide Allergy  Other Verified 07/20/17 19:59





Antibiotics)     


 


sulfamethoxazole Allergy  Other Verified 07/20/17 19:59





[From Bactrim]     


 


trimethoprim [From Bactrim] Allergy  Other Verified 07/20/17 19:59


 


lenalidomide [From Revlimid] AdvReac Mild Rash Verified 07/20/17 19:59











Home Meds: 


 Home Meds





Cholecalciferol (Vitamin D3) [Vitamin D3] 2,000 unit PO DAILY 12/27/13 [History]


Cyanocobalamin (Vitamin B-12) [B-12 Dots] 500 mcg PO MOTUWETHFR 12/27/13 [

History]


Hydrocodone/Acetaminophen [Hydrocodon-Acetaminophn ] 1 tab PO Q4H PRN 12/ 27/13 [History]


Warfarin [Coumadin] 5 mg PO ASDIRECTED 09/02/15 [History]


Calcium Carbonate/Vitamin D3 [Calcium 600-Vit D3 400 Tablet] 1 tab PO BIDMEALS 

12/01/15 [History]


Mirtazapine [Remeron] 15 mg PO BEDTIME 04/29/16 [History]


Phytonadione [Vitamin K] 100 mcg PO DAILY 04/29/16 [History]


Pyridoxine HCl [Vitamin B-6] 100 mg PO DAILY 04/29/16 [History]


Aspirin [Halfprin] 81 mg PO DAILY #90 tab.ec 05/05/17 [Rx]


Lactobacillus Acidophilus [Acidophilus Lactobacillus] 1 cap PO DAILY 05/09/17 [

History]


Levothyroxine Sodium [Synthroid] 125 mcg PO ACBREAKFAST 05/09/17 [History]


Melatonin/Pyridoxine HCl (B6) [Melatonin 3 mg Tablet] 1 tab PO BEDTIME PRN 05/09 /17 [History]


Furosemide [Lasix] 40 mg PO Q2D #0  05/12/17 [Rx]


Potassium Chloride [Klor-Con M20] 10 meq PO Q2D #0  05/12/17 [Rx]


Deferasirox [Jadenu] 500 mg PO DAILY 06/04/17 [History]


Pantoprazole Sodium [Protonix] 20 mg PO ACBREAKFAST 06/04/17 [History]


Atropine/Diphenoxylate [Lomotil 0.025-2.5 MG] 1 tab PO ASDIRECTED PRN 06/22/17 [

History]


Metoprolol Tartrate [Lopressor] 12.5 mg PO DAILY 06/22/17 [History]


Warfarin [Coumadin] 7.5 mg PO ASDIRECTED 06/22/17 [History]


Non-Formulary Medication [NF Drug] 1 drop EYEBOTH DAILY 07/20/17 [History]


predniSONE [Annabelle] 4 mg PO DAILY 07/20/17 [History]











Past Medical History


HEENT History: Reports: Cataract, Hard of Hearing, Impaired Vision, Sinusitis


Cardiovascular History: Reports: Blood Clots/VTE/DVT, High Cholesterol, PVD, 

SOB on Exertion


Respiratory History: Reports: Bronchitis, Recurrent, PE, Pneumonia, Recurrent, 

SOB, Other (See Below)


Other Respiratory History: Chronic use of oxygen


Gastrointestinal History: Reports: Bowel Obstruction, Chronic Diarrhea, Fecal 

Incontinence, GERD


Genitourinary History: Reports: UTI, Recurrent


OB/GYN History: Reports: Endometriosis, Pregnancy


Musculoskeletal History: Reports: Back Pain, Chronic, Fibromyalgia, Other (See 

Below)


Other Musculoskeletal History: POLYMYALGIA


Neurological History: Reports: Vertigo


Psychiatric History: Reports: None


Endocrine/Metabolic History: Reports: Hypothyroidism, Osteopenia, Osteoporosis


Hematologic History: Reports: Anemia, Blood Transfusion(s)


Immunologic History: Reports: None


Oncologic (Cancer) History: Reports: None


Dermatologic History: Reports: None





- Infectious Disease History


Infectious Disease History: Reports: Other (See Below)


Other Infectious Disease History: Unable to assess.





- Past Surgical History


Head Surgeries/Procedures: Reports: None


HEENT Surgical History: Reports: Adenoidectomy, Cataract Surgery, Tonsillectomy


GI Surgical History: Reports: Appendectomy, Cholecystectomy, Colonoscopy, Other 

(See Below)


Endocrine Surgical History: Reports: None


Neurological Surgical History: Reports: None


Oncologic Surgical History: Reports: None





Social & Family History





- Family History


Family Medical History: Noncontributory


HEENT: Reports: None


Cardiac: Reports: None


Respiratory: Reports: None


GI: Reports: None


: Reports: Other (See Below)


OBGYN: Reports: None


Musculoskeletal: Reports: None


Neurological: Reports: None


Psychiatric: Reports: None


Endocrine/Metabolic: Reports: None


Hematologic: Reports: None


Immunologic: Reports: None


Dermatologic: Reports: None


Oncologic: Reports: Hodgkin's Lymphoma





- Tobacco Use


Smoking Status *Q: Never Smoker


Second Hand Smoke Exposure: No





- Caffeine Use


Caffeine Use: Reports: Coffee





- Alcohol Use


Days Per Week of Alcohol Use: 0





- Recreational Drug Use


Recreational Drug Use: No





ED ROS GENERAL





- Review of Systems


Review Of Systems: See Below


Constitutional: Reports: Fever, Chills, Malaise


HEENT: Reports: No Symptoms


Respiratory: Reports: No Symptoms


Cardiovascular: Reports: No Symptoms


Endocrine: Reports: No Symptoms


GI/Abdominal: Reports: No Symptoms


: Reports: No Symptoms


Musculoskeletal: Reports: No Symptoms


Skin: Reports: No Symptoms


Neurological: Reports: No Symptoms


Psychiatric: Reports: No Symptoms


Hematologic/Lymphatic: Reports: No Symptoms


Immunologic: Reports: No Symptoms





ED EXAM, GENERAL





- Physical Exam


Exam: See Below


Exam Limited By: No Limitations


General Appearance: Alert, WD/WN, No Apparent Distress


Nose: Normal Inspection, Normal Mucosa, No Blood


Throat/Mouth: Normal Inspection, Normal Lips, Normal Teeth, Normal Gums, Normal 

Oropharynx, Normal Voice, No Airway Compromise


Head: Atraumatic, Normocephalic


Neck: Normal Inspection, Supple, Non-Tender, Full Range of Motion


Respiratory/Chest: No Respiratory Distress, Lungs Clear, Normal Breath Sounds, 

No Accessory Muscle Use, Chest Non-Tender


Cardiovascular: Normal Peripheral Pulses, Regular Rate, Rhythm, No Edema, No 

Gallop, No JVD, No Murmur, No Rub


GI/Abdominal: Normal Bowel Sounds, Soft, Non-Tender, No Organomegaly, No 

Distention, No Abnormal Bruit, No Mass


Back Exam: Normal Inspection


Extremities: Normal Inspection, Normal Range of Motion, Non-Tender, Normal 

Capillary Refill, No Pedal Edema


Neurological: Alert, Oriented, CN II-XII Intact, Normal Cognition, Normal Gait, 

Normal Reflexes, No Motor/Sensory Deficits


Psychiatric: Normal Affect, Normal Mood


Lymphatic: No Adenopathy





EKG INTERPRETATION


EKG Date: 07/20/17


Time: 18:12


Rhythm: NSR


Rate (Beats/Min): 96


Axis: Normal


P-Wave: Present


QRS: Normal


ST-T: Normal


QT: Normal


Comparison: NA - No Prior EKG





Course





- Vital Signs


Last Recorded V/S: 


 Last Vital Signs











Temp  37.7 C   07/20/17 19:52


 


Pulse  84   07/20/17 19:52


 


Resp  24 H  07/20/17 19:52


 


BP  101/59 L  07/20/17 19:52


 


Pulse Ox  95   07/20/17 19:55














- Orders/Labs/Meds


Orders: 


 Active Orders 24 hr











 Category Date Time Status


 


 EKG Documentation Completion [RC] ASDIRECTED Care  07/20/17 17:54 Inactive


 


 Peripheral IV Care [RC] .AS DIRECTED Care  07/20/17 17:54 Inactive


 


 Chest 1V Frontal [CR] Stat Exams  07/20/17 17:53 Taken


 


 CULTURE BLOOD [BC] Stat Lab  07/20/17 17:55 Received


 


 CULTURE BLOOD [BC] Stat Lab  07/20/17 18:30 Received








 Medication Orders





Acetaminophen (Tylenol)  650 mg PO Q4H PRN


   PRN Reason: Pain (Mild 1-3)/fever


Levofloxacin/Dextrose 500 mg/ (Premix)  100 mls @ 100 mls/hr IV Q24H [facility]


   Last Admin: 07/20/17 20:11  Dose: 100 mls/hr


Sodium Chloride (Normal Saline)  1,000 mls @ 75 mls/hr IV ASDIRECTED [facility]


   Last Admin: 07/20/17 20:11  Dose: 75 mls/hr


Ondansetron HCl (Zofran)  4 mg IV Q6H PRN


   PRN Reason: Nausea/Vomiting


Sodium Chloride (Syrex Flush)  5 ml FLUSH Q8HR PRN


   PRN Reason: Keep Vein Open








Labs: 


 Laboratory Tests











  07/20/17 07/20/17 07/20/17 Range/Units





  17:55 17:55 17:55 


 


WBC  5.6    (5.0-10.0)  10^3/uL


 


RBC  3.14 L    (3.80-5.50)  10^6/uL


 


Hgb  9.3 L    (12.0-16.0)  g/dL


 


Hct  27.0 L    (37.0-47.0)  %


 


MCV  86.1    (82.0-92.0)  fL


 


MCH  29.7    (27.0-31.0)  pg


 


MCHC  34.5    (32.0-36.0)  g/dL


 


RDW  16.7 H    (11.5-14.5)  %


 


Plt Count  223    (150-300)  10^3/uL


 


MPV  8.8    (7.4-10.4)  fL


 


Neut % (Auto)  84.7 H    (50.0-70.0)  %


 


Lymph % (Auto)  8.4 L    (20.0-40.0)  %


 


Mono % (Auto)  1.2 L    (2.0-8.0)  %


 


Eos % (Auto)  5.4 H    (1.0-3.0)  %


 


Baso % (Auto)  0.3    (0.0-1.0)  %


 


Neut # (Auto)  4.7    (2.5-7.0)  10^3/uL


 


Lymph # (Auto)  0.5 L    (1.0-4.0)  10^3/uL


 


Mono # (Auto)  0.1    (0.1-0.8)  10^3/uL


 


Eos # (Auto)  0.3    (0.1-0.3)  10^3/uL


 


Baso # (Auto)  0.0    (0.0-0.1)  10^3/uL


 


PT    33.1 H  (8.9-11.4)  SEC


 


INR    3.1 H  (0.9-1.1)  


 


APTT    37.4 H  (20.8-31.2)  SEC


 


Sodium   137   (136-145)  mmol/L


 


Potassium   4.6   (3.3-5.3)  mmol/L


 


Chloride   102   ()  mmol/L


 


Carbon Dioxide   26.9   (21.0-32.0)  mmol/L


 


BUN   26 H   (6-25)  mg/dL


 


Creatinine   0.79   (0.51-1.17)  mg/dL


 


Est Cr Clr Drug Dosing   44.03   mL/min


 


Estimated GFR (MDRD)   > 60   mL/min


 


Glucose   144 H   ()  mg/dL


 


Calcium   9.1   (8.7-10.3)  mg/dL


 


Total Bilirubin   1.8 H   (0.2-1.0)  mg/dL


 


AST   76 H   (15-37)  U/L


 


ALT   160 H   (12-78)  U/L


 


Alkaline Phosphatase   318 H   ()  IU/L


 


Creatine Kinase   13 L   ()  U/L


 


CK-MB (CK-2)   < 0.50   (0.00-4.30)  ng/mL


 


Troponin I   0.05   (0.00-0.070)  ng/mL


 


Total Protein   6.9   (6.4-8.2)  g/dL


 


Albumin   2.93 L   (3.00-4.80)  g/dL


 


Lipase   113   ()  U/L


 


Specimen Type     


 


Urine Color     (YELLOW)  


 


Urine Appearance     (CLEAR)  


 


Urine pH     (5.0-9.0)  


 


Ur Specific Gravity     (1.005-1.030)  


 


Urine Protein     (NEGATIVE)  mg/dL


 


Urine Glucose (UA)     (NEGATIVE)  mg/dL


 


Urine Ketones     (NEGATIVE)  mg/dL


 


Urine Occult Blood     (NEGATIVE)  


 


Urine Nitrite     (NEGATIVE)  


 


Urine Bilirubin     (NEGATIVE)  


 


Urine Urobilinogen     (0.2-1.0)  E.U./dL


 


Ur Leukocyte Esterase     (NEGATIVE)  


 


Urine RBC     /HPF


 


Urine WBC     /HPF


 


Ur Epithelial Cells     /LPF


 


Urine Bacteria     (NONE TO FEW)  /HPF














  07/20/17 Range/Units





  18:25 


 


WBC   (5.0-10.0)  10^3/uL


 


RBC   (3.80-5.50)  10^6/uL


 


Hgb   (12.0-16.0)  g/dL


 


Hct   (37.0-47.0)  %


 


MCV   (82.0-92.0)  fL


 


MCH   (27.0-31.0)  pg


 


MCHC   (32.0-36.0)  g/dL


 


RDW   (11.5-14.5)  %


 


Plt Count   (150-300)  10^3/uL


 


MPV   (7.4-10.4)  fL


 


Neut % (Auto)   (50.0-70.0)  %


 


Lymph % (Auto)   (20.0-40.0)  %


 


Mono % (Auto)   (2.0-8.0)  %


 


Eos % (Auto)   (1.0-3.0)  %


 


Baso % (Auto)   (0.0-1.0)  %


 


Neut # (Auto)   (2.5-7.0)  10^3/uL


 


Lymph # (Auto)   (1.0-4.0)  10^3/uL


 


Mono # (Auto)   (0.1-0.8)  10^3/uL


 


Eos # (Auto)   (0.1-0.3)  10^3/uL


 


Baso # (Auto)   (0.0-0.1)  10^3/uL


 


PT   (8.9-11.4)  SEC


 


INR   (0.9-1.1)  


 


APTT   (20.8-31.2)  SEC


 


Sodium   (136-145)  mmol/L


 


Potassium   (3.3-5.3)  mmol/L


 


Chloride   ()  mmol/L


 


Carbon Dioxide   (21.0-32.0)  mmol/L


 


BUN   (6-25)  mg/dL


 


Creatinine   (0.51-1.17)  mg/dL


 


Est Cr Clr Drug Dosing   mL/min


 


Estimated GFR (MDRD)   mL/min


 


Glucose   ()  mg/dL


 


Calcium   (8.7-10.3)  mg/dL


 


Total Bilirubin   (0.2-1.0)  mg/dL


 


AST   (15-37)  U/L


 


ALT   (12-78)  U/L


 


Alkaline Phosphatase   ()  IU/L


 


Creatine Kinase   ()  U/L


 


CK-MB (CK-2)   (0.00-4.30)  ng/mL


 


Troponin I   (0.00-0.070)  ng/mL


 


Total Protein   (6.4-8.2)  g/dL


 


Albumin   (3.00-4.80)  g/dL


 


Lipase   ()  U/L


 


Specimen Type  Urincc  


 


Urine Color  Yellow  (YELLOW)  


 


Urine Appearance  Clear  (CLEAR)  


 


Urine pH  6.0  (5.0-9.0)  


 


Ur Specific Gravity  1.015  (1.005-1.030)  


 


Urine Protein  30 H  (NEGATIVE)  mg/dL


 


Urine Glucose (UA)  Negative  (NEGATIVE)  mg/dL


 


Urine Ketones  Negative  (NEGATIVE)  mg/dL


 


Urine Occult Blood  Trace-intact H  (NEGATIVE)  


 


Urine Nitrite  Negative  (NEGATIVE)  


 


Urine Bilirubin  Negative  (NEGATIVE)  


 


Urine Urobilinogen  0.2  (0.2-1.0)  E.U./dL


 


Ur Leukocyte Esterase  Negative  (NEGATIVE)  


 


Urine RBC  0-5  /HPF


 


Urine WBC  0-5  /HPF


 


Ur Epithelial Cells  Few  /LPF


 


Urine Bacteria  Few  (NONE TO FEW)  /HPF











Meds: 


Medications











Generic Name Dose Route Start Last Admin





  Trade Name Haider  PRN Reason Stop Dose Admin


 


Acetaminophen  650 mg  07/20/17 19:55  





  Tylenol  PO   





  Q4H PRN   





  Pain (Mild 1-3)/fever   


 


Levofloxacin/Dextrose 500 mg/  100 mls @ 100 mls/hr  07/20/17 20:00  07/20/17 20

:11





  Premix  IV   100 mls/hr





  Q24H STEFANO   Administration


 


Sodium Chloride  1,000 mls @ 75 mls/hr  07/20/17 20:00  07/20/17 20:11





  Normal Saline  IV   75 mls/hr





  ASDIRECTED STEFANO   Administration


 


Ondansetron HCl  4 mg  07/20/17 19:55  





  Zofran  IV   





  Q6H PRN   





  Nausea/Vomiting   


 


Sodium Chloride  5 ml  07/20/17 19:55  





  Syrex Flush  FLUSH   





  Q8HR PRN   





  Keep Vein Open   














Discontinued Medications














Generic Name Dose Route Start Last Admin





  Trade Name Haider  PRN Reason Stop Dose Admin


 


Acetaminophen  1,000 mg  07/20/17 18:13  07/20/17 18:38





  Tylenol Extra Strength  PO  07/20/17 18:14  1,000 mg





  ONETIME ONE   Administration


 


Sodium Chloride  1,000 mls @ 999 mls/hr  07/20/17 17:52  07/20/17 18:10





  Normal Saline  IV  07/20/17 18:52  999 mls/hr





  .BOLUS ONE   Administration


 


Sodium Chloride  1,000 mls @ 999 mls/hr  07/20/17 17:53  





  Normal Saline  IV  07/20/17 18:53  





  .BOLUS ONE   


 


Ondansetron HCl  4 mg  07/20/17 17:53  07/20/17 18:09





  Zofran  IVPUSH  07/20/17 17:54  4 mg





  ONETIME ONE   Administration


 


Sodium Chloride  5 ml  07/20/17 17:53  





  Syrex Flush  FLUSH   





  Q8HR PRN   





  Keep Vein Open   














- Radiology Interpretation


Free Text/Narrative:: 





CXR- NAD





Departure





- Departure


Time of Disposition: 20:42


Disposition: Admitted As Inpatient 66


Condition: Good


Clinical Impression: 


Urinary tract infection


Qualifiers:


 Urinary tract infection type: acute cystitis Hematuria presence: without 

hematuria Qualified Code(s): N30.00 - Acute cystitis without hematuria





Fever


Qualifiers:


 Fever type: unspecified Qualified Code(s): R50.9 - Fever, unspecified








- Discharge Information





- My Orders


Last 24 Hours: 


My Active Orders





07/20/17 17:53


Chest 1V Frontal [CR] Stat 





07/20/17 17:54


EKG Documentation Completion [RC] ASDIRECTED 


Peripheral IV Care [RC] .AS DIRECTED 





07/20/17 17:55


CULTURE BLOOD [BC] Stat 





07/20/17 18:30


CULTURE BLOOD [BC] Stat 














- Assessment/Plan


Last 24 Hours: 


My Active Orders





07/20/17 17:53


Chest 1V Frontal [CR] Stat 





07/20/17 17:54


EKG Documentation Completion [RC] ASDIRECTED 


Peripheral IV Care [RC] .AS DIRECTED 





07/20/17 17:55


CULTURE BLOOD [BC] Stat 





07/20/17 18:30


CULTURE BLOOD [BC] Stat 











Assessment:: 





1. vomiting


2. urinary tract infection


3. malaise

## 2017-07-21 LAB
CHLORIDE SERPL-SCNC: 103 MMOL/L (ref 98–115)
SODIUM SERPL-SCNC: 135 MMOL/L (ref 136–145)

## 2017-07-21 PROCEDURE — 30233N1 TRANSFUSION OF NONAUTOLOGOUS RED BLOOD CELLS INTO PERIPHERAL VEIN, PERCUTANEOUS APPROACH: ICD-10-PCS | Performed by: NURSE PRACTITIONER

## 2017-07-21 RX ADMIN — HYDROCODONE BITARTRATE AND ACETAMINOPHEN PRN TAB: 10; 325 TABLET ORAL at 22:09

## 2017-07-21 RX ADMIN — CARVEDILOL SCH EACH: 12.5 TABLET, FILM COATED ORAL at 09:33

## 2017-07-21 RX ADMIN — Medication SCH TAB: at 11:44

## 2017-07-21 RX ADMIN — Medication SCH TAB: at 18:15

## 2017-07-21 RX ADMIN — VITAMIN D, TAB 1000IU (100/BT) SCH UNITS: 25 TAB at 09:34

## 2017-07-21 RX ADMIN — OMEPRAZOLE SCH MG: 20 CAPSULE, DELAYED RELEASE ORAL at 09:34

## 2017-07-21 RX ADMIN — Medication SCH MG: at 11:41

## 2017-07-21 RX ADMIN — CYANOCOBALAMIN TAB 500 MCG SCH MCG: 500 TAB at 09:33

## 2017-07-21 RX ADMIN — LEVOFLOXACIN SCH MLS/HR: 500 INJECTION, SOLUTION INTRAVENOUS at 20:43

## 2017-07-21 NOTE — HP
CHIEF COMPLAINT:  Malaise, nausea, vomiting, fever.

 

HISTORY OF PRESENT ILLNESS:  The patient was sitting outside in the sun for

about an hour.  When she came back inside, she started feeling sick and she was

nauseated, she ended up throwing up.  She felt warm.  She just was not feeling

well.  She was brought to the emergency room for further evaluation and

treatment at that time.  Urinalysis was unremarkable.  Chest x-ray did show a

possible right basilar infiltrate.  The patient was given IV fluids in the

emergency room and admitted at that time.

 

PAST MEDICAL HISTORY:  The patient does have a history of MDS, CHF,

hypothyroidism, hypertension, insomnia, paroxysmal atrial fibrillation.

 

MEDICATIONS:  The patient takes Tylenol as needed.  She has DuoNeb as needed,

aspirin 81 mg daily.  She takes a calcium citrate with vitamin D daily, vitamin

D3 tablet daily.  She takes vitamin B12 of 500 mcg Monday through Friday.  She

also has Lomotil as needed for diarrhea.  She takes Lasix 40 mg every other day.

She takes some hydrocodone as needed.  She also takes probiotics one capsule

daily.  She takes levothyroxine 125 mcg daily.  She takes 3 mg melatonin at

bedtime for sleep.  She takes metoprolol tartrate 12.5 mg daily.  She takes

Remeron 15 mg at bedtime.  She takes omeprazole 20 mg daily prior to breakfast,

Zofran as needed, vitamin K 100 mcg daily, potassium chloride 10 mEq every other

day, prednisone 4 mg daily, vitamin B6 of 100 mg daily, and Coumadin dose

depending on INR Clinic.

 

ALLERGIES:  Erythromycin, penicillin, sulfa, and Revlimid.

 

SOCIAL/PERSONAL HISTORY:  The patient does currently live at the nursing home

here in [place].  She is .  She does not smoke tobacco products or drink

alcohol beverages.

 

REVIEW OF SYSTEMS:

CONSTITUTIONAL:  The patient complains of weakness, fatigue, poor appetite,

fever.

EYES:  No recent visual changes.

ENT:  No sinus congestion or hoarseness.

CARDIOVASCULAR:  No chest pain or palpitations.

RESPIRATORY:  No cough. No shortness of breath.

GI:  No vomiting, diarrhea or melena.

:  No dysuria or hematuria.

MUSCULOSKELETAL:  No new bone pain or joint swelling.

INTEGUMENTARY:  No rash or pruritus.

NEUROLOGIC/PSYCHIATRIC:  No recent headache or focal weakness. No depressive

symptoms.

ENDOCRINE:  No heat or cold intolerances or polydipsia.

HEMATOLOGIC/LYMPHATIC:  No excessive bruising or lymph node swelling.

ALLERGIC/IMMUNOLOGIC:  No hives or recurrent infections.

 

PHYSICAL EXAMINATION:

GENERAL:  This is an elderly white female, in no acute distress.  She does look

pale in appearance.

VITAL SIGNS:  Stable.  Temperature is 99.0, pulse is 85, blood pressure is

98/44, respiratory rate 24, oxygen saturation on 2 L nasal cannula is 95%.

HEENT:  Head is normocephalic.  EOMs are intact.  Pupils are equal, round,

reactive to light and accommodation.  Bilateral tympanic membranes are intact.

Nose is clear.  No pharyngeal erythema.

NECK:  Supple.  No JVD.  Trachea midline.

LUNGS:  Clear on the left.  Diminished at the left base.  She does have fine

crackles to the right mid lung down to the base of the right lung.

CARDIAC:  The patient has a loud murmur.

ABDOMEN:  Soft, nontender, nondistended.  Bowel sounds present x4.

EXTREMITIES:  The patient is somewhat stiff with range of motion, but she does

have limited range of motion all four extremities.  No joint effusions noted.

NEUROLOGIC:  Grossly intact.  She is forgetful someone at time.

 

DIAGNOSTIC:  Lab work that was obtained in the emergency room.  CBC showed white

count of 5.6, hemoglobin 9.3.  The patient's PT was 33.1, INR 3.1, PTT 37.4.

Chemistry panel showed BUN slightly elevated at 26.  AST 76, , alkaline

phosphatase 318.  The patient's troponin was negative.  Albumin was low at 2.93.

Lipase within normal range at 113.  Total protein within normal range at 6.9.

CK-MB was also negative.  Urine showed few bacteria, trace of blood, some

protein, otherwise unremarkable.  The patient's chest x-ray report reads

impression:  Bibasilar atelectasis and possible right base infiltrate.

 

IMPRESSION/PLAN:

1. Possible right lower lobe infiltrate with bibasilar atelectasis.  Plan:  We

    are going to have the patient on IV antibiotic therapy of Levaquin 500 mg

    IV daily along with DuoNeb every 6 hours.  Oxygen as needed.

2. 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  Plan:  The patient's

    hemoglobin today on repeat lab work shows hemoglobin low at 7.2.  We then

    transfuse the patient 1 unit of packed RBCs today.  Repeat a CBC in the

    morning.

3. Nausea and vomiting.  Plan:  This is improved.  She does have Zofran IV

    ordered as needed.  The patient did receive IV fluids 2 L boluses of normal

    saline in the emergency room.

4. Elevated LFTs.  The patient's liver function tests were elevated.  We are

    going to repeat those labs today.  The patient's AST was 76, , and

    alkaline phosphatase was 318.

 

CHRONIC CONDITIONS:

1. MDS.  Plan:  Continue with prednisone 4 mg daily.  The patient was also

    taken Jadenu 500 mg daily at home.  We will also continue daily.

2. Congestive heart failure.  Plan:  Continue with Lasix 40 mg orally every

    other day.  We will give the patient Lasix 20 mg IV after her unit of blood

    today.  The patient's troponin was negative along with her CK-MB was also

    negative.

3. Hypothyroidism.  Plan, continue with levothyroxine 100 mcg daily.

4. Hypertension.  Plan:  Continue with metoprolol tartrate 12.5 mg daily.

5. Insomnia.  Plan:  Continue with melatonin 3 mg at bedtime along with

    Remeron 15 mg at bedtime.

6. Paroxysmal atrial fibrillation.  Plan:  Continue with Coumadin as ordered.

    The patient's INR today is 2.2.

7. Scabbed wound to left lower extremity.  Plan:  Wound appears to be healing

    well.  We will leave it open to air.

 

DD:  07/21/2017 09:42:32

DT:  07/21/2017 14:28:09

Job #:  [id number]

## 2017-07-22 LAB
CHLORIDE SERPL-SCNC: 103 MMOL/L (ref 98–115)
SODIUM SERPL-SCNC: 136 MMOL/L (ref 136–145)

## 2017-07-22 RX ADMIN — OMEPRAZOLE SCH MG: 20 CAPSULE, DELAYED RELEASE ORAL at 06:17

## 2017-07-22 RX ADMIN — HYDROCODONE BITARTRATE AND ACETAMINOPHEN PRN TAB: 10; 325 TABLET ORAL at 06:30

## 2017-07-22 RX ADMIN — VITAMIN D, TAB 1000IU (100/BT) SCH UNITS: 25 TAB at 09:47

## 2017-07-22 RX ADMIN — Medication SCH MG: at 09:47

## 2017-07-22 RX ADMIN — Medication SCH TAB: at 13:12

## 2017-07-22 RX ADMIN — CARVEDILOL SCH EACH: 12.5 TABLET, FILM COATED ORAL at 09:52

## 2017-07-22 RX ADMIN — POTASSIUM CHLORIDE SCH MEQ: 750 TABLET, FILM COATED, EXTENDED RELEASE ORAL at 09:46

## 2017-07-22 RX ADMIN — LEVOFLOXACIN SCH MLS/HR: 500 INJECTION, SOLUTION INTRAVENOUS at 19:59

## 2017-07-22 RX ADMIN — Medication SCH TAB: at 17:30

## 2017-07-22 RX ADMIN — HYDROCODONE BITARTRATE AND ACETAMINOPHEN PRN TAB: 10; 325 TABLET ORAL at 22:25

## 2017-07-22 NOTE — PN
07/22/2017 PATIENT NAME:  RAJANI ARAIZA

 

SUBJECTIVE:  This is an 82-year-old female patient who has a long history of

MDS, who was at home, she was sitting outside for about an hour in the sun and

the heat and the humidity, and she went back inside, she just was not feeling

well.  She felt like she had a fever.  She just felt nauseated and that she did

have a small emesis.  She was brought to the emergency room for further

evaluation and treatment.  At that time, the patient was found to be febrile.

She had a fever of 101.7, so she was admitted at that time.  Her chest x-ray did

show possible right lower lobe infiltrate.  Today, she states that she is

feeling much better.  She denies any shortness of breath or cough.  She says she

is slowly getting a little appetite back.  She is able the eat.  She states she

has not thrown up since she has been in the hospital.  She is up with standby

assist, walking with a walker in the room.  The patient does appear little bit

to be slightly jaundiced on appearance.

 

OBJECTIVE:  VITAL SIGNS:  Today, temperature is 98.6, pulse is 67, blood

pressure is 133/72, respiratory rate is 18, oxygen saturations on 2 L nasal

cannula is 95%.

GENERAL:  This is an elderly white female, in no acute distress.  She does

appear slightly jaundiced on appearance.

HEART:  Heart tones are distant.  Slightly irregular pulse at times.

LUNGS:  Sounds are clear in the upper lobes, diminished at bilateral bases.

Right lower lobe is more diminished than the left, and she does have some

rhonchi to her right lower lobe.

ABDOMEN:  Soft, nontender, nondistended.  Bowel sounds present x4.

EXTREMITIES:  No pedal edema noted on exam.

 

LABORATORY DATA:  The patient's lab work that was obtained today.  CBC shows a

white count within normal range at 5.0, hemoglobin is up today to 8.7, platelet

count is 159.  The patient's INR today is 2.0.  The patient's chemistry panel is

unremarkable except for total bilirubin that is elevated at 3.2.  Liver function

tests were elevated.  Her AST is 59, ALT is 125, and alkaline phosphatase is

242.  The patient's total protein is low at 5.6 and albumin is low at 2.45.

 

IMPRESSION AND PLAN:

1. Right lower lobe infiltrate with bibasilar atelectasis, per Radiology.

    Plan:  We are going to continue the patient on Levaquin 500 mg IV daily.

    She has been afebrile since she was seen originally in the emergency room.

    We will continue with DuoNebs every 6 hours.  Oxygen as needed.  The

    patient's white count today is 5.0.

2. 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  Plan:  The patient's

    hemoglobin yesterday was low at 7.2.  We did transfuse her with 1 unit of

    packed RBCs.  Today, her hemoglobin is at 8.7.  She seems to be feeling

    better.  We will repeat a CBC in the morning.

3. Nausea and vomiting.  Plan:  The patient, she does have Zofran ordered.

    She has had no further nausea or vomiting since she has been admitted.  She

    has some Zofran IV ordered as needed.

4. Elevated liver function tests along with elevated bilirubin.  Plan:  The

    patient's liver function tests have been elevated.  They are slightly

    improving.  She is slightly jaundiced with an elevated bilirubin.  We are

    going to obtain an abdominal and pelvic CAT scan today with contrast for

    further evaluation and treatment of the patient's elevated liver function

    tests.

 

CHRONIC CONDITIONS:

1. Myelodysplastic syndrome.  Plan:  We will continue with prednisone.  We

    will decrease it today from 4 mg to 3 mg daily, to see if this helps with

    her liver function tests.  We are still holding the patient's Jadenu at

    this time.

2. Congestive heart failure.  Plan:  Continue with Lasix 40 mg orally every

    other day.  The patient's troponins have come back negative.  CK-MB was

    also negative.

3. Hypothyroidism.  Plan:  Continue with levothyroxine 100 mcg daily.

4. Hypertension.  Plan: Continue with metoprolol tartrate 12.5 mg daily.

5. Insomnia.  Plan: Continue with melatonin 3 mg at bedtime along with Remeron

    15 mg at bedtime.

6. Paroxysmal atrial fibrillation.  Plan: Continue with Coumadin as ordered.

    The patient's INR today was 2.3.

7. Scab wound to left lower extremity.  Plan: Wounds appears to be healing

    well.  It is scabbed over.  We will leave the wound open to air.

 

DD:  07/22/2017 11:22:52

DT:  07/22/2017 12:19:08

Job #:  [id number]

MTDD

## 2017-07-23 LAB
CHLORIDE SERPL-SCNC: 101 MMOL/L (ref 98–115)
SODIUM SERPL-SCNC: 137 MMOL/L (ref 136–145)

## 2017-07-23 RX ADMIN — VITAMIN D, TAB 1000IU (100/BT) SCH UNITS: 25 TAB at 08:50

## 2017-07-23 RX ADMIN — LEVOFLOXACIN SCH MLS/HR: 500 INJECTION, SOLUTION INTRAVENOUS at 19:46

## 2017-07-23 RX ADMIN — Medication SCH MG: at 08:48

## 2017-07-23 RX ADMIN — OMEPRAZOLE SCH MG: 20 CAPSULE, DELAYED RELEASE ORAL at 06:14

## 2017-07-23 RX ADMIN — HYDROCODONE BITARTRATE AND ACETAMINOPHEN PRN TAB: 10; 325 TABLET ORAL at 14:22

## 2017-07-23 RX ADMIN — CARVEDILOL SCH EACH: 12.5 TABLET, FILM COATED ORAL at 08:51

## 2017-07-23 RX ADMIN — Medication SCH TAB: at 17:36

## 2017-07-23 RX ADMIN — Medication SCH TAB: at 11:49

## 2017-07-23 NOTE — PN
07/23/2017 PATIENT NAME:  RAJANI ARAIZA

 

SUBJECTIVE:  This is an 82-year-old female patient with a long history of MDS,

who was brought to the emergency room with concerns about fever, nausea,

vomiting, just not feeling well.  X-ray revealed that the patient did have a

right lower lobe infiltrate per Radiology.  The patient also had elevated liver

function tests.  Today now, she states that her appetite is a little better.

She is eating her breakfast.  She denies any nausea, vomiting, or abdominal

pain.  She denies any cough.  She states that she is a little tired today.

 

OBJECTIVE:  VITAL SIGNS:  Today, temperature is 97.9, pulse is 70, blood

pressure is 133/74, respiratory rate is 18, oxygen saturations are 95% on 2 L

nasal cannula.

ABDOMEN:  Soft, nontender, and nondistended.  Bowel sounds present x4.

HEART:  Tones are regular rate and rhythm.  No murmurs identified.

LUNGS:  Sounds are clear in the upper lobes and diminished at bilateral bases.

She does have some fine crackles with some rhonchi in her right lower base.

EXTREMITIES:  No pedal edema noted.

 

DIAGNOSTICS:  The patient's lab work that was obtained today, the patient's CBC

shows a white count at 4.5, hemoglobin 8.7, platelet count at 170.  The

patient's INR today is 2.1.  Chemistry panel is unremarkable, except for the

patient's total bilirubin is 3.2, AST is 58, ALT is 124, alk phos is 264,

albumin is low at 2.36, and total protein low at 6.0.

 

The patient had a CT of her abdomen and pelvis performed yesterday to further

evaluate the patient's elevated LFTs, the impression reads per Radiology:

Portal mesenteric venous hypertension.  Development of moderate splenomegaly

since last exam.  No discrete liver abnormality.  No liver lesions.  No

extrahepatic biliary obstruction.  Minimal periportal edema.  Bilateral

nephrolithiasis.  Bladder calculus.  Early aneurysmal dilation of abdominal

aorta.  Diffuse atheromatous calcifications.  This was read by Dr. Ovidio Mccoy.

 

IMPRESSION AND PLAN:

1. Right lower lobe infiltrate with bibasilar atelectasis per Radiology.

    Plan, we are going to continue the patient on Levaquin 500 mg IV daily.

    She had been afebrile since admission.  We will continue with DuoNeb every

    6 hours and oxygen as needed.

2. 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  Plan, the patient's

    hemoglobin today is 8.7, we are going to transfuse one more unit of packed

    RBCs.  It is recommended by Dr. Henry in Oncology to keep her

    hemoglobin greater than 9.  We will repeat a CBC in the morning.

3. Nausea and vomiting upon admission.  Plan, this has been resolved since

    admission.  She does have some Zofran ordered that she can take as needed.

4. 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

    Plan, the patient's CAT scan yesterday did show portal mesenteric venous

    hypertension.  The patient is slightly jaundiced on exam.  We will continue

    the patient on prednisone 3 mg daily to help with bilirubin.  Also, she is

    on beta-blocker, metoprolol tartrate 12.5 mg daily.  We will continue to

    hold patient's Jadenu at this time.

 

CHRONIC CONDITIONS:

1. Myelodysplastic syndrome.  Plan, we will continue with the patient's

    prednisone 3 mg daily.  We will continue to hold the Jadenu at this time.

    We are going to transfuse 1 unit of packed RBCs today.  The patient does

    have some splenomegaly noted on CT exam.

2. Congestive heart failure.  Plan, continue with Lasix 40 mg orally every

    other day.  The patient's troponins that were recently done along with her

    CK-MB were all negative.

3. Hypothyroidism.  Plan, continue with levothyroxine 100 mcg daily.

4. Hypertension.  Plan, continue with metoprolol tartrate 12.5 mg daily.

5. Insomnia.  Plan, continue with melatonin 3 mg at bedtime along with Remeron

    15 mg at bedtime.

6. Paroxysmal atrial fibrillation.  Plan, continue with Coumadin as ordered.

    The patient's INR was 2.3 today.

7. Scabbed wound to left lower extremity.  Plan, this seems to be healing

    well.  We will continue to monitor.  We will leave it open to air.

 

DD:  07/23/2017 12:25:41

DT:  07/23/2017 13:00:09

Job #:  [id number]

## 2017-07-24 LAB
CHLORIDE SERPL-SCNC: 105 MMOL/L (ref 98–115)
SODIUM SERPL-SCNC: 141 MMOL/L (ref 136–145)

## 2017-07-24 RX ADMIN — OMEPRAZOLE SCH MG: 20 CAPSULE, DELAYED RELEASE ORAL at 06:03

## 2017-07-24 RX ADMIN — CARVEDILOL SCH EACH: 12.5 TABLET, FILM COATED ORAL at 08:03

## 2017-07-24 RX ADMIN — VITAMIN D, TAB 1000IU (100/BT) SCH UNITS: 25 TAB at 08:02

## 2017-07-24 RX ADMIN — HYDROCODONE BITARTRATE AND ACETAMINOPHEN PRN TAB: 10; 325 TABLET ORAL at 02:05

## 2017-07-24 RX ADMIN — HYDROCODONE BITARTRATE AND ACETAMINOPHEN PRN TAB: 10; 325 TABLET ORAL at 17:00

## 2017-07-24 RX ADMIN — POTASSIUM CHLORIDE SCH MEQ: 750 TABLET, FILM COATED, EXTENDED RELEASE ORAL at 08:02

## 2017-07-24 RX ADMIN — CYANOCOBALAMIN TAB 500 MCG SCH MCG: 500 TAB at 08:00

## 2017-07-24 RX ADMIN — Medication SCH TAB: at 17:02

## 2017-07-24 RX ADMIN — Medication SCH MG: at 08:02

## 2017-07-24 RX ADMIN — LEVOFLOXACIN SCH MLS/HR: 500 INJECTION, SOLUTION INTRAVENOUS at 20:46

## 2017-07-24 RX ADMIN — Medication SCH TAB: at 12:54

## 2017-07-24 NOTE — PN
07/24/2017 PATIENT NAME:  RAJANI ARAIZA

 

SUBJECTIVE:  This is an 82-year-old female patient who has a long history of

MDS.  She was at home, and she was sitting outside in the hot sun and humidity

and when she came back in and she just was not feeling well.  She was having a

fever.  She was nauseated and did have one emesis.  She was brought to the

emergency room for further evaluation and treatment.  At that time, a chest x-

ray revealed that the patient did have an infiltrate to her right lower lobe.

The patient was admitted at that time.  The patient today says she has no cough.

No shortness of breath.  She feels okay.  She says she is weak, but her

strength is improving every day.  She denies any nausea or vomiting or abdominal

pain.

 

OBJECTIVE:  VITAL SIGNS:  Today, temperature is 97.7, pulse is 69, blood

pressure is 125/72, respiratory rate is 20, oxygen saturation 95% on 2 L nasal

cannula.

GENERAL:  This is an elderly white female, in no acute distress.

HEART:  Tones are regular rate and rhythm.  No murmurs identified.

ABDOMEN:  Soft, nontender, nondistended.  Bowel sounds present x4.  LUNGS:

Sounds are clear throughout lung bases except for right lower lobe.  There is

still some rhonchi noted with deep inspiration.  No pedal edema noted.

 

LABORATORY DATA:  The patient's lab work that was obtained today.  The patient's

CBC shows a white count at 4.2, hemoglobin 9.3.  INR of 2.5.  Chemistry panel is

unremarkable except for her total bilirubin is elevated at 2.5, this improved

from yesterday when was 3.2.  The patient's AST is 40, ALT is 106, alkaline

phosphatase is 254.  The patient's total bilirubin is 6.0 and albumin is low at

2.34.  These are improved from yesterday.

 

IMPRESSION AND PLAN:

1. Right lower lobe infiltrate with bibasilar atelectasis per Radiology.

    Plan:  We are going to continue the patient on Levaquin 500 mg IV daily.

    We will continue with DuoNebs every 6 hours.  We will use oxygen as needed.

    We will schedule a repeat chest x-ray tomorrow morning to assess for

    resolution of right lower lobe infiltrate.

2. 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  Plan:  The patient's

    hemoglobin is stable today at 9.3.  She has required 2 units of packed RBCs

    to get the hemoglobin to this level.  It was Dr. Henry recommendations

    from Oncology to keep her hemoglobin greater than 9.  We will recheck a CBC

    in the morning.

3. Nausea, vomiting upon admission.  Plan:  The patient has had no emesis

    since admission.  She has been doing well.  No nausea.  We will continue

    with Zofran as needed.

4. 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

    Plan:  The patient's CT scan that we did on Saturday did show portal

    mesenteric venous hypertension.  The patient is slightly jaundiced on exam,

    but this is improving.  We will continue the patient on 3 mg of prednisone

    daily.  We will also continue the patient on beta-blocker, metoprolol

    tartrate 12.5 mg twice daily.  We will continue to hold patient's Jadenu.

    The patient's lab work today is slightly improved.  Her total bilirubin is

    down to 2.5 today, this has improved from 3.2 yesterday.  ALT is down to 40

    today, it was 58 yesterday.  ALT is down to 107 today, yesterday it was

    124, and alkaline phosphatase is staying stable at 254.

 

CHRONIC CONDITIONS:

1. Myelodysplastic syndrome.  Plan:  We will continue the patient on

    prednisone 3 mg daily.  We will continue to hold Jadenu at this time.  We

    will order an iron level today.  The patient was transfused 1 unit of

    packed RBCs yesterday.  The patient does have splenomegaly noted on recent

    CT exam.

2. Congestive heart failure.  Plan:  We will continue the patient on Lasix 40

    mg orally every other day.  The patient's troponins when she came in along

    with CK-MB were all negative.

3. Hypothyroidism.  Plan:  Continue with levothyroxine 100 mcg daily.

4. Hypertension.  Plan:  Continue with metoprolol tartrate 12.5 mg daily.

5. Insomnia.  Plan:  Continue with melatonin 3 mg at bedtime along with

    Remeron 50 mg at bedtime.

6. Paroxysmal atrial fibrillation.  Plan:  Continue with Coumadin as ordered.

    INR today is stable at 2.5.

7. Scabbed wound to the left lower extremity.  Plan:  This seems to be healing

    really well.  We will continue to monitor.  We will leave it open to air.

 

OVERALL PLAN:  The patient is doing well.  Hemoglobin stable at 9.3 after 2

units of packed RBCs were transfused.  The patient no longer has any fever.  No

cough.  Pneumonia seems to be improved with the Levaquin.  The patient does have

some portal mesenteric hypertension on her recent CT scan.  The patient's liver

function test and bilirubin seems to be declining down and improving daily.  We

will continue to monitor.

 

DD:  07/24/2017 10:00:14

DT:  07/24/2017 11:30:43

Job #:  [id number]

MTDD

## 2017-07-25 VITALS — SYSTOLIC BLOOD PRESSURE: 137 MMHG | DIASTOLIC BLOOD PRESSURE: 84 MMHG

## 2017-07-25 LAB
CHLORIDE SERPL-SCNC: 102 MMOL/L (ref 98–115)
SODIUM SERPL-SCNC: 138 MMOL/L (ref 136–145)

## 2017-07-25 RX ADMIN — Medication SCH: at 11:18

## 2017-07-25 RX ADMIN — HYDROCODONE BITARTRATE AND ACETAMINOPHEN PRN TAB: 10; 325 TABLET ORAL at 00:46

## 2017-07-25 RX ADMIN — CYANOCOBALAMIN TAB 500 MCG SCH MCG: 500 TAB at 08:48

## 2017-07-25 RX ADMIN — Medication SCH MG: at 08:47

## 2017-07-25 RX ADMIN — OMEPRAZOLE SCH MG: 20 CAPSULE, DELAYED RELEASE ORAL at 06:12

## 2017-07-25 RX ADMIN — VITAMIN D, TAB 1000IU (100/BT) SCH UNITS: 25 TAB at 08:49

## 2017-07-26 NOTE — DISCH
FINAL DIAGNOSES:

1. Right lower lobe infiltrate with bibasilar atelectasis.  Atelectasis

    remains, however, right lower lobe infiltrate considerable clinical

    resolvement, also normal on chest x-ray.

2. 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  She received 2 units packed

    red blood cells.  Hemoglobin on discharge 10.1.

3. Nausea and vomiting.  This has resolved.

4. 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

    This has improved.  She will continue with prednisone along with blood

    pressure medicines.

5. Iron overload.  Iron is 183, range is 35 to 145, placed back on her Jadenu.

Other chronic medical conditions include myelodysplastic syndrome; congestive

heart failure, chronic; hypothyroidism; hypertension; insomnia; paroxysmal

atrial fibrillation.

 

HISTORY:  This 82-year-old female who has been in and out of hospital recently

and does have a long history of MDS requiring frequent blood transfusions due to

her anemia.  She is a resident of a long-term care center, which she was

recently placed in the spring.  Actually, she was at home at this time, and she

was sitting outside in the hot sun, humidity.  She came back in, and she was not

feeling well.  She did have a fever of 101+ when she came into the emergency

room.  Chest x-ray at that time did show an infiltrate to right lower lobe.  She

was started on antibiotics of Levaquin 500 mg, and she did not have any

shortness of breath at that time.

 

Patient's lab work on admission showed a white count of 5.6, hemoglobin 9.3, INR

3.1.  Chemistry panel demonstrated BUN slightly elevated 26, AST 76, ,

alkaline phosphatase 318.  Troponins were negative.  Low albumin at 2.9, lipase

normal at 113, CK-MB was normal.  UA showed few bacteria, trace of blood, some

protein.  Chest x-ray did report out bibasilar atelectasis with possible right

base infiltrate.  Again, she was started on Levaquin.  She did receive 2 units

of transfusion during this admission.

 

HOSPITAL COURSE:  Hospital course went well.  She was given 500 mg IV Levaquin

daily.  She did receive 2 units of packed red blood cells.  She will continue

with oxygen.  Her hemoglobin responded to 10.1 upon discharge.  She did have

some mild nausea and vomiting; however, this did improve with fluids and Zofran.

Due to her elevated LFTs, those were repeated.  They did trend those.  She was

continued with her steroids.  For her congestive heart failure, the Lasix 40 mg

every other day was given; however, she did receive an extra 20 mg after her

first unit of blood.

 

Other diagnostics:  CT of the abdomen and pelvis performed did show portal

mesenteric venous hypertension with development of moderate splenomegaly;

however, no discrete liver abnormality, no liver lesions, no extrahepatic

biliary obstruction.  She did have some minimal periportal edema with some

bilateral nephrosis with a bladder calculus.  She continued to improve in her

oxygenation and her cough.  A repeat chest x-ray did not demonstrate any jhonny

pneumonia.

 

LABORATORY DATA ON DISCHARGE:  White count 4.5, hematocrit 29.6, percent

neutrophils 50%.  INR slightly elevated at 2.7.  Sodium, potassium, BUN,

creatinine normal.  We were trending liver functions.  AST peaked at 59, it was

41 on discharge.  ALT peaked at 125, it was trending down at 98 on discharge.

Alkaline phosphatase peaked at 264, it was the same on discharge.  Albumin 2.5.

 

DISCHARGE MEDICATIONS:  Levaquin 750 mg p.o. daily x5 days (newly added).  The

patient can continue on all other home medications including her prednisone.

 

DISPOSITION:  The patient will be discharged back to [facility].  She

is to report any fever, any increase in weakness, or any increase in shortness

of breath or cough.

 

FOLLOW-UP RECOMMENDATIONS:  Repeating liver functions AST, ALT, and alkaline

phosphatase in 1 week.

 

64 minutes was spent on this discharge planning, process, and care coordination.

 

DD:  07/25/2017 10:51:05

DT:  07/25/2017 12:53:46

Job #:  [id number]

## 2018-03-13 ENCOUNTER — HOSPITAL ENCOUNTER (OUTPATIENT)
Dept: HOSPITAL 77 - KA.ED | Age: 83
Setting detail: OBSERVATION
LOS: 2 days | Discharge: NURSING, REHAB OR LONG-TERM CARE | End: 2018-03-15
Attending: FAMILY MEDICINE | Admitting: PHYSICIAN ASSISTANT
Payer: MEDICARE

## 2018-03-13 DIAGNOSIS — Z79.899: ICD-10-CM

## 2018-03-13 DIAGNOSIS — Z88.1: ICD-10-CM

## 2018-03-13 DIAGNOSIS — Z88.0: ICD-10-CM

## 2018-03-13 DIAGNOSIS — Z86.711: ICD-10-CM

## 2018-03-13 DIAGNOSIS — E78.00: ICD-10-CM

## 2018-03-13 DIAGNOSIS — Z79.01: ICD-10-CM

## 2018-03-13 DIAGNOSIS — Z79.82: ICD-10-CM

## 2018-03-13 DIAGNOSIS — F32.9: ICD-10-CM

## 2018-03-13 DIAGNOSIS — Z88.8: ICD-10-CM

## 2018-03-13 DIAGNOSIS — I11.0: ICD-10-CM

## 2018-03-13 DIAGNOSIS — D46.9: Primary | ICD-10-CM

## 2018-03-13 DIAGNOSIS — I25.10: ICD-10-CM

## 2018-03-13 DIAGNOSIS — I25.2: ICD-10-CM

## 2018-03-13 DIAGNOSIS — Z86.718: ICD-10-CM

## 2018-03-13 DIAGNOSIS — E03.9: ICD-10-CM

## 2018-03-13 DIAGNOSIS — I50.40: ICD-10-CM

## 2018-03-13 LAB
CHLORIDE SERPL-SCNC: 108 MMOL/L (ref 98–115)
SODIUM SERPL-SCNC: 143 MMOL/L (ref 136–145)

## 2018-03-13 NOTE — EDM.PDOC
ED HPI GENERAL MEDICAL PROBLEM





- General


Chief Complaint: General


Stated Complaint: LOW HEMOGLOBIN


Time Seen by Provider: 03/13/18 16:36


Source of Information: Reports: Patient, Family


History Limitations: Reports: No Limitations





- History of Present Illness


INITIAL COMMENTS - FREE TEXT/NARRATIVE: 





Patient is an 83-year-old female who presents to the emergency department this 

afternoon from the [facility] with a complaint of low hemoglobin 

and weakness.  Patient was found to have low level values this a.m.  According 

to the nursing home nurses patient was hypotensive and they decided to transfer 

her to the emergency department.  Patient has a chronic history of anemia and 

multiple transfusions.  Patient denies chest pain, shortness of breath, headache

, dizziness, nausea, vomiting, diarrhea, blood in stool or urine, or any recent 

falls.


Onset: Gradual


Severity: Mild


Improves with: Reports: None


Worsens with: Reports: None


Associated Symptoms: Reports: No Other Symptoms





- Related Data


 Allergies











Allergy/AdvReac Type Severity Reaction Status Date / Time


 


erythromycin base Allergy Severe Vomiting Verified 03/13/18 16:56





[Erythromycin Base]     


 


Penicillins Allergy Unknown Hives Verified 03/13/18 16:56


 


Sulfa (Sulfonamide Allergy  Other Verified 03/13/18 16:56





Antibiotics)     


 


sulfamethoxazole Allergy  Other Verified 03/13/18 16:56





[From Bactrim]     


 


trimethoprim [From Bactrim] Allergy  Other Verified 03/13/18 16:56


 


lenalidomide [From Revlimid] AdvReac Mild Rash Verified 03/13/18 16:56











Home Meds: 


 Home Meds





Cholecalciferol (Vitamin D3) [Vitamin D3] 2,000 unit PO DAILY 12/27/13 [History]


Cyanocobalamin (Vitamin B-12) [B-12 Dots] 500 mcg PO MOTUWETHFR@0800 12/27/13 [

History]


Calcium Carbonate/Vitamin D3 [Calcium 600-Vit D3 400 Tablet] 1 tab PO BID@1200,

1800 12/01/15 [History]


Mirtazapine [Remeron] 15 mg PO BEDTIME 04/29/16 [History]


Phytonadione [Vitamin K] 100 mcg PO DAILY 04/29/16 [History]


Pyridoxine HCl [Vitamin B-6] 100 mg PO DAILY 04/29/16 [History]


Aspirin [Halfprin] 81 mg PO DAILY #90 tab.ec 05/05/17 [Rx]


Lactobacillus Acidophilus [Acidophilus Lactobacillus] 1 cap PO DAILY 05/09/17 [

History]


Levothyroxine Sodium [Synthroid] 125 mcg PO ACBREAKFAST 05/09/17 [History]


Melatonin/Pyridoxine HCl (B6) [Melatonin 3 mg Tablet] 3 mg PO BEDTIME PRN 05/09/ 17 [History]


Furosemide [Lasix] 40 mg PO Q2D #0 05/12/17 [Rx]


Potassium Chloride [Klor-Con M20] 10 meq PO Q2D #0 05/12/17 [Rx]


Deferasirox [Jadenu] 1,000 mg PO DAILY@0600 06/04/17 [History]


Pantoprazole Sodium [Protonix] 20 mg PO ACBREAKFAST 06/04/17 [History]


Atropine/Diphenoxylate [Lomotil 0.025-2.5 MG] 1 tab PO TID PRN 06/22/17 [History

]


Metoprolol Tartrate [Lopressor] 12.5 mg PO DAILY 06/22/17 [History]


Warfarin [Coumadin] 7.5 mg PO MO@1800 06/22/17 [History]


Polyvinyl Alcohol [Artificial Tears] 1 drop EYEBOTH TID PRN 07/21/17 [History]


Warfarin [Coumadin] 5 mg PO SUTUWETHFRSA@1800 07/21/17 [History]


predniSONE [Prednisone] 3 mg PO DAILY@1800 07/21/17 [History]


prednisoLONE Acetate [Pred Forte 1% Ophth Susp] 1 drop EYEBOTH DAILY@0600 07/21/ 17 [History]


Allopurinol [Zyloprim] 100 mg PO DAILY 11/21/17 [History]


Denosumab [Prolia] 60 mg SUBCUT ASDIRECTED 11/21/17 [History]


oxyCODONE 5 mg PO BID 11/21/17 [History]











Past Medical History


HEENT History: Reports: Cataract, Hard of Hearing, Impaired Vision, Sinusitis


Cardiovascular History: Reports: Blood Clots/VTE/DVT, High Cholesterol, PVD, 

SOB on Exertion


Respiratory History: Reports: Bronchitis, Recurrent, PE, Pneumonia, Recurrent, 

SOB


Other Respiratory History: Chronic use of oxygen


Gastrointestinal History: Reports: Bowel Obstruction, Chronic Diarrhea, Fecal 

Incontinence, GERD


Genitourinary History: Reports: UTI, Recurrent


OB/GYN History: Reports: Endometriosis, Pregnancy


Musculoskeletal History: Reports: Back Pain, Chronic, Fibromyalgia, Other (See 

Below)


Other Musculoskeletal History: POLYMYALGIA


Neurological History: Reports: Vertigo


Psychiatric History: Reports: None


Endocrine/Metabolic History: Reports: Hypothyroidism, Osteopenia, Osteoporosis


Hematologic History: Reports: Anemia, Blood Transfusion(s), Other (See Below)


Other Hematologic History: Myelodysplastic Syndrome


Immunologic History: Reports: None


Oncologic (Cancer) History: Reports: None


Dermatologic History: Reports: None





- Infectious Disease History


Infectious Disease History: Reports: Other (See Below)


Other Infectious Disease History: Unable to assess.





- Past Surgical History


Head Surgeries/Procedures: Reports: None


HEENT Surgical History: Reports: Adenoidectomy, Cataract Surgery, Tonsillectomy


GI Surgical History: Reports: Appendectomy, Cholecystectomy, Colonoscopy, Other 

(See Below)


Endocrine Surgical History: Reports: None


Neurological Surgical History: Reports: None


Oncologic Surgical History: Reports: None





Social & Family History





- Family History


Family Medical History: Noncontributory


HEENT: Reports: None


Cardiac: Reports: None


Respiratory: Reports: None


GI: Reports: None


: Reports: Other (See Below)


OBGYN: Reports: None


Musculoskeletal: Reports: None


Neurological: Reports: None


Psychiatric: Reports: None


Endocrine/Metabolic: Reports: None


Hematologic: Reports: None


Immunologic: Reports: None


Dermatologic: Reports: None


Oncologic: Reports: Hodgkin's Lymphoma





- Tobacco Use


Smoking Status *Q: Never Smoker


Second Hand Smoke Exposure: No





- Caffeine Use


Caffeine Use: Reports: Coffee





- Alcohol Use


Days Per Week of Alcohol Use: 0





- Recreational Drug Use


Recreational Drug Use: No





ED ROS GENERAL





- Review of Systems


Review Of Systems: ROS reveals no pertinent complaints other than HPI.


Constitutional: Reports: Weakness


HEENT: Reports: No Symptoms


Respiratory: Reports: No Symptoms


Cardiovascular: Reports: No Symptoms


Endocrine: Reports: No Symptoms


GI/Abdominal: Reports: No Symptoms


: Reports: No Symptoms


Musculoskeletal: Reports: No Symptoms


Skin: Reports: No Symptoms


Neurological: Reports: No Symptoms


Psychiatric: Reports: No Symptoms


Hematologic/Lymphatic: Reports: No Symptoms, Anemia


Immunologic: Reports: No Symptoms





ED EXAM, GENERAL





- Physical Exam


Exam: See Below


Exam Limited By: No Limitations


General Appearance: Alert, WD/WN, No Apparent Distress


Eye Exam: Bilateral Eye: Normal Inspection


Nose: Normal Inspection, Normal Mucosa, No Blood


Throat/Mouth: Normal Inspection, Normal Oropharynx, No Airway Compromise


Head: Atraumatic, Normocephalic


Neck: Normal Inspection


Respiratory/Chest: No Respiratory Distress, Lungs Clear, Normal Breath Sounds, 

No Accessory Muscle Use, Chest Non-Tender


Cardiovascular: Regular Rate, Rhythm, Systolic Murmur


GI/Abdominal: Normal Bowel Sounds, Soft, Non-Tender, No Organomegaly, No 

Distention, No Abnormal Bruit, No Mass


Back Exam: Normal Inspection.  No: CVA Tenderness (L), CVA Tenderness (R)


Extremities: Normal Inspection, No Pedal Edema


Neurological: Alert, Oriented, Normal Cognition


Psychiatric: Normal Affect, Normal Mood


Skin Exam: Warm, Dry, Intact, Normal Color, No Rash


Lymphatic: No Adenopathy





Course





- Vital Signs


Last Recorded V/S: 





 Last Vital Signs











Temp  98.6 F   03/13/18 16:57


 


Pulse  68   03/13/18 16:57


 


Resp  18   03/13/18 16:57


 


BP  121/49 L  03/13/18 16:57


 


Pulse Ox  86 L  03/13/18 16:57














- Orders/Labs/Meds


Orders: 





 Active Orders 24 hr











 Category Date Time Status


 


 CBC WITH AUTO DIFF [HEME] Stat Lab  03/13/18 17:02 Ordered


 


 COMPREHENSIVE METABOLIC PN,CMP [CHEM] Stat Lab  03/13/18 17:02 Ordered














- Re-Assessments/Exams


Free Text/Narrative Re-Assessment/Exam: 





03/13/18 17:42


Patient afebrile, nontoxic appearing, vital signs stable, patient denies chest 

pain or shortness of breath.  Family is at bedside.


Discussed case with Dr. Santiago and the need for transfusion.  He felt the patient 

would be admitted for observation pending transfusion when available.





Departure





- Departure


Time of Disposition: 17:43


Disposition: Refer to Observation


Condition: Fair


Clinical Impression: 


Anemia


Qualifiers:


 Anemia type: unspecified type Qualified Code(s): D64.9 - Anemia, unspecified








- Discharge Information


Referrals: 


Nishant Hurtado MD [Primary Care Provider] - 


Forms:  ED Department Discharge





- My Orders


Last 24 Hours: 





My Active Orders





03/13/18 17:02


CBC WITH AUTO DIFF [HEME] Stat 


COMPREHENSIVE METABOLIC PN,CMP [CHEM] Stat 














- Assessment/Plan


Last 24 Hours: 





My Active Orders





03/13/18 17:02


CBC WITH AUTO DIFF [HEME] Stat 


COMPREHENSIVE METABOLIC PN,CMP [CHEM] Stat 











Assessment:: 





Anemia


Plan: 





Admitted for observation to Dr. Santiago

## 2018-03-14 RX ADMIN — Medication SCH: at 17:48

## 2018-03-14 RX ADMIN — Medication SCH TAB: at 11:19

## 2018-03-14 RX ADMIN — Medication SCH MG: at 09:25

## 2018-03-14 RX ADMIN — OMEPRAZOLE SCH MG: 20 CAPSULE, DELAYED RELEASE ORAL at 07:51

## 2018-03-14 RX ADMIN — VITAMIN D, TAB 1000IU (100/BT) SCH UNITS: 25 TAB at 08:19

## 2018-03-14 RX ADMIN — CYANOCOBALAMIN TAB 500 MCG SCH MCG: 500 TAB at 08:17

## 2018-03-14 RX ADMIN — CARVEDILOL SCH EACH: 12.5 TABLET, FILM COATED ORAL at 11:12

## 2018-03-14 NOTE — PCM.PN
- General Info


Date of Service: 03/14/18


Functional Status: Reports: Pain Controlled, Tolerating Diet, Urinating, New 

Symptoms (Significant weakness).  Denies: Ambulating





- Review of Systems


General: Reports: Weakness


HEENT: Reports: No Symptoms


Pulmonary: Reports: No Symptoms


Cardiovascular: Reports: No Symptoms


Gastrointestinal: Reports: No Symptoms


Genitourinary: Reports: Incontinence (Incontinence at times)


Musculoskeletal: Reports: No Symptoms


Skin: Reports: Pallor


Neurological: Reports: Pre-Existing Deficit, Difficulty Walking, Weakness, Gait 

Disturbance.  Denies: Tremors, Change in Speech


Psychiatric: Reports: No Symptoms





- Patient Data


Vitals - Most Recent: 


 Last Vital Signs











Temp  98.6 F   03/14/18 06:26


 


Pulse  76   03/14/18 06:26


 


Resp  18   03/14/18 06:26


 


BP  101/48 L  03/14/18 06:26


 


Pulse Ox  92 L  03/14/18 08:00











Weight - Most Recent: 140 lb 12.8 oz


I&O - Last 24 Hours: 


 Intake & Output











 03/13/18 03/14/18 03/14/18





 22:59 06:59 14:59


 


Intake Total  150 


 


Balance  150 











Lab Results Last 24 Hours: 


 Laboratory Results - last 24 hr











  03/14/18 Range/Units





  07:40 


 


PT  TNP  


 


INR  2.0 H  (0.9-1.1)  











Med Orders - Current: 


 Current Medications





Allopurinol (Zyloprim)  100 mg PO DAILY [facility]


   Last Admin: 03/14/18 08:19 Dose:  100 mg


Aspirin (Halfprin)  81 mg PO DAILY [facility]


   Last Admin: 03/14/18 08:17 Dose:  81 mg


Calcium Citrate (Calcium Citrate + D)  2 tab PO BID@1200,1800 [facility]


Cholecalciferol (Vitamin D3)  2,000 units PO DAILY [facility]


   Last Admin: 03/14/18 08:19 Dose:  2,000 units


Cyanocobalamin (Vitamin B12)  500 mcg PO MOTUWETHFR@0800 [facility]


   Last Admin: 03/14/18 08:17 Dose:  500 mcg


Furosemide (Lasix)  40 mg PO Q2D [facility]


   Last Admin: 03/14/18 09:25 Dose:  40 mg


Lactobacillus Acidophilus/Rhamnosus (Multi-Cynthia Plus)  1 cap PO DAILY [facility]


   Last Admin: 03/14/18 08:18 Dose:  1 cap


Levothyroxine Sodium (Synthroid)  100 mcg PO DAILY@0730 [facility]


   Last Admin: 03/14/18 07:51 Dose:  100 mcg


Levothyroxine Sodium (Levothyroxine)  25 mcg PO DAILY@0730 [facility]


   Last Admin: 03/14/18 07:51 Dose:  25 mcg


Melatonin (Melatonin)  3 mg PO BEDTIME PRN


   PRN Reason: INSOMNIA


Mirtazapine (Remeron)  15 mg PO BEDTIME [facility]


   Last Admin: 03/13/18 21:54 Dose:  15 mg


**Non-Form**Jadenu 1 (,000 Mg)  1,000 mg PO DAILY@0600 [facility]


Omeprazole (Omeprazole)  20 mg PO ACBREAKFAST [facility]


   Last Admin: 03/14/18 07:51 Dose:  20 mg


Oxycodone HCl (Oxycodone)  5 mg PO BID [facility]


   Last Admin: 03/14/18 09:27 Dose:  Not Given


Phytonadione (Vitamin K)  100 mcg PO DAILY [facility]


   Last Admin: 03/14/18 08:19 Dose:  100 mcg


Potassium Chloride (Klor-Con 10)  10 meq PO Q2D [facility]


   Last Admin: 03/14/18 09:26 Dose:  10 meq


Prednisolone Acetate (Pred Forte 1% Ophth Susp)  0 ml EYEBOTH BID [facility]


   Last Admin: 03/14/18 09:28 Dose:  Not Given


Prednisone (Prednisone)  2 mg PO DAILY@1800 [facility]


Pyridoxine HCl (Vitamin B6-Pyridoxine)  100 mg PO DAILY [facility]


   Last Admin: 03/14/18 09:25 Dose:  100 mg


Warfarin Sodium (Coumadin)  2.5 mg PO Fr@1800 [facility]


Warfarin Sodium (Coumadin)  5 mg PO SuMoTuWeThSa@1800 [facility]


   Last Admin: 03/13/18 21:57 Dose:  Not Given





Discontinued Medications





Furosemide (Lasix)  40 mg PO Q2D [facility]


   Last Admin: 03/14/18 03:46 Dose:  Not Given


Potassium Chloride (Klor-Con M20)  10 meq PO Q2D [facility]


   Last Admin: 03/14/18 03:45 Dose:  Not Given


Potassium Chloride (Klor-Con M20)  10 meq PO Q2D [facility]











- Exam


Quality Assessment: No: Supplemental Oxygen


General: Alert, Oriented, Cooperative, No Acute Distress


Neck: Supple


Lungs: Clear to Auscultation, Normal Respiratory Effort


Cardiovascular: Regular Rate, Regular Rhythm


GI/Abdominal Exam: No: No Distention


 (Female) Exam: Deferred


Back Exam: No: CVA Tenderness (L), CVA Tenderness (R)


Extremities: No Pedal Edema


Neurological: No New Focal Deficit


Psy/Mental Status: Alert, Normal Affect, Normal Mood





- Problem List Review


Problem List Initiated/Reviewed/Updated: Yes





- Plan


Plan:: 


HISTORY


83-year-old female was in admitted due to weakness due to significant anemia. 

She is a resident of a long-term care center presented to the ED for low 

hemoglobin 5.8 and overall profound weakness. Patient is being followed closely 

by hematology oncology for her myelodysplastic syndrome and chronic anemia and 

has been receiving multiple blood transfusions now with developing antibodies 

which delays necessitating blood transfusions in a timely manner. 


____________________________________________________________





Diagnosis


Anemia


Generalized weakness








Hypothyroidism


MDS


Hypertension


HfpEF


History of MI


CAD


HLD insomnia


History of DVTs and PE


GERD


Renal insufficiency


Polymalgia rheumatica. 


Depression


GCA


Lumbago with compression fractures


Hx of iron overload


Hx Gout








PLAN: 





HEMATOLOGY:  Refractory anemia due to myelodysplastic syndrome, Hgb/Hct 6.1/

18.7 RBC 2.1, WBC 3.1 with eosinophilia, will require blood transfusion however 

with antibodies could delay. Transfuse 2 Units slowly continue with home loop 

diuretic  





PULMONARY: POX 92% room air, lungs CTA, currently on diuretic therapy 





CV: Stable combined congestive heart failure. Ejection fraction 75% with normal 

left ventricle size, hyperdynamic systolic function, and mild diastolic 

dysfunction  Continue lasix, On chronic anticoagulation with con-conmittent 

vitamin K, history of PE/DVT, Cont with ASA, INR 2.0, 





FLUIDS, ELECTROLYTES, AND RENAL:  All dehydration today, start gentle IV fluids

, continue by mouth, sodium, calcium and potassium normal. BUN/creatinine ratio 

26:1--slightly prerenal





ENDOCRINE:  Acquired hypothyroidism, on thyroid replacement therapy





GI: No vomiting, tolerating by mouth, will add gentle IV fluids. PPI therapy





INFECTIOUS DISEASE: Immunocompromised host, decreased WBC, on chronic steroids, 

no fever, infection, MAP adequate





HEALTH MAINTENANCE:  On PPI for GERD, anticoagulation,  melatonin daily at 

bedtime








OVERALL PLAN: Continue hospitalization due to weakness, IV fluids gentle, 

transfused 2 units packed red blood cells upon availability. Start discussion 

with possible hospice referral, will consult with [facility]'s hematologist 

oncologist at some point for recommendations since patient is requiring more 

frequently multiple blood transfusions/blood products with difficulties with 

autoantibodies

## 2018-03-14 NOTE — HP
03/13/2018PATIENT NAME:  RAJANI ARAIZA

 

PATIENT PROFILE:  The patient is an 83-year-old patient from [place], [place], who presented to the emergency room this afternoon from the nursing

facility and complained of low hemoglobin and weakness.  The patient was found

to have low levels of hemoglobin this morning.  The patient's hemoglobin was

found to be 5.8 and white count was 2.8.  The patient does have a history of

myelodysplastic syndrome and chronic anemia.  She also has a previous history of

hypertension, hypercholesteremia, history of pulmonary embolism, giant cell

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

polymyalgia rheumatica, diplopia, thoracic aortic aneurysm, acute on chronic

lumbar back pain, history of compression fractures of the softer thoracic

vertebrae, major depression, refractory anemia due to myelodysplastic syndrome,

neutropenia, chronic fatigue, ankle edema, leg ulcer, and iron overload due to

repeat blood transfusions, antidote to blood cells at this time.  Previous

history of coronary artery disease, congestive heart failure, and myocardial

infarction.

 

ALLERGIES:  Penicillin, Bactrim, erythromycin, Revlimid.

 Family History: Nil significant. 

CURRENT MEDICATIONS:  Include oxycodone IR 5 mg two times a day as necessary,

acetaminophen 60 mg every 4 hours as necessary, allopurinol 100 mg one time a

day, Exjade 500 mg tablets two tablets once a day, prednisolone acetate

ophthalmic suspension one drop twice a day to both eyes, Artificial Tears on a

p.r.n. basis, prednisone 2 mg daily, melatonin 3 mg at bedtime, Remeron 15 mg at

bedtime, Protonix 20 mg daily, warfarin 5 mg once a day, levothyroxine 125 mcg

daily, aspirin 81 mg daily, Lasix 40 every other day, potassium chloride 10 mEq

every other day, lactobacillus probiotic one tab daily, vitamin K 100 mcg daily,

pyridoxine 100 mg daily, calcium cholecalciferol 2000, vitamin D 2000 units

daily, calcium with carbonate 600 mg of calcium and vitamin D 400 units daily.

Prolia injections.

 

SOCIAL/FAMILY HISTORY:  Patient lives in town.  Her daughter also lives in town.

 

PAST SURGICAL HISTORY:  Includes adenoidectomy, cataract surgery, tonsillectomy,

appendectomy, cholecystectomy, colonoscopy.  She also has had vertebroplasty.

 

REVIEW OF SYSTEMS:  HEAD AND NECK:  No complaints.

EYES:  No complaints.

EARS, NOSE, AND THROAT:  No complaints.

GENERAL:  Complaints of weakness.

CHEST:  No complaints of chest pain.

LUNGS:  No complaints of breathing difficulty.

ABDOMEN:  No complaints.

EXTREMITIES:  Complaints of edema and aching.

NEUROLOGICAL:  No complaints.

UROLOGICAL:  No complaints.

 

PHYSICAL EXAMINATION:  GENERAL:  Reveals a very pleasant, elderly patient,

appears to be pale.  VITAL SIGNS:  As follow:  Her height is 5 feet 4 inches,

weight 140 pounds, temperature 98.6, pulse 89, blood pressure 127/94, oxygen

saturation 95%.

HEAD:  Negative.

EYES:  Arcus senilis.  Conjunctivae pale.

EARS, NOSE, AND THROAT:  Normal.

NECK:  Supple.  Full range of motion.  No midline swelling.

LUNGS:  Clinically clear to percussion and auscultation.

HEART:  Regular rhythm.  No thrills, no murmurs.

ABDOMEN:  Soft.

EXTREMITIES:  Normal.

NEUROLOGIC:  Grossly intact.  The patient does have edema of the lower

extremities.

 

FINAL DIAGNOSES:

1. Severe anemia caused from myelodysplastic syndrome.

2. Anemia is complicated by the development of antidotes.

3. Leukopenia.

4. Other medical history is positive for history of pulmonary embolism,

    history of DVT, history of varicose veins, history of left lower extremity

    ulcers, history of polymyalgia rheumatica, history of thoracic aortic

    aneurysm, history of acute lumbar pain due to compression fractures.

5. History of vertebroplasty.

6. History of neutropenia.

7. History of chronic fatigue.

8. History of ankle edema.

9. History of iron overload due to repeat red blood cell transfusion.

10.History of coronary artery disease. history of myocardial infarction.

11.History of combined systolic and diastolic heart failure.

 

PLAN:  Would be to admit this patient for observation and transfuse this

patient.  Unfortunately, her condition is complicated by the development of

antidotes to packed cells, therefore the sample would have to be sent to [facility]

and an appropriate match would have to be obtained from [facility].  We will also

discuss with her about continuing

the need for RBCs, if she wants to make a decision regarding ongoing care of

anemia, etc.

 

DD:  03/13/2018 21:11:10

DT:  03/14/2018 01:13:56

Job #:  [id number]

MTDD

## 2018-03-15 VITALS — DIASTOLIC BLOOD PRESSURE: 59 MMHG | SYSTOLIC BLOOD PRESSURE: 114 MMHG

## 2018-03-15 RX ADMIN — VITAMIN D, TAB 1000IU (100/BT) SCH UNITS: 25 TAB at 09:59

## 2018-03-15 RX ADMIN — CARVEDILOL SCH EACH: 12.5 TABLET, FILM COATED ORAL at 06:06

## 2018-03-15 RX ADMIN — Medication SCH MG: at 10:00

## 2018-03-15 RX ADMIN — OMEPRAZOLE SCH MG: 20 CAPSULE, DELAYED RELEASE ORAL at 07:47

## 2018-03-15 RX ADMIN — CYANOCOBALAMIN TAB 500 MCG SCH MCG: 500 TAB at 07:49

## 2018-03-21 ENCOUNTER — HOSPITAL ENCOUNTER (INPATIENT)
Dept: HOSPITAL 77 - KA.ED | Age: 83
LOS: 6 days | Discharge: NURSING, REHAB OR LONG-TERM CARE | DRG: 811 | End: 2018-03-27
Attending: FAMILY MEDICINE | Admitting: PHYSICIAN ASSISTANT
Payer: MEDICARE

## 2018-03-21 DIAGNOSIS — I25.10: ICD-10-CM

## 2018-03-21 DIAGNOSIS — R74.8: ICD-10-CM

## 2018-03-21 DIAGNOSIS — I50.30: ICD-10-CM

## 2018-03-21 DIAGNOSIS — M35.3: ICD-10-CM

## 2018-03-21 DIAGNOSIS — D63.8: Primary | ICD-10-CM

## 2018-03-21 DIAGNOSIS — T80.911A: ICD-10-CM

## 2018-03-21 DIAGNOSIS — Z51.5: ICD-10-CM

## 2018-03-21 DIAGNOSIS — N39.0: ICD-10-CM

## 2018-03-21 DIAGNOSIS — Z88.0: ICD-10-CM

## 2018-03-21 DIAGNOSIS — E86.0: ICD-10-CM

## 2018-03-21 DIAGNOSIS — M31.5: ICD-10-CM

## 2018-03-21 DIAGNOSIS — Z88.8: ICD-10-CM

## 2018-03-21 DIAGNOSIS — Z99.81: ICD-10-CM

## 2018-03-21 DIAGNOSIS — Z79.899: ICD-10-CM

## 2018-03-21 DIAGNOSIS — I25.2: ICD-10-CM

## 2018-03-21 DIAGNOSIS — F32.9: ICD-10-CM

## 2018-03-21 DIAGNOSIS — Z66: ICD-10-CM

## 2018-03-21 DIAGNOSIS — D46.4: ICD-10-CM

## 2018-03-21 DIAGNOSIS — R53.1: ICD-10-CM

## 2018-03-21 DIAGNOSIS — Z88.2: ICD-10-CM

## 2018-03-21 DIAGNOSIS — R01.1: ICD-10-CM

## 2018-03-21 DIAGNOSIS — N28.9: ICD-10-CM

## 2018-03-21 DIAGNOSIS — R57.0: ICD-10-CM

## 2018-03-21 DIAGNOSIS — E78.00: ICD-10-CM

## 2018-03-21 DIAGNOSIS — I13.0: ICD-10-CM

## 2018-03-21 DIAGNOSIS — E03.9: ICD-10-CM

## 2018-03-21 DIAGNOSIS — K21.9: ICD-10-CM

## 2018-03-21 DIAGNOSIS — I21.4: ICD-10-CM

## 2018-03-21 DIAGNOSIS — I95.9: ICD-10-CM

## 2018-03-21 DIAGNOSIS — C94.6: ICD-10-CM

## 2018-03-21 DIAGNOSIS — Z86.73: ICD-10-CM

## 2018-03-21 DIAGNOSIS — N18.3: ICD-10-CM

## 2018-03-21 DIAGNOSIS — Z79.01: ICD-10-CM

## 2018-03-21 DIAGNOSIS — Z79.82: ICD-10-CM

## 2018-03-21 DIAGNOSIS — E78.5: ICD-10-CM

## 2018-03-21 LAB
CHLORIDE SERPL-SCNC: 107 MMOL/L (ref 98–115)
SODIUM SERPL-SCNC: 144 MMOL/L (ref 136–145)

## 2018-03-21 NOTE — EDM.PDOC
ED HPI GENERAL MEDICAL PROBLEM





- General


Chief Complaint: General


Stated Complaint: fever/nausea


Time Seen by Provider: 03/21/18 21:34


Source of Information: Reports: Patient, Family, Nursing Home Records


History Limitations: Reports: No Limitations





- History of Present Illness


INITIAL COMMENTS - FREE TEXT/NARRATIVE: 





82 YO WF presents to ER complaining of generalized weakness with nausea/

vomiting which began tonight. Pt was seen in NH yesterday by Dr Magy Hurtado for 

fever and diagnosed with UTI. Pt reports she took her first dose of antibiotics 

today but started to feel worse at dinner tonight. Pt had a documented 102.0 

temp at nursing home tonight prompting ER visit. Pt denies chest pain but 

states she is chronically short of breath and on supplemental O2 at nursing 

home mostly all the time. Pt with history of chronic anemia due to 

Myelodysplastic disorder requiring weekly blood transfusions. 


Onset Date: 03/20/18


Duration: Day(s): (2)


Location: Reports: Generalized


Severity: Moderate


Improves with: Reports: None


Worsens with: Reports: None


Associated Symptoms: Reports: Cough, Fever/Chills, Loss of Appetite, Malaise, 

Nausea/Vomiting, Shortness of Breath, Weakness.  Denies: Chest Pain, cough w 

sputum, Diaphoresis, Headaches, Rash, Seizure, Syncope





- Related Data


 Allergies











Allergy/AdvReac Type Severity Reaction Status Date / Time


 


erythromycin base Allergy Severe Vomiting Verified 03/21/18 21:35





[Erythromycin Base]     


 


Penicillins Allergy Unknown Hives Verified 03/21/18 21:35


 


Sulfa (Sulfonamide Allergy  Other Verified 03/21/18 21:35





Antibiotics)     


 


sulfamethoxazole Allergy  Other Verified 03/21/18 21:35





[From Bactrim]     


 


trimethoprim [From Bactrim] Allergy  Other Verified 03/21/18 21:35


 


lenalidomide [From Revlimid] AdvReac Mild Rash Verified 03/21/18 21:35











Home Meds: 


 Home Meds





Cholecalciferol (Vitamin D3) [Vitamin D3] 2,000 unit PO DAILY 12/27/13 [History]


Cyanocobalamin (Vitamin B-12) [B-12 Dots] 500 mcg PO MOTUWETHFR@0800 12/27/13 [

History]


Calcium Carbonate/Vitamin D3 [Calcium 600-Vit D3 400 Tablet] 1 tab PO BID@1200,

1800 12/01/15 [History]


Mirtazapine [Remeron] 15 mg PO BEDTIME 04/29/16 [History]


Phytonadione [Vitamin K] 100 mcg PO DAILY 04/29/16 [History]


Pyridoxine HCl [Vitamin B-6] 100 mg PO DAILY 04/29/16 [History]


Aspirin [Halfprin] 81 mg PO DAILY #90 tab.ec 05/05/17 [Rx]


Lactobacillus Acidophilus [Acidophilus Lactobacillus] 1 cap PO DAILY 05/09/17 [

History]


Levothyroxine Sodium [Synthroid] 125 mcg PO ACBREAKFAST 05/09/17 [History]


Melatonin/Pyridoxine HCl (B6) [Melatonin 3 mg Tablet] 3 mg PO BEDTIME PRN 05/09/ 17 [History]


Furosemide [Lasix] 40 mg PO Q2D #0 05/12/17 [Rx]


Potassium Chloride [Klor-Con M20] 10 meq PO Q2D #0 05/12/17 [Rx]


Deferasirox [Jadenu] 1,000 mg PO DAILY@0600 06/04/17 [History]


Pantoprazole Sodium [Protonix] 20 mg PO ACBREAKFAST 06/04/17 [History]


Warfarin [Coumadin] 5 mg PO SUMOTUWETHSA 07/21/17 [History]


predniSONE [Prednisone] 2 mg PO DAILY@1800 07/21/17 [History]


prednisoLONE Acetate [Pred Forte 1% Ophth Susp] 1 drop EYEBOTH BID 07/21/17 [

History]


Allopurinol [Zyloprim] 100 mg PO DAILY 11/21/17 [History]


Denosumab [Prolia] 60 mg SUBCUT ASDIRECTED 11/21/17 [History]


oxyCODONE 5 mg PO BID 11/21/17 [History]


Warfarin [Coumadin] 2.5 mg PO FR 03/13/18 [History]











Past Medical History


HEENT History: Reports: Cataract, Hard of Hearing, Impaired Vision, Sinusitis


Cardiovascular History: Reports: Blood Clots/VTE/DVT, High Cholesterol, PVD, 

SOB on Exertion


Respiratory History: Reports: Bronchitis, Recurrent, PE, Pneumonia, Recurrent, 

SOB


Other Respiratory History: Chronic use of oxygen


Gastrointestinal History: Reports: Bowel Obstruction, Chronic Diarrhea, Fecal 

Incontinence, GERD


Genitourinary History: Reports: UTI, Recurrent


OB/GYN History: Reports: Endometriosis, Pregnancy


Musculoskeletal History: Reports: Back Pain, Chronic, Fibromyalgia, Other (See 

Below)


Other Musculoskeletal History: POLYMYALGIA


Neurological History: Reports: Vertigo


Psychiatric History: Reports: Depression


Endocrine/Metabolic History: Reports: Hypothyroidism, Osteopenia, Osteoporosis


Hematologic History: Reports: Anemia, Blood Transfusion(s), Other (See Below)


Other Hematologic History: Myelodysplastic Syndrome


Immunologic History: Reports: None


Oncologic (Cancer) History: Reports: None


Dermatologic History: Reports: None





- Infectious Disease History


Infectious Disease History: Reports: Other (See Below)


Other Infectious Disease History: Unable to assess.





- Past Surgical History


Head Surgeries/Procedures: Reports: None


HEENT Surgical History: Reports: Adenoidectomy, Cataract Surgery, Tonsillectomy


GI Surgical History: Reports: Appendectomy, Cholecystectomy, Colonoscopy, Other 

(See Below)


Endocrine Surgical History: Reports: None


Neurological Surgical History: Reports: None


Oncologic Surgical History: Reports: None





Social & Family History





- Family History


Family Medical History: Noncontributory


HEENT: Reports: None


Cardiac: Reports: None


Respiratory: Reports: None


GI: Reports: None


: Reports: Other (See Below)


OBGYN: Reports: None


Musculoskeletal: Reports: None


Neurological: Reports: None


Psychiatric: Reports: None


Endocrine/Metabolic: Reports: None


Hematologic: Reports: None


Immunologic: Reports: None


Dermatologic: Reports: None


Oncologic: Reports: Hodgkin's Lymphoma





- Tobacco Use


Smoking Status *Q: Never Smoker


Second Hand Smoke Exposure: No





- Caffeine Use


Caffeine Use: Reports: Coffee, Tea





- Alcohol Use


Days Per Week of Alcohol Use: 0





- Recreational Drug Use


Recreational Drug Use: No





ED ROS GENERAL





- Review of Systems


Review Of Systems: See Below


Constitutional: Reports: Fever, Chills, Malaise, Weakness


HEENT: Reports: No Symptoms


Respiratory: Reports: Shortness of Breath, Cough


Cardiovascular: Reports: No Symptoms


Endocrine: Reports: No Symptoms


GI/Abdominal: Reports: Nausea, Vomiting


: Reports: Frequency


Musculoskeletal: Reports: No Symptoms


Skin: Reports: No Symptoms


Neurological: Reports: No Symptoms


Psychiatric: Reports: No Symptoms


Hematologic/Lymphatic: Reports: Anemia


Immunologic: Reports: No Symptoms





ED EXAM, GENERAL





- Physical Exam


Exam: See Below


Exam Limited By: No Limitations


General Appearance: Alert, WD/WN, No Apparent Distress


Ears: Normal External Exam, Normal Canal, Hearing Grossly Normal, Normal TMs


Ear Exam: Bilateral Ear: Auricle Normal, Canal Normal, TM normal


Nose: Normal Inspection, Normal Mucosa, No Blood


Throat/Mouth: Normal Inspection, Normal Lips, Normal Teeth, Normal Gums, Normal 

Oropharynx, Normal Voice, No Airway Compromise


Head: Atraumatic, Normocephalic


Neck: Normal Inspection, Supple, Non-Tender, Full Range of Motion


Respiratory/Chest: No Respiratory Distress, Lungs Clear, Normal Breath Sounds, 

No Accessory Muscle Use, Chest Non-Tender


Cardiovascular: Normal Peripheral Pulses, Regular Rate, Rhythm, No Edema, No 

Gallop, No JVD, No Rub, Systolic Murmur


GI/Abdominal: Normal Bowel Sounds, Soft, Non-Tender, No Organomegaly, No 

Distention, No Abnormal Bruit, No Mass


Back Exam: Normal Inspection, Full Range of Motion, NT


Extremities: Normal Inspection, Normal Range of Motion, Non-Tender, No Pedal 

Edema, Normal Capillary Refill, Pedal Edema


Neurological: Alert, Oriented, CN II-XII Intact, Normal Cognition, Normal Gait, 

Normal Reflexes, No Motor/Sensory Deficits


Psychiatric: Normal Affect, Normal Mood


Skin Exam: Warm, Dry, Intact, Normal Color, No Rash


Lymphatic: No Adenopathy





EKG INTERPRETATION


EKG Date: 03/21/18


Time: 22:31


Rhythm: NSR


Rate (Beats/Min): 89


Axis: LAD-Left Axis Deviation


P-Wave: Present


QRS: Normal


ST-T: Normal


QT: Normal





Course





- Vital Signs


Last Recorded V/S: 


 Last Vital Signs











Temp  38.1 C   03/21/18 21:35


 


Pulse  94   03/21/18 21:35


 


Resp  24 H  03/21/18 21:35


 


BP  118/57 L  03/21/18 21:35


 


Pulse Ox  92 L  03/21/18 21:35














- Orders/Labs/Meds


Orders: 


 Active Orders 24 hr











 Category Date Time Status


 


 EKG Documentation Completion [RC] ASDIRECTED Care  03/21/18 21:48 Active


 


 Chest 1V Frontal [CR] Stat Exams  03/21/18 21:42 Ordered


 


 CULTURE BLOOD [BC] Stat Lab  03/21/18 21:36 Ordered


 


 CULTURE BLOOD [BC] Stat Lab  03/21/18 21:50 Received


 


 URINALYSIS W/MICROSCOPIC [UA W/MICROSCOPIC] [URIN] Stat Lab  03/21/18 21:35 

Ordered


 


 Sodium Chloride 0.9% [Normal Saline] 1,000 ml Med  03/21/18 21:45 Active





 IV ASDIRECTED   


 


 Blood Culture x2 Reflex Set [OM.PC] Stat Oth  03/21/18 21:35 Ordered


 


 EKG 12 Lead [EK] Routine Ther  03/21/18 21:47 Ordered








 Medication Orders





Sodium Chloride (Normal Saline)  1,000 mls @ 150 mls/hr IV ASDIRECTED STEFANO


   Last Admin: 03/21/18 22:08  Dose: 150 mls/hr








Labs: 


 Laboratory Tests











  03/21/18 03/21/18 03/21/18 Range/Units





  21:50 21:50 21:50 


 


WBC  5.4    (5.0-10.0)  10^3/uL


 


RBC  2.22 L    (3.80-5.50)  10^6/uL


 


Hgb  6.3 L*    (12.0-16.0)  g/dL


 


Hct  19.3 L*    (37.0-47.0)  %


 


MCV  86.7    (82.0-92.0)  fL


 


MCH  28.5    (27.0-31.0)  pg


 


MCHC  32.9    (32.0-36.0)  g/dL


 


RDW  15.2 H    (11.5-14.5)  %


 


Plt Count  170    (150-300)  10^3/uL


 


MPV  9.6    (7.4-10.4)  fL


 


Neut % (Auto)  86.9 H    (50.0-70.0)  %


 


Lymph % (Auto)  9.0 L    (20.0-40.0)  %


 


Mono % (Auto)  1.1 L    (2.0-8.0)  %


 


Eos % (Auto)  2.9    (1.0-3.0)  %


 


Baso % (Auto)  0.1    (0.0-1.0)  %


 


Neut # (Auto)  4.6    (2.5-7.0)  10^3/uL


 


Lymph # (Auto)  0.5 L    (1.0-4.0)  10^3/uL


 


Mono # (Auto)  0.1    (0.1-0.8)  10^3/uL


 


Eos # (Auto)  0.2    (0.1-0.3)  10^3/uL


 


Baso # (Auto)  0.0    (0.0-0.1)  10^3/uL


 


Sodium   144   (136-145)  mmol/L


 


Potassium   3.5   (3.3-5.3)  mmol/L


 


Chloride   107   ()  mmol/L


 


Carbon Dioxide   25.2   (21.0-32.0)  mmol/L


 


BUN   29 H   (6-25)  mg/dL


 


Creatinine   1.20 H   (0.51-1.17)  mg/dL


 


Est Cr Clr Drug Dosing   30.67   mL/min


 


Estimated GFR (MDRD)   43   mL/min


 


Glucose   148 H   ()  mg/dL


 


Lactic Acid    1.6  (0.4-2.0)  mmol/L


 


Calcium   8.6 L   (8.7-10.3)  mg/dL


 


Total Bilirubin   0.9   (0.2-1.0)  mg/dL


 


AST   24   (15-37)  U/L


 


ALT   39   (12-78)  U/L


 


Alkaline Phosphatase   71   ()  IU/L


 


Creatine Kinase     ()  U/L


 


CK-MB (CK-2)     (0.00-4.30)  ng/mL


 


Troponin I     (0.00-0.070)  ng/mL


 


B-Natriuretic Peptide     (0-100)  pg/mL


 


Total Protein   6.0 L   (6.4-8.2)  g/dL


 


Albumin   3.17   (3.00-4.80)  g/dL














  03/21/18 Range/Units





  21:50 


 


WBC   (5.0-10.0)  10^3/uL


 


RBC   (3.80-5.50)  10^6/uL


 


Hgb   (12.0-16.0)  g/dL


 


Hct   (37.0-47.0)  %


 


MCV   (82.0-92.0)  fL


 


MCH   (27.0-31.0)  pg


 


MCHC   (32.0-36.0)  g/dL


 


RDW   (11.5-14.5)  %


 


Plt Count   (150-300)  10^3/uL


 


MPV   (7.4-10.4)  fL


 


Neut % (Auto)   (50.0-70.0)  %


 


Lymph % (Auto)   (20.0-40.0)  %


 


Mono % (Auto)   (2.0-8.0)  %


 


Eos % (Auto)   (1.0-3.0)  %


 


Baso % (Auto)   (0.0-1.0)  %


 


Neut # (Auto)   (2.5-7.0)  10^3/uL


 


Lymph # (Auto)   (1.0-4.0)  10^3/uL


 


Mono # (Auto)   (0.1-0.8)  10^3/uL


 


Eos # (Auto)   (0.1-0.3)  10^3/uL


 


Baso # (Auto)   (0.0-0.1)  10^3/uL


 


Sodium   (136-145)  mmol/L


 


Potassium   (3.3-5.3)  mmol/L


 


Chloride   ()  mmol/L


 


Carbon Dioxide   (21.0-32.0)  mmol/L


 


BUN   (6-25)  mg/dL


 


Creatinine   (0.51-1.17)  mg/dL


 


Est Cr Clr Drug Dosing   mL/min


 


Estimated GFR (MDRD)   mL/min


 


Glucose   ()  mg/dL


 


Lactic Acid   (0.4-2.0)  mmol/L


 


Calcium   (8.7-10.3)  mg/dL


 


Total Bilirubin   (0.2-1.0)  mg/dL


 


AST   (15-37)  U/L


 


ALT   (12-78)  U/L


 


Alkaline Phosphatase   ()  IU/L


 


Creatine Kinase  19 L  ()  U/L


 


CK-MB (CK-2)  0.30  (0.00-4.30)  ng/mL


 


Troponin I  0.10 H*  (0.00-0.070)  ng/mL


 


B-Natriuretic Peptide  487 H  (0-100)  pg/mL


 


Total Protein   (6.4-8.2)  g/dL


 


Albumin   (3.00-4.80)  g/dL











Meds: 


Medications











Generic Name Dose Route Start Last Admin





  Trade Name Freq  PRN Reason Stop Dose Admin


 


Sodium Chloride  1,000 mls @ 150 mls/hr  03/21/18 21:45  03/21/18 22:08





  Normal Saline  IV   150 mls/hr





  ASDIRECTED STEFANO   Administration














- Radiology Interpretation


Free Text/Narrative:: 





CXR- NAD





Departure





- Departure


Time of Disposition: 23:04


Disposition: Admitted As Inpatient 66


Condition: Poor


Clinical Impression: 


 UTI, Urinary tract infectious disease, Elevated troponin I level, Renal 

insufficiency, mild





Anemia


Qualifiers:


 Anemia type: other cause 








- Discharge Information


Referrals: 


Nishant Hurtado MD [Primary Care Provider] - 


Forms:  ED Department Discharge





- My Orders


Last 24 Hours: 


My Active Orders





03/21/18 21:35


URINALYSIS W/MICROSCOPIC [UA W/MICROSCOPIC] [URIN] Stat 


Blood Culture x2 Reflex Set [OM.PC] Stat 





03/21/18 21:36


CULTURE BLOOD [BC] Stat 





03/21/18 21:42


Chest 1V Frontal [CR] Stat 





03/21/18 21:45


Sodium Chloride 0.9% [Normal Saline] 1,000 ml IV ASDIRECTED 





03/21/18 21:47


EKG 12 Lead [EK] Routine 





03/21/18 21:48


EKG Documentation Completion [RC] ASDIRECTED 





03/21/18 21:50


CULTURE BLOOD [BC] Stat 














- Assessment/Plan


Last 24 Hours: 


My Active Orders





03/21/18 21:35


URINALYSIS W/MICROSCOPIC [UA W/MICROSCOPIC] [URIN] Stat 


Blood Culture x2 Reflex Set [OM.PC] Stat 





03/21/18 21:36


CULTURE BLOOD [BC] Stat 





03/21/18 21:42


Chest 1V Frontal [CR] Stat 





03/21/18 21:45


Sodium Chloride 0.9% [Normal Saline] 1,000 ml IV ASDIRECTED 





03/21/18 21:47


EKG 12 Lead [EK] Routine 





03/21/18 21:48


EKG Documentation Completion [RC] ASDIRECTED 





03/21/18 21:50


CULTURE BLOOD [BC] Stat 











Assessment:: 





1. Profound Anemia


2. Urinary tract infection


3. Elevated/Equivocal trop I


4. Renal insufficiency


Plan: 





1. Admit to Dr Hurtado


2. levaquin 500mg IV QD


3. traNSFUSE 2 units PRBC


4. supportive care


5. serial trop I

## 2018-03-22 RX ADMIN — CARVEDILOL SCH EACH: 12.5 TABLET, FILM COATED ORAL at 11:16

## 2018-03-22 RX ADMIN — Medication SCH MG: at 12:42

## 2018-03-22 RX ADMIN — CYANOCOBALAMIN TAB 500 MCG SCH MCG: 500 TAB at 12:28

## 2018-03-22 RX ADMIN — VITAMIN D, TAB 1000IU (100/BT) SCH UNITS: 25 TAB at 12:25

## 2018-03-22 RX ADMIN — LEVOFLOXACIN SCH MLS/HR: 5 INJECTION, SOLUTION INTRAVENOUS at 16:13

## 2018-03-22 RX ADMIN — Medication SCH TAB: at 12:29

## 2018-03-22 RX ADMIN — Medication SCH TAB: at 17:41

## 2018-03-22 RX ADMIN — OMEPRAZOLE SCH MG: 20 CAPSULE, DELAYED RELEASE ORAL at 12:28

## 2018-03-22 NOTE — PCM.HP
H&P History of Present Illness





- General


Date of Service: 03/22/18


Admit Problem/Dx: 


 Admission Diagnosis/Problem





Admission Diagnosis/Problem      Urinary tract infection








Source of Information: Patient, Old Records, RN


History Limitations: Reports: No Limitations


  ** Right Feet


Pain Score (Numeric/FACES): 3





- Related Data


Allergies/Adverse Reactions: 


 Allergies











Allergy/AdvReac Type Severity Reaction Status Date / Time


 


erythromycin base Allergy Severe Vomiting Verified 03/21/18 21:35





[Erythromycin Base]     


 


Penicillins Allergy Unknown Hives Verified 03/21/18 21:35


 


Sulfa (Sulfonamide Allergy  Other Verified 03/21/18 21:35





Antibiotics)     


 


sulfamethoxazole Allergy  Other Verified 03/21/18 21:35





[From Bactrim]     


 


trimethoprim [From Bactrim] Allergy  Other Verified 03/21/18 21:35


 


lenalidomide [From Revlimid] AdvReac Mild Rash Verified 03/21/18 21:35











Home Medications: 


 Home Meds





Cholecalciferol (Vitamin D3) [Vitamin D3] 2,000 unit PO DAILY 12/27/13 [History]


Cyanocobalamin (Vitamin B-12) [B-12 Dots] 500 mcg PO MOTUWETHFR@0800 12/27/13 [

History]


Calcium Carbonate/Vitamin D3 [Calcium 600-Vit D3 400 Tablet] 1 tab PO BID@1200,

1800 12/01/15 [History]


Mirtazapine [Remeron] 15 mg PO BEDTIME 04/29/16 [History]


Phytonadione [Vitamin K] 100 mcg PO DAILY 04/29/16 [History]


Pyridoxine HCl [Vitamin B-6] 100 mg PO DAILY 04/29/16 [History]


Aspirin [Halfprin] 81 mg PO DAILY #90 tab.ec 05/05/17 [Rx]


Lactobacillus Acidophilus [Acidophilus Lactobacillus] 1 cap PO DAILY 05/09/17 [

History]


Levothyroxine Sodium [Synthroid] 125 mcg PO ACBREAKFAST 05/09/17 [History]


Melatonin/Pyridoxine HCl (B6) [Melatonin 3 mg Tablet] 3 mg PO BEDTIME PRN 05/09/ 17 [History]


Pantoprazole Sodium [Protonix] 20 mg PO ACBREAKFAST 06/04/17 [History]


Warfarin [Coumadin] 5 mg PO SUMOTUWETHSA@1800 07/21/17 [History]


predniSONE [Prednisone] 2 mg PO DAILY@1800 07/21/17 [History]


prednisoLONE Acetate [Pred Forte 1% Ophth Susp] 1 drop EYEBOTH BID 07/21/17 [

History]


Allopurinol [Zyloprim] 100 mg PO DAILY 11/21/17 [History]


Denosumab [Prolia] 60 mg SUBCUT ASDIRECTED 11/21/17 [History]


oxyCODONE 5 mg PO BID 11/21/17 [History]


Warfarin [Coumadin] 2.5 mg PO FR@1800 03/13/18 [History]


Acetaminophen 650 mg PO Q4H PRN 03/22/18 [History]


Cephalexin 500 mg PO BID 03/22/18 [History]


Deferasirox [Exjade] 1,000 mg PO DAILY@0600 03/22/18 [History]


Furosemide [Lasix] 40 mg PO Q48H 03/22/18 [History]


Potassium Chloride [Klor-Con M20] 10 meq PO Q48H 03/22/18 [History]


Urea [Urea] 1 applic TOP DAILY 03/22/18 [History]











Past Medical History


HEENT History: Reports: Cataract, Hard of Hearing, Impaired Vision, Sinusitis


Cardiovascular History: Reports: Blood Clots/VTE/DVT, Heart Murmur, High 

Cholesterol, PVD, SOB on Exertion, Other (See Below)


Other Cardiovascular History: on coumadin


Respiratory History: Reports: Bronchitis, Recurrent, PE, Pneumonia, Recurrent, 

SOB


Other Respiratory History: Chronic use of oxygen


Gastrointestinal History: Reports: Bowel Obstruction, Chronic Diarrhea, Fecal 

Incontinence, GERD


Genitourinary History: Reports: UTI, Recurrent, Other (See Below)


Other Genitourinary History: currnet dx of uti


OB/GYN History: Reports: Endometriosis, Pregnancy


Musculoskeletal History: Reports: Back Pain, Chronic, Fibromyalgia, Other (See 

Below)


Other Musculoskeletal History: POLYMYALGIA


Neurological History: Reports: Vertigo


Psychiatric History: Reports: Depression


Endocrine/Metabolic History: Reports: Hypothyroidism, Osteopenia, Osteoporosis


Hematologic History: Reports: Anemia, Anticoagulation Therapy, Blood Transfusion

(s), Other (See Below)


Other Hematologic History: Myelodysplastic Syndrome


Immunologic History: Reports: None


Oncologic (Cancer) History: Reports: None


Dermatologic History: Reports: None





- Infectious Disease History


Infectious Disease History: Reports: Chicken Pox


Other Infectious Disease History: Unable to assess.





- Past Surgical History


Head Surgeries/Procedures: Reports: None


HEENT Surgical History: Reports: Adenoidectomy, Cataract Surgery, Tonsillectomy


GI Surgical History: Reports: Appendectomy, Cholecystectomy, Colonoscopy, Other 

(See Below)


Endocrine Surgical History: Reports: None


Neurological Surgical History: Reports: None


Oncologic Surgical History: Reports: None





Social & Family History





- Family History


Family Medical History: Noncontributory


HEENT: Reports: None


Cardiac: Reports: None


Respiratory: Reports: None


GI: Reports: None


: Reports: Other (See Below)


OBGYN: Reports: None


Musculoskeletal: Reports: None


Neurological: Reports: None


Psychiatric: Reports: None


Endocrine/Metabolic: Reports: None


Hematologic: Reports: None


Immunologic: Reports: None


Dermatologic: Reports: None


Oncologic: Reports: Hodgkin's Lymphoma





- Tobacco Use


Smoking Status *Q: Never Smoker


Second Hand Smoke Exposure: No





- Caffeine Use


Caffeine Use: Reports: Coffee, Tea





- Alcohol Use


Days Per Week of Alcohol Use: 0





- Recreational Drug Use


Recreational Drug Use: No





H&P Review of Systems





- Review of Systems:


Review Of Systems: See Below


General: Reports: Fever, Malaise, Weakness, Fatigue, Diaphoresis


HEENT: Reports: No Symptoms


Pulmonary: Reports: Shortness of Breath (Jimbo shortness of breath, on home 

oxygen).  Denies: Cough


Cardiovascular: Denies: Chest Pain, Palpitations, Orthopnea, PND, Edema, Syncope


Gastrointestinal: Reports: No Symptoms


Genitourinary: Reports: No Symptoms


Musculoskeletal: Reports: No Symptoms


Skin: Reports: Bruising


Psychiatric: Denies: Confusion, Depression


Neurological: Reports: Pre-Existing Deficit, Difficulty Walking, Weakness, Gait 

Disturbance.  Denies: Confusion, Dizziness


Hematologic/Lymphatic: Reports: Anemia, Easy Bleeding


Immunologic: Reports: Other (Significant allergies)





Exam





- Exam


Exam: See Below





- Vital Signs


Vital Signs: 


 Last Vital Signs











Temp  99.5 F   03/22/18 05:54


 


Pulse  84   03/22/18 05:54


 


Resp  22 H  03/22/18 05:54


 


BP  115/67   03/22/18 05:54


 


Pulse Ox  95   03/22/18 05:54











Weight: 140 lb





- Exam


Quality Assessment: Supplemental Oxygen (Pox 95% on 3 L nasal cannula).  No: 

Urinary Catheter


General: Alert, Oriented, Cooperative, Lethargic


HEENT: Hearing Intact, Other (Dry mucous membranes).  No: Mucosa Moist & Pink


Neck: No: JVD


Lungs: Clear to Auscultation, Normal Respiratory Effort.  No: Crackles, Rales


Cardiovascular: Regular Rate, Regular Rhythm, Normal S1, Normal S2, Systolic 

Murmur


GI/Abdominal Exam: Normal Bowel Sounds, Soft.  No: Distended


Rectal (Female) Exam: Deferred


Back Exam: No: CVA Tenderness (L), CVA Tenderness (R)


Extremities: No: Pedal Edema


Skin: Warm, Dry, Intact


Neurological: Normal Speech


Neuro Extensive - Mental Status: Alert, Oriented x3, Normal Mood/Affect


Neuro Extensive - Motor, Sensory, Reflexes: CN II-XII Intact


Psychiatric: Alert, Normal Affect, Normal Mood





- Patient Data


Lab Results Last 24 hrs: 


 Laboratory Results - last 24 hr











  03/22/18 03/22/18 Range/Units





  06:15 06:15 


 


PT   18.5 H D  (8.9-11.4)  SEC


 


INR   1.8 H  (0.9-1.1)  


 


Troponin I  0.10 H*   (0.00-0.070)  ng/mL











Result Diagrams: 


 03/24/18 08:00





 03/24/18 16:15





*Q Meaningful Use (ADM)





- VTE *Q


VTE Criteria *Q: 








- Stroke *Q


Stroke Criteria *Q: 








- AMI *Q


AMI Criteria *Q: 





Problem List Initiated/Reviewed/Updated: Yes


Orders Last 24hrs: 


 Active Orders 24 hr











 Category Date Time Status


 


 Patient Status [ADT] Routine ADT  03/21/18 22:58 Ordered


 


 Oxygen Therapy [RC] QSHIFT Care  03/21/18 22:58 Active


 


 Up With Assistance [RC] DAILY Care  03/21/18 22:58 Active


 


 Vital Signs [RC] 0300,0700,1100,1500,1900,2300 Care  03/21/18 22:58 Active


 


 2 Gram Sodium Diet [DIET] Diet  03/22/18 Breakfast Active


 


 CULTURE URINE [RM] Stat Lab  03/21/18 22:35 Received


 


 Acetaminophen [Tylenol] Med  03/21/18 22:58 Active





 650 mg PO Q4H PRN   


 


 Ondansetron [Zofran] Med  03/21/18 22:58 Active





 4 mg IV Q6H PRN   


 


 Sodium Chloride 0.9% [Normal Saline] 1,000 ml Med  03/21/18 23:00 Active





 IV ASDIRECTED   


 


 Sodium Chloride 0.9% [Syrex Flush] Med  03/21/18 22:58 Active





 5 ml FLUSH Q8HR PRN   


 


 Peripheral IV Insertion Adult [OM.PC] Routine Oth  03/21/18 22:58 Ordered


 


 Resuscitation Status Routine Resus Stat  03/21/18 22:58 Ordered








 Medication Orders





Acetaminophen (Tylenol)  650 mg PO Q4H PRN


   PRN Reason: Pain (Mild 1-3)/fever


Sodium Chloride (Normal Saline)  1,000 mls @ 100 mls/hr IV ASDIRECTED STEFANO


   Last Admin: 03/22/18 06:02  Dose: 100 mls/hr


   Infusion: 03/22/18 06:02  Dose: 100 mls/hr


   Admin: 03/21/18 23:51  Dose: 100 mls/hr


Ondansetron HCl (Zofran)  4 mg IV Q6H PRN


   PRN Reason: Nausea/Vomiting


   Last Admin: 03/22/18 08:52  Dose: 4 mg


Sodium Chloride (Syrex Flush)  5 ml FLUSH Q8HR PRN


   PRN Reason: Keep Vein Open








Assessment/Plan Comment:: 


HISTORY OF PRESENT ILLNESS


84 YO WF who is a resident of a long-term care center was admitted last night 

through the ED complaining of generalized weakness with nausea/vomiting which 

began earlier that evening. She was diagnosed yesterday with UTI after she had 

developed a fever. She was placed on antibiotics however the patient stated 

after she ate she started feeling quite worse. Temp at NH documented 102.0. In 

the ED she denied any chest pain although her troponin was slightly elevated. 

Pt with history of chronic anemia due to Myelodysplastic disorder followed 

closely by hematology oncology requiring frequent weekly blood transfusions and 

has subsequently developed antibiotics. Hospice was entertained however the 

family has up to this point refused. She was given Levaquin and orders for 

transfusion 





Received 1 unit packed red blood cells on March 17





Pertinent ED findings 


Hemoglobin 6.3


Elevated cardio biomarkers


UTI


__________________________________________________________________________





Update, patient required further workup interventions after admission to the 

floor from the ED such as medication review and reconciliation, workup for 

possible delayed blood transfusion reaction





Hospital Diagnosis 


Anemia


Urinary tract infection


Profound weakness


Questionable delayed immune-mediated transfusion reaction 








Chronic medical conditions


Hypothyroidism


MDS


Hypertension


HFpEF


History of MI


CAD


HLD insomnia


History of DVTs and PE


GERD


Renal insufficiency


Polymalgia rheumatica. 


Depression


GCA


Lumbago with compression fractures


Hx of iron overload


Hx Gout








PLAN: 





HEMATOLOGY:  Refractory anemia due to myelodysplastic syndrome, Hgb/Hct 6.3/

19.3 RBC 2.2, WBC 5.4 with neutrophilia, Questionable delayed immune mediated 

transfusing reaction--specially since receiving incompatibility/developing 

antibodies, order haptoglobin/Retic today will require blood transfusion 

specially in the settings of elevated cardio biomarkers--however with 

antibodies could delay transfusion. Currently hospital has standing orders from 

oncologist regarding transfusions 





PULMONARY: POX 95% to L/NC lungs CTA,  





CV: Troponin slightly elevated, likely reciprocal/inflammatory however likely 

no intervention since denies CP, EKG NSR with older inferior lead changes, 

Stable combined congestive heart failure. Ejection fraction 75% with normal 

left ventricle size, hyperdynamic systolic function, and mild diastolic 

dysfunction  gentle balance between diuretics and fluids as she is dehydrated 

today, On chronic anticoagulation with con-conmittent vitamin K, history of PE/

DVT, Cont with ASA, INR 1.8, 





FLUIDS, ELECTROLYTES, AND RENAL:  Exam demonstrates prerenal condition BUN/

creatinine ratio 24:1 continue IV fluids for now, reduce this evening.  gentle 

IV fluids, oral intake down due to nausea, sodium, poassium normal. Calcium 8.6 





ENDOCRINE:  Acquired hypothyroidism, on thyroid replacement therapy





GI: No vomiting, however nausea, Zofran, IV fluids. PPI therapy, slow fluids 

down tonight





INFECTIOUS DISEASE: Immunocompromised host, UTI symptoms, continue renal-dose 

Levaquin decreased WBC, on chronic steroids, fever improving, MAP adequate.  On 

probiotic





HEALTH MAINTENANCE:  On PPI for GERD, anticoagulation,  melatonin daily at 

bedtime





CODE STATUS, DO NOT RESUSCITATE, has refused hospice referral previous admission








OVERALL PLAN: Continue inpatient stay, transfusion upon hospital availability, 

change fluids rate tonight, IV antibiotics, patient DNR status, add haptoglobin/

reticulocyte count, may need work-up for possible delay transfusion reaction

## 2018-03-23 RX ADMIN — Medication SCH TAB: at 12:00

## 2018-03-23 RX ADMIN — VITAMIN D, TAB 1000IU (100/BT) SCH UNITS: 25 TAB at 08:07

## 2018-03-23 RX ADMIN — LEVOFLOXACIN SCH MLS/HR: 5 INJECTION, SOLUTION INTRAVENOUS at 15:52

## 2018-03-23 RX ADMIN — CYANOCOBALAMIN TAB 500 MCG SCH MCG: 500 TAB at 08:06

## 2018-03-23 RX ADMIN — CARVEDILOL SCH EACH: 12.5 TABLET, FILM COATED ORAL at 06:01

## 2018-03-23 RX ADMIN — Medication SCH MG: at 08:08

## 2018-03-23 RX ADMIN — OMEPRAZOLE SCH MG: 20 CAPSULE, DELAYED RELEASE ORAL at 07:42

## 2018-03-23 RX ADMIN — Medication SCH TAB: at 18:04

## 2018-03-23 NOTE — PCM.PN
- General Info


Date of Service: 03/23/18


Functional Status: Reports: Tolerating Diet.  Denies: New Symptoms





- Review of Systems


General: Reports: Weakness


HEENT: Reports: No Symptoms


Pulmonary: Reports: Shortness of Breath (Chronic shortness of breath).  Denies: 

Cough, Sputum, Wheezing


Cardiovascular: Denies: Chest Pain, Palpitations, Orthopnea, Edema


Gastrointestinal: Reports: No Symptoms


Genitourinary: Reports: No Symptoms


Skin: Reports: Pallor


Neurological: Reports: Weakness, Gait Disturbance.  Denies: Dizziness, Pre-

Existing Deficit, Change in Speech


Psychiatric: Reports: Mood Lability





- Patient Data


Vitals - Most Recent: 


 Last Vital Signs











Temp  99.0 F   03/23/18 06:29


 


Pulse  82   03/23/18 06:29


 


Resp  20   03/23/18 06:29


 


BP  95/50 L  03/23/18 06:29


 


Pulse Ox  91 L  03/23/18 06:29











Weight - Most Recent: 145 lb 4.8 oz


I&O - Last 24 Hours: 


 Intake & Output











 03/22/18 03/23/18 03/23/18





 22:59 06:59 14:59


 


Intake Total 1251 360 


 


Output Total 600 350 


 


Balance 651 10 











Lab Results Last 24 Hours: 


 Laboratory Results - last 24 hr











  03/22/18 03/22/18 Range/Units





  06:15 06:15 


 


Absolute Retic  0.0159 L   (0.0200-0.1000)  


 


Percent Retic  0.7   (0.3-2.2)  %


 


Haptoglobin   139  ()  mg/dL











Med Orders - Current: 


 Current Medications





Acetaminophen (Tylenol)  650 mg PO Q4H PRN


   PRN Reason: Pain


Allopurinol (Zyloprim)  100 mg PO DAILY [facility]


   Last Admin: 03/23/18 08:07 Dose:  100 mg


Aspirin (Halfprin)  81 mg PO DAILY [facility]


   Last Admin: 03/23/18 08:06 Dose:  81 mg


Calcium Citrate (Calcium Citrate + D)  2 tab PO BID@1200,1800 [facility]


   Last Admin: 03/22/18 17:41 Dose:  1 tab


Cholecalciferol (Vitamin D3)  2,000 units PO DAILY [facility]


   Last Admin: 03/23/18 08:07 Dose:  2,000 units


Cyanocobalamin (Vitamin B12)  500 mcg PO MOTUWETHFR@0800 [facility]


   Last Admin: 03/23/18 08:06 Dose:  500 mcg


Sodium Chloride (Normal Saline)  1,000 mls @ 100 mls/hr IV ASDIRECTED [facility]


   Last Admin: 03/22/18 15:32 Dose:  100 mls/hr


Sodium Chloride (Normal Saline)  1,000 mls @ 40 mls/hr IV ASDIRECTED [facility]


   Last Admin: 03/22/18 20:04 Dose:  40 mls/hr


Levofloxacin/Dextrose (Levaquin In D5w 250 Mg/50 Ml)  50 mls @ 50 mls/hr IV 

Q24H [facility]


   Last Admin: 03/22/18 16:13 Dose:  50 mls/hr


Lactobacillus Acidophilus/Rhamnosus (Multi-Cynthia Plus)  1 cap PO DAILY [facility]


   Last Admin: 03/23/18 08:07 Dose:  1 cap


Levothyroxine Sodium (Synthroid)  125 mcg PO ACBREAKFAST [facility]


   Last Admin: 03/23/18 07:40 Dose:  125 mcg


Melatonin (Melatonin)  3 mg PO BEDTIME PRN


   PRN Reason: Insomnia


Mirtazapine (Remeron)  15 mg PO BEDTIME [facility]


   Last Admin: 03/22/18 20:31 Dose:  15 mg


Omeprazole (Omeprazole)  20 mg PO ACBREAKFAST [facility]


   Last Admin: 03/23/18 07:42 Dose:  20 mg


Ondansetron HCl (Zofran)  4 mg IVPUSH Q6H PRN


   PRN Reason: Nausea/Vomiting


Oxycodone HCl (Oxycodone)  5 mg PO BID [facility]


   Last Admin: 03/23/18 08:09 Dose:  5 mg


Deferasirox [Exjade] (500 Mg Tab)  2 each PO DAILY@0600 [facility]


   Last Admin: 03/23/18 06:01 Dose:  2 each


Phytonadione (Vitamin K)  100 mcg PO DAILY [facility]


   Last Admin: 03/23/18 08:08 Dose:  100 mcg


Prednisolone Acetate (Pred Forte 1% Ophth Susp)  0 ml EYEBOTH BID [facility]


   Last Admin: 03/23/18 08:08 Dose:  1 drop


Prednisone (Prednisone)  2 mg PO DAILY@1800 [facility]


   Last Admin: 03/22/18 17:42 Dose:  2 mg


Pyridoxine HCl (Vitamin B6-Pyridoxine)  100 mg PO DAILY [facility]


   Last Admin: 03/23/18 08:08 Dose:  100 mg


Sodium Chloride (Syrex Flush)  5 ml FLUSH Q8HR PRN


   PRN Reason: Keep Vein Open


Urea (Urea 20% Crm)  0 gm TOP DAILY [facility]


   Last Admin: 03/23/18 08:08 Dose:  1 applic


Warfarin Sodium (Coumadin)  2.5 mg PO FR@1800 [facility]


Warfarin Sodium (Coumadin)  5 mg PO SUMOTUWETHSA@1800 [facility]


   Last Admin: 03/22/18 17:42 Dose:  5 mg





Discontinued Medications





Acetaminophen (Tylenol)  650 mg PO Q4H PRN


   PRN Reason: Pain (Mild 1-3)/fever


Sodium Chloride (Normal Saline)  1,000 mls @ 150 mls/hr IV ASDIRECTED [facility]


   Last Infusion: 03/21/18 23:40 Dose:  100 mls/hr


Levofloxacin/Dextrose 500 mg/ (Premix)  100 mls @ 100 mls/hr IV ONETIME ONE


   Stop: 03/22/18 00:01


   Last Admin: 03/21/18 23:43 Dose:  100 mls/hr


Sodium Chloride (Normal Saline)  250 mls @ 999 mls/hr IV ONETIME ONE


   Stop: 03/22/18 11:45


   Last Admin: 03/22/18 11:30 Dose:  999 mls/hr


Ondansetron HCl (Zofran)  4 mg IV Q6H PRN


   PRN Reason: Nausea/Vomiting


   Last Admin: 03/22/18 08:52 Dose:  4 mg











- Exam


Quality Assessment: Supplemental Oxygen


General: Alert, Oriented, No Acute Distress


Neck: Supple


Lungs: Other (Fibrotic crackles chronic)


Cardiovascular: Regular Rhythm, Murmurs


GI/Abdominal Exam: No Distention


Extremities: No Pedal Edema


Skin: Warm, Dry, Intact


Psy/Mental Status: Alert, Normal Affect, Normal Mood





- Problem List Review


Problem List Initiated/Reviewed/Updated: Yes





- My Orders


Last 24 Hours: 


My Active Orders





03/22/18 10:40


Acetaminophen [Tylenol]   650 mg PO Q4H PRN 





03/22/18 11:00


Patient's Own Medication [Ptom]   2 each PO DAILY@0600 





03/22/18 11:30


Allopurinol [Zyloprim]   100 mg PO DAILY 


Aspirin [Halfprin]   81 mg PO DAILY 


B.Bif/B.Long/L.Acidoph/L.Rhamn [Multi-Cynthia Plus]   1 cap PO DAILY 


Cholecalciferol (Vitamin D3) [Vitamin D3]   2,000 units PO DAILY 


Cyanocobalamin (Vitamin B12) [Vitamin B12]   500 mcg PO MOTUWETHFR@0800 


Levothyroxine [Synthroid]   125 mcg PO ACBREAKFAST 


Omeprazole   20 mg PO ACBREAKFAST 


Phytonadione [Vitamin K]   100 mcg PO DAILY 


Urea [Urea 20% Crm]   0 gm TOP DAILY 


Vitamin B6-pyridOXINE   100 mg PO DAILY 


prednisoLONE Acetate [Pred Forte 1% Ophth Susp]   0 ml EYEBOTH BID 





03/22/18 11:45


oxyCODONE   5 mg PO BID 





03/22/18 12:00


Calcium Citrate/Vitamin D3 [Calcium Citrate + D]   2 tab PO BID@1200,1800 





03/22/18 16:00


Levofloxacin/Dextrose 5%-Water [Levaquin in D5W 250 MG/50 ML] 50 ml IV Q24H 





03/22/18 18:00


Warfarin [Coumadin]   5 mg PO SUMOTUWETHSA@1800 


predniSONE   2 mg PO DAILY@1800 





03/22/18 20:00


Sodium Chloride 0.9% [Normal Saline] 1,000 ml IV ASDIRECTED 





03/22/18 21:00


Melatonin   3 mg PO BEDTIME PRN 


Mirtazapine [Remeron]   15 mg PO BEDTIME 





03/23/18 18:00


Warfarin [Coumadin]   2.5 mg PO FR@1800 














- Plan


Plan:: 


HISTORY OF PRESENT ILLNESS


82 YO WF who is a resident of a long-term care center was admitted last night 

through the ED complaining of generalized weakness with nausea/vomiting which 

began earlier that evening. She was diagnosed yesterday with UTI after she had 

developed a fever. She was placed on antibiotics however the patient stated 

after she ate she started feeling quite worse. Temp at NH documented 102.0. In 

the ED she denied any chest pain although her troponin was slightly elevated. 

Pt with history of chronic anemia due to Myelodysplastic disorder followed 

closely by hematology oncology requiring frequent weekly blood transfusions and 

has subsequently developed antibiotics. Hospice was entertained however the 

family has up to this point refused. She was given Levaquin and orders for 

transfusion 





Received 1 unit packed red blood cells on March 17





Pertinent ED findings 


Hemoglobin 6.3


Elevated cardio biomarkers


UTI


__________________________________________________________________________





Update, still awaiting initial blood transfusion, Long discussion with [facility] lab 

pathologist on phone yesterday regarding possible delayed immune mediated 

transfusion reaction. She noted strong possibility however she will work 

patient up for this.  She also noted ongoing difficulty/complexities regarding 

autoantibodies making it progressively difficult future transfusions including 

further delays.  








Hospital Diagnosis 


Anemia, retic demonstrating hypoproliferative state, normal haptoglobin, 


Urinary tract infection


Profound weakness


Questionable delayed immune-mediated transfusion reaction 








Chronic medical conditions


Hypothyroidism


MDS


Hypertension


HFpEF


History of MI


CAD


HLD insomnia


History of DVTs and PE


GERD


Renal insufficiency


Polymalgia rheumatica. 


Depression


GCA


Lumbago with compression fractures


Hx of iron overload


Hx Gout








PLAN: 





HEMATOLOGY:  Refractory anemia due to myelodysplastic syndrome, Hgb/Hct 6.3/

19.3 RBC 2.2, WBC 5.4 with neutrophilia, Questionable delayed immune mediated 

transfusing reaction--specially since receiving incompatibility/developing 

antibodies, order haptoglobin/Retic today will require blood transfusion 

specially in the settings of elevated cardio biomarkers--however with 

antibodies could delay transfusion. Currently hospital has standing orders from 

oncologist regarding transfusions.  Low retic demonstrating hypoproliferative 

state--concerning. normal haptoglobin, will add LDH 





PULMONARY: POX 95% to L/NC lungs chronic fibrotic crackles,   





CV: Troponin slightly elevated, likely reciprocal/inflammatory, denies CP, EKG 

NSR with older inferior lead changes, Stable combined congestive heart failure. 

Ejection fraction 75% with normal left ventricle size, hyperdynamic systolic 

function, and mild diastolic dysfunction, yesterday there was gentle balance 

between diuretics and fluids as her initial prerenal picture. However will 

saline lock fluids today as long as she can tolerate orals. Continue holding 

Lasix for now. On chronic anticoagulation with con-conmittent vitamin K, 

history of PE/DVT, Cont with ASA, INR 1.8, ongoing hypotension





FLUIDS, ELECTROLYTES, AND RENAL:  Exam demonstrates proved in prerenal condition

, BUN/creatinine ratio 24:1 saline lock IV. PO intake improving, sodium, 

poassium normal. Calcium 8.6.  T holding Lasix but may need to restart gently 

soon





ENDOCRINE:  Acquired hypothyroidism, on thyroid replacement therapy





GI: No vomiting, proved in nausea, Zofran, saline lock IV, PPI therapy, 





INFECTIOUS DISEASE: Immunocompromised host, no UTI symptoms, continue renal-

dose Levaquin,decreased WBC, on chronic steroids, no fever,  MAP minimal,   On 

probiotic





HEALTH MAINTENANCE:  On PPI for GERD, anticoagulation,  melatonin daily at 

bedtime





CODE STATUS, DO NOT RESUSCITATE, has refused hospice referral previous admission








OVERALL PLAN: Continue inpatient stay, transfusion upon hospital availability, 

saline lock IVs, labs a.m., patient DNR status, add haptoglobin/reticulocyte 

count, Ongoing w/u for possible delayed immune-mediated transfusion reaction.  

Discussion with patient on what this means and the future difficulty/delay of 

blood transfusions.

## 2018-03-24 LAB
CHLORIDE SERPL-SCNC: 105 MMOL/L (ref 98–115)
CHLORIDE SERPL-SCNC: 107 MMOL/L (ref 98–115)
SODIUM SERPL-SCNC: 142 MMOL/L (ref 136–145)
SODIUM SERPL-SCNC: 143 MMOL/L (ref 136–145)

## 2018-03-24 RX ADMIN — DOPAMINE HYDROCHLORIDE IN DEXTROSE SCH MLS/HR: 1.6 INJECTION, SOLUTION INTRAVENOUS at 19:05

## 2018-03-24 RX ADMIN — Medication SCH: at 13:34

## 2018-03-24 RX ADMIN — ONDANSETRON PRN MG: 2 INJECTION, SOLUTION INTRAMUSCULAR; INTRAVENOUS at 08:02

## 2018-03-24 RX ADMIN — Medication SCH: at 10:24

## 2018-03-24 RX ADMIN — VITAMIN D, TAB 1000IU (100/BT) SCH: 25 TAB at 10:24

## 2018-03-24 RX ADMIN — CARVEDILOL SCH EACH: 12.5 TABLET, FILM COATED ORAL at 06:14

## 2018-03-24 RX ADMIN — Medication SCH: at 19:34

## 2018-03-24 RX ADMIN — OMEPRAZOLE SCH MG: 20 CAPSULE, DELAYED RELEASE ORAL at 08:00

## 2018-03-24 NOTE — PCM.PN
- General Info


Date of Service: 03/24/18


Subjective Update: 





Patient's daughter at bedside during rounds. She notes that patient's breathing 

improved until about 6 am and then it became labored again. 


Functional Status: Reports: Pain Controlled, Urinating.  Denies: Tolerating Diet


Pain Score: 0





- Review of Systems


General: Reports: Weakness, Fatigue, Malaise.  Denies: Fever, Chills, Appetite


Pulmonary: Reports: Shortness of Breath, Cough


Cardiovascular: Denies: Chest Pain, Edema


Neurological: Denies: Confusion


Psychiatric: Denies: Anxiety





- Patient Data


Vitals - Most Recent: 


 Last Vital Signs











Temp  100.3 F   03/24/18 09:51


 


Pulse  100   03/24/18 09:45


 


Resp  28 H  03/24/18 09:45


 


BP  131/79   03/24/18 09:45


 


Pulse Ox  96   03/24/18 09:45











Weight - Most Recent: 140 lb 12.8 oz


I&O - Last 24 Hours: 


 Intake & Output











 03/23/18 03/24/18 03/24/18





 22:59 06:59 14:59


 


Intake Total 150 150 


 


Output Total  2000 


 


Balance 150 -1850 











Lab Results Last 24 Hours: 


 Laboratory Results - last 24 hr











  03/23/18 03/23/18 03/24/18 Range/Units





  23:30 23:30 08:00 


 


WBC   7.2  5.2  (5.0-10.0)  10^3/uL


 


RBC   2.60 L  2.31 L  (3.80-5.50)  10^6/uL


 


Hgb   7.3 L  6.6 L*  (12.0-16.0)  g/dL


 


Hct   22.9 L  20.0 L  (37.0-47.0)  %


 


MCV   87.9  86.6  (82.0-92.0)  fL


 


MCH   27.9  28.6  (27.0-31.0)  pg


 


MCHC   31.8 L  33.0  (32.0-36.0)  g/dL


 


RDW   16.1 H  16.4 H  (11.5-14.5)  %


 


Plt Count   224  166  (150-300)  10^3/uL


 


MPV   10.5 H  10.1  (7.4-10.4)  fL


 


Neut % (Auto)   72.0 H  68.5  (50.0-70.0)  %


 


Lymph % (Auto)   17.7 L  22.5  (20.0-40.0)  %


 


Mono % (Auto)   2.3  3.9  (2.0-8.0)  %


 


Eos % (Auto)   7.6 H  4.8 H  (1.0-3.0)  %


 


Baso % (Auto)   0.4  0.3  (0.0-1.0)  %


 


Neut # (Auto)   5.2  3.6  (2.5-7.0)  10^3/uL


 


Lymph # (Auto)   1.3  1.2  (1.0-4.0)  10^3/uL


 


Mono # (Auto)   0.2  0.2  (0.1-0.8)  10^3/uL


 


Eos # (Auto)   0.5 H  0.2  (0.1-0.3)  10^3/uL


 


Baso # (Auto)   0.0  0.0  (0.0-0.1)  10^3/uL


 


Sodium  143    (136-145)  mmol/L


 


Potassium  3.6    (3.3-5.3)  mmol/L


 


Chloride  107    ()  mmol/L


 


Carbon Dioxide  23.4    (21.0-32.0)  mmol/L


 


BUN  25    (6-25)  mg/dL


 


Creatinine  1.23 H    (0.51-1.17)  mg/dL


 


Est Cr Clr Drug Dosing  29.93    mL/min


 


Estimated GFR (MDRD)  42    mL/min


 


Glucose  166 H    ()  mg/dL


 


Calcium  9.3    (8.7-10.3)  mg/dL


 


Total Bilirubin  0.6    (0.2-1.0)  mg/dL


 


AST  29    (15-37)  U/L


 


ALT  43    (12-78)  U/L


 


Alkaline Phosphatase  73    ()  IU/L


 


Troponin I  0.24 H*    (0.00-0.070)  ng/mL


 


B-Natriuretic Peptide  1210 H    (0-100)  pg/mL


 


Total Protein  6.7    (6.4-8.2)  g/dL


 


Albumin  3.31    (3.00-4.80)  g/dL














  03/24/18 Range/Units





  08:00 


 


WBC   (5.0-10.0)  10^3/uL


 


RBC   (3.80-5.50)  10^6/uL


 


Hgb   (12.0-16.0)  g/dL


 


Hct   (37.0-47.0)  %


 


MCV   (82.0-92.0)  fL


 


MCH   (27.0-31.0)  pg


 


MCHC   (32.0-36.0)  g/dL


 


RDW   (11.5-14.5)  %


 


Plt Count   (150-300)  10^3/uL


 


MPV   (7.4-10.4)  fL


 


Neut % (Auto)   (50.0-70.0)  %


 


Lymph % (Auto)   (20.0-40.0)  %


 


Mono % (Auto)   (2.0-8.0)  %


 


Eos % (Auto)   (1.0-3.0)  %


 


Baso % (Auto)   (0.0-1.0)  %


 


Neut # (Auto)   (2.5-7.0)  10^3/uL


 


Lymph # (Auto)   (1.0-4.0)  10^3/uL


 


Mono # (Auto)   (0.1-0.8)  10^3/uL


 


Eos # (Auto)   (0.1-0.3)  10^3/uL


 


Baso # (Auto)   (0.0-0.1)  10^3/uL


 


Sodium   (136-145)  mmol/L


 


Potassium   (3.3-5.3)  mmol/L


 


Chloride   ()  mmol/L


 


Carbon Dioxide   (21.0-32.0)  mmol/L


 


BUN   (6-25)  mg/dL


 


Creatinine   (0.51-1.17)  mg/dL


 


Est Cr Clr Drug Dosing   mL/min


 


Estimated GFR (MDRD)   mL/min


 


Glucose   ()  mg/dL


 


Calcium   (8.7-10.3)  mg/dL


 


Total Bilirubin   (0.2-1.0)  mg/dL


 


AST   (15-37)  U/L


 


ALT   (12-78)  U/L


 


Alkaline Phosphatase   ()  IU/L


 


Troponin I  2.93 H*  (0.00-0.070)  ng/mL


 


B-Natriuretic Peptide   (0-100)  pg/mL


 


Total Protein   (6.4-8.2)  g/dL


 


Albumin   (3.00-4.80)  g/dL











Med Orders - Current: 


 Current Medications





Acetaminophen (Tylenol)  650 mg PO Q4H PRN


   PRN Reason: Pain


   Last Admin: 03/24/18 09:51 Dose:  650 mg


Allopurinol (Zyloprim)  100 mg PO DAILY [facility]


   Last Admin: 03/24/18 10:17 Dose:  100 mg


Aspirin (Halfprin)  81 mg PO DAILY [facility]


   Last Admin: 03/24/18 10:17 Dose:  81 mg


Calcium Citrate (Calcium Citrate + D)  2 tab PO BID@1200,1800 [facility]


   Last Admin: 03/23/18 18:04 Dose:  1 tab


Cholecalciferol (Vitamin D3)  2,000 units PO DAILY [facility]


   Last Admin: 03/24/18 10:24 Dose:  Not Given


Cyanocobalamin (Vitamin B12)  500 mcg PO MOTUWETHFR@0800 [facility]


   Last Admin: 03/23/18 08:06 Dose:  500 mcg


Lactobacillus Acidophilus/Rhamnosus (Multi-Cynthia Plus)  1 cap PO DAILY [facility]


   Last Admin: 03/24/18 10:24 Dose:  Not Given


Levothyroxine Sodium (Synthroid)  125 mcg PO ACBREAKFAST [facility]


   Last Admin: 03/24/18 08:00 Dose:  125 mcg


Melatonin (Melatonin)  3 mg PO BEDTIME PRN


   PRN Reason: Insomnia


   Last Admin: 03/23/18 22:14 Dose:  3 mg


Mirtazapine (Remeron)  15 mg PO BEDTIME [facility]


   Last Admin: 03/23/18 20:47 Dose:  15 mg


Omeprazole (Omeprazole)  20 mg PO ACBREAKFAST [facility]


   Last Admin: 03/24/18 08:00 Dose:  20 mg


Ondansetron HCl (Zofran)  4 mg IVPUSH Q6H PRN


   PRN Reason: Nausea/Vomiting


   Last Admin: 03/24/18 08:02 Dose:  4 mg


Oxycodone HCl (Oxycodone)  5 mg PO BID [facility]


   Last Admin: 03/24/18 10:21 Dose:  5 mg


Deferasirox [Exjade] (500 Mg Tab)  2 each PO DAILY@0600 [facility]


   Last Admin: 03/24/18 06:14 Dose:  2 each


Phytonadione (Vitamin K)  100 mcg PO DAILY [facility]


   Last Admin: 03/24/18 10:18 Dose:  100 mcg


Prednisolone Acetate (Pred Forte 1% Ophth Susp)  0 ml EYEBOTH BID [facility]


   Last Admin: 03/24/18 10:03 Dose:  Not Given


Prednisone (Prednisone)  2 mg PO DAILY@1800 [facility]


   Last Admin: 03/23/18 18:04 Dose:  2 mg


Pyridoxine HCl (Vitamin B6-Pyridoxine)  100 mg PO DAILY [facility]


   Last Admin: 03/24/18 10:24 Dose:  Not Given


Sodium Chloride (Syrex Flush)  5 ml FLUSH Q8HR PRN


   PRN Reason: Keep Vein Open


Urea (Urea 20% Crm)  0 gm TOP DAILY [facility]


   Last Admin: 03/24/18 10:18 Dose:  1 applic


Warfarin Sodium (Coumadin)  2.5 mg PO FR@1800 [facility]


   Last Admin: 03/23/18 18:04 Dose:  2.5 mg


Warfarin Sodium (Coumadin)  5 mg PO SUMOTUWETHSA@1800 [facility]


   Last Admin: 03/22/18 17:42 Dose:  5 mg





Discontinued Medications





Acetaminophen (Tylenol)  650 mg PO Q4H PRN


   PRN Reason: Pain (Mild 1-3)/fever


Furosemide (Lasix)  40 mg IVPUSH NOW ONE


   Stop: 03/23/18 23:11


   Last Admin: 03/23/18 23:14 Dose:  40 mg


Furosemide (Lasix)  20 mg IVPUSH NOW ONE


   Stop: 03/24/18 09:33


   Last Admin: 03/24/18 10:11 Dose:  20 mg


Sodium Chloride (Normal Saline)  1,000 mls @ 150 mls/hr IV [facility]


   Last Infusion: 03/21/18 23:40 Dose:  100 mls/hr


Levofloxacin/Dextrose 500 mg/ (Premix)  100 mls @ 100 mls/hr IV ONETIME ONE


   Stop: 03/22/18 00:01


   Last Admin: 03/21/18 23:43 Dose:  100 mls/hr


Sodium Chloride (Normal Saline)  1,000 mls @ 100 mls/hr IV [facility]


   Last Admin: 03/22/18 15:32 Dose:  100 mls/hr


Sodium Chloride (Normal Saline)  1,000 mls @ 40 mls/hr IV [facility]


   Last Admin: 03/22/18 20:04 Dose:  40 mls/hr


Sodium Chloride (Normal Saline)  250 mls @ 999 mls/hr IV ONETIME ONE


   Stop: 03/22/18 11:45


   Last Admin: 03/22/18 11:30 Dose:  999 mls/hr


Levofloxacin/Dextrose (Levaquin In D5w 250 Mg/50 Ml)  50 mls @ 50 mls/hr IV 

Q24H [facility]


   Last Admin: 03/23/18 15:52 Dose:  50 mls/hr


Lorazepam (Ativan)  0.25 mg IVPUSH ONETIME ONE


   Stop: 03/23/18 22:46


   Last Admin: 03/23/18 22:59 Dose:  0.25 mg


Lorazepam (Ativan) Confirm Administered Dose 2 mg .ROUTE .STK-MED ONE


   Stop: 03/23/18 22:49


   Last Admin: 03/23/18 23:00 Dose:  Not Given


Ondansetron HCl (Zofran)  4 mg IV Q6H PRN


   PRN Reason: Nausea/Vomiting


   Last Admin: 03/22/18 08:52 Dose:  4 mg


Sodium Chloride (Syrex Flush)  5 ml FLUSH Q8HR PRN


   PRN Reason: Keep Vein Open











- Exam


Quality Assessment: Supplemental Oxygen (97% at 3 liters per nasal cannula), 

Urine Catheter (clear yellow returns), DVT Prophylaxis (On warfarin)


General: Alert, Oriented, Cooperative, Mild Distress


Neck: JVD (mild to moderate JVD)


Lungs: Other (effort labored with slight retractions noted, lung fields coarse 

throughout).  No: Wheezing


Cardiovascular: Regular Rate, No Murmurs, Tachycardia ()


GI/Abdominal Exam: Soft, Non-Tender


Extremities: No Pedal Edema.  No: Mottled


Skin: Warm, Dry, Other (ashen )


Neurological: Normal Speech


Psy/Mental Status: Alert, Normal Affect, Normal Mood.  No: Anxious





EKG INTERPRETATION


EKG Date: 03/23/18


Time: 22:59


Rhythm: Other (Sinus tachycardia)


Rate (Beats/Min): 116


Axis: LAD-Left Axis Deviation


P-Wave: Present


QRS: Normal


ST-T: Depressed (slight depression in V5, otherwise no changes)


QT: Normal


Comparison: Change From Previous EKG





- Problem List Review


Problem List Initiated/Reviewed/Updated: Yes





- My Orders


Last 24 Hours: 


My Active Orders





03/23/18 22:47


Chest 2V [CR] Routine 


EKG 12 Lead [EK] Routine 





03/23/18 22:48


EKG Documentation Completion [RC] ASDIRECTED 





03/23/18 23:11


Urinary Catheter Assessment [RC] ASDIRECTED 





03/23/18 23:15


Heard Catheter Insertion [Insert Urinary Catheter] [OM.PC] Q24H 





03/23/18 23:32


Telemetry Monitoring [Cardiac Monitoring] [RC] Q4H 





03/23/18 23:33


Chest 1V Frontal [CR] Routine 





03/24/18 08:12


Daily Weight [Height and Weight] [RC] 0700 


Intake and Output Strict [RC] 0600,1400,2200 





03/24/18 16:00


BASIC METABOLIC PANEL,BMP [CHEM] Routine 


TROPONIN I [CHEM] Routine 














- Plan


Plan:: 





HPI: This is an 83 year old female who is a resident of a long-term care 

facility that was admitted through the ED for complaints of generalized 

weakness with nausea and vomiting that began earlier that evening. She was 

diagnosed the day prior with a UTI and placed on antibiotics. She reported that 

she felt worse after starting the antibiotics and was found to have a 

temperature of 102.0 F. Patient was found to have a slightly elevated troponin 

in the ED, but denied chest pain. The patient has a history of chronic anemia 

due to myelodysplastic syndrome and is followed closely by hematology/oncology. 

She has been requiring frequent weekly blood transfusions and has developed 

subsequent antibodies. She last received 1 unit of PRBCs on 3/17/18. Her 

hemoglobin in the ER was 6.3. 





________________________________________________________________________________

_____________________________________________________





Update: Call received from nursing last evening around 2200 with concerns of 

patient having chest pain, shortness of breath and worsening lung sounds. 

Orders given at that time for CBC, CMP, troponin, BNP, EKG, chest x-ray and 

place on telemetry. She was given a one time dose of lasix 40 mg IV, which 

improved her symptoms significantly. She had been placed on oxygen via nasal 

cannula at that time as well. Patient had her daughter at the bedside. 





PRIMARY ASSESSMENT/PLAN: 





Cardiogenic pulmonary edema secondary to acute NSTEMI from probable demand 

ischemia. BNP 1210. Troponin 0.24 with repeat this morning of 2.93. EKG 

revealing sinus tachycardia with ST depression in lead V5, however no other ST 

segment changes noted. Telemetry revealing heart rate of  bpm in sinus. 

Chest x-ray revealed interstitial edema and bibasilar airspace disease. Heard 

catheter in place with 2000 mL out in 8 hour period after first lasix dose. 

Will gently diurese with lasix 20 mg IV x 1 now. If needed, we can repeat a 20 

mg IV dose in a few hours. Potassium 3.6. Continue I & O. Weight down 5 pounds, 

which is back to baseline. Continue daily weights. Patient is on warfarin. Will 

hold off on further medical management of the MI at this time that would 

include IV heparin, beta-blocker, etc as long discussion held with patient and 

her daughter regarding her fragile condition and their wish to hold off. She is 

a DNR/DNI, however did visit with them about possibly changing her to comfort 

cares. They would like some time to discuss this. Repeat troponin at 1600. 





Acute on chronic combined congestive heart failure exacerbation. Plan as noted 

above. ECHO (2015) systolic function hyperdynamic, EF 75%, mild diastolic 

dysfunction. 





Hypotension, borderline. /60. Will continue to monitor this closely given 

gentle diuresing.





CKD stage 3, stable. Creatinine 1.23, GFR 42. Repeat BMP at 1600. 





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 Hgb 6.6. There is 1 unit of 

PRBCs available in [place] that is compatible. Will have unit transferred here, 

however given her current fragile state will not give blood at this time for 

concern of worsening current fluid overload status. Discussed with patient and 

daughter that after this transfusion they and their hematologist/oncologist 

will need to decide if it is worth risking transfusing with possible antibodies 

present. 





UTI, resolved. Urine culture showing no growth. Will discontinue IV levaquin as 

to not stress the kidneys or worsen fluid overload. Will continue to monitor 

for return of infection. 





Generalized weakness. 





Questionable delayed immune-mediated transfusion reaction. 





SECONDARY ASSESSMENT/PLAN: 





History of DVTs and PE. INR at 1600. 





History of hypertension. 





History of MI. 





Coronary artery disease. 





History of hyperlipidemia. 





Insomnia. Continue melatonin and mirtazipine.  





GERD. Continue omeprazole. 





Depression. 





Giant cell arteritis. 





Lumbago with compression fractures. 





History of gout.





History of iron overload. 





Polymyalgia rheumatica. 





DVT prophylaxis. On warfarin. 





Overall treatment plan: As mentioned above, long discussion held with patient 

and daughter regarding her current fragile condition. Will give one time dose 

of IV lasix and repeat labs at 1600. No transfusion of blood today given 

fragile state, but will get the blood here in case it is able to be given. Will 

continue to monitor intake & output along with cardiac and respiratory status. 

Discussed with patient and family that at any time they decide they would like 

her to be comfort cares only we can help them with that. Will await their 

decision.

## 2018-03-24 NOTE — PCM.SN
- Free Text/Narrative


Note: 





Phone call received from nurse regarding current labs including troponin of 

3.36. Patient asymptomatic in regards to chest pain or shortness of breath. BP 

79/43, pulse 76, respirations 24, oxygen saturation of 96% on 4 liters per 

nasal cannula. EKG obtained at that time showing normal sinus rhythm at 79 bpm, 

left axis deviation, inferior infarct age undetermined, no ST segment elevation 

noted. 


Call placed to cardiologist, Dr. Acevedo, at [facility] regarding patient's 

condition. Patient and family do not wish to transfer or have intervention 

performed. However, they would be willing to do IV heparin or IV dopamine until 

the rest of her children arrive. Dr. Acevedo advises against IV heparin since 

her hemoglobin is only 6.6. She stated we could do dopamine or dobutamine drip 

to keep her pressures up. She notes there is nothing further that can be done. 


Discussed with patient and daughter that she is in cardiogenic shock at this 

time. Discussed that we can do a dopamine drip to keep her blood pressures up 

until her family arrives, however it will most likely not change the outcome of 

her current situation. Patient and daughter verbalize understanding and wish to 

proceed with dopamine drip until family arrives. At which time, they will 

consider comfort care status. Discussed with patient that we will continue to 

do our best to keep her comfortable in the interim. Discussed the use of 

morphine for pain or difficulty breathing. Will repeat troponin, hemoglobin, 

and obtain a lactic acid at 2000.

## 2018-03-25 RX ADMIN — OMEPRAZOLE SCH MG: 20 CAPSULE, DELAYED RELEASE ORAL at 07:48

## 2018-03-25 RX ADMIN — CARVEDILOL SCH EACH: 12.5 TABLET, FILM COATED ORAL at 06:26

## 2018-03-25 RX ADMIN — ONDANSETRON PRN MG: 2 INJECTION, SOLUTION INTRAMUSCULAR; INTRAVENOUS at 07:45

## 2018-03-25 RX ADMIN — DOPAMINE HYDROCHLORIDE IN DEXTROSE SCH MLS/HR: 1.6 INJECTION, SOLUTION INTRAVENOUS at 19:06

## 2018-03-25 NOTE — PCM.PN
- General Info


Date of Service: 03/25/18


Subjective Update: 





Patient states she is very tired. Patient's daughter states the patient rested 

well last night. 


Functional Status: Reports: Pain Controlled, Tolerating Diet, Urinating





- Review of Systems


General: Reports: Weakness, Fatigue.  Denies: Fever


Pulmonary: Denies: Shortness of Breath, Cough


Cardiovascular: Denies: Chest Pain, Edema


Gastrointestinal: Denies: Nausea


Genitourinary: Reports: Other (slight discomfort from the catheter)


Neurological: Reports: Weakness.  Denies: Confusion, Trouble Speaking


Psychiatric: Denies: Confusion, Anxiety





- Patient Data


Vitals - Most Recent: 


 Last Vital Signs











Temp  98.8 F   03/25/18 08:15


 


Pulse  77   03/25/18 06:14


 


Resp  20   03/25/18 06:14


 


BP  105/57 L  03/25/18 09:15


 


Pulse Ox  94 L  03/25/18 08:41











Weight - Most Recent: 140 lb


I&O - Last 24 Hours: 


 Intake & Output











 03/24/18 03/25/18 03/25/18





 22:59 06:59 14:59


 


Intake Total 840 0 


 


Output Total 400 700 


 


Balance 440 -700 











Lab Results Last 24 Hours: 


 Laboratory Results - last 24 hr











  03/24/18 03/24/18 03/24/18 Range/Units





  16:15 16:15 21:05 


 


Hgb     (12.0-16.0)  g/dL


 


PT   19.5 H   (8.9-11.4)  SEC


 


INR   2.0 H   (0.9-1.1)  


 


Sodium  142    (136-145)  mmol/L


 


Potassium  3.6    (3.3-5.3)  mmol/L


 


Chloride  105    ()  mmol/L


 


Carbon Dioxide  28.5    (21.0-32.0)  mmol/L


 


BUN  28 H    (6-25)  mg/dL


 


Creatinine  1.34 H    (0.51-1.17)  mg/dL


 


Est Cr Clr Drug Dosing  27.47    mL/min


 


Estimated GFR (MDRD)  38    mL/min


 


Glucose  105    ()  mg/dL


 


Lactic Acid     (0.4-2.0)  mmol/L


 


Calcium  8.5 L    (8.7-10.3)  mg/dL


 


Troponin I  3.36 H*   2.46 H*  (0.00-0.070)  ng/mL














  03/24/18 03/24/18 Range/Units





  21:05 21:05 


 


Hgb  6.1 L*   (12.0-16.0)  g/dL


 


PT    (8.9-11.4)  SEC


 


INR    (0.9-1.1)  


 


Sodium    (136-145)  mmol/L


 


Potassium    (3.3-5.3)  mmol/L


 


Chloride    ()  mmol/L


 


Carbon Dioxide    (21.0-32.0)  mmol/L


 


BUN    (6-25)  mg/dL


 


Creatinine    (0.51-1.17)  mg/dL


 


Est Cr Clr Drug Dosing    mL/min


 


Estimated GFR (MDRD)    mL/min


 


Glucose    ()  mg/dL


 


Lactic Acid   1.0  (0.4-2.0)  mmol/L


 


Calcium    (8.7-10.3)  mg/dL


 


Troponin I    (0.00-0.070)  ng/mL











Med Orders - Current: 


 Current Medications





Acetaminophen (Tylenol)  650 mg PO Q4H PRN


   PRN Reason: Pain


   Last Admin: 03/24/18 09:51 Dose:  650 mg


Allopurinol (Zyloprim)  100 mg PO DAILY [facility]


   Last Admin: 03/25/18 09:45 Dose:  100 mg


Aspirin (Halfprin)  81 mg PO DAILY [facility]


   Last Admin: 03/25/18 09:45 Dose:  81 mg


Calcium Citrate (Calcium Citrate + D)  2 tab PO BID@1200,1800 [facility]


   Last Admin: 03/24/18 19:34 Dose:  Not Given


Cholecalciferol (Vitamin D3)  2,000 units PO DAILY [facility]


   Last Admin: 03/24/18 10:24 Dose:  Not Given


Cyanocobalamin (Vitamin B12)  500 mcg PO MOTUWETHFR@0800 [facility]


   Last Admin: 03/23/18 08:06 Dose:  500 mcg


Dopamine HCl/Dextrose (Dopamine In D5w 400 Mg/250 Ml)  400 mg in 250 mls @ 4.79 

mls/hr IV TITRATE STEFANO; 2 MCG/KG/MIN


   PRN Reason: Protocol


   Last Titration: 03/24/18 22:09 Dose:  4 mcg/kg/min, 9.58 mls/hr


Lactobacillus Acidophilus/Rhamnosus (Multi-Cynthia Plus)  1 cap PO DAILY [facility]


   Last Admin: 03/24/18 10:24 Dose:  Not Given


Levothyroxine Sodium (Synthroid)  125 mcg PO ACBREAKFAST [facility]


   Last Admin: 03/25/18 07:48 Dose:  125 mcg


Melatonin (Melatonin)  3 mg PO BEDTIME PRN


   PRN Reason: Insomnia


   Last Admin: 03/23/18 22:14 Dose:  3 mg


Mirtazapine (Remeron)  15 mg PO BEDTIME [facility]


   Last Admin: 03/24/18 20:28 Dose:  15 mg


Omeprazole (Omeprazole)  20 mg PO ACBREAKFAST [facility]


   Last Admin: 03/25/18 07:48 Dose:  20 mg


Ondansetron HCl (Zofran)  4 mg IVPUSH Q6H PRN


   PRN Reason: Nausea/Vomiting


   Last Admin: 03/25/18 07:45 Dose:  4 mg


Oxycodone HCl (Oxycodone)  5 mg PO BID [facility]


   Last Admin: 03/25/18 09:48 Dose:  5 mg


Deferasirox [Exjade] (500 Mg Tab)  2 each PO DAILY@0600 [facility]


   Last Admin: 03/25/18 06:26 Dose:  2 each


Phytonadione (Vitamin K)  100 mcg PO DAILY [facility]


   Last Admin: 03/25/18 09:45 Dose:  100 mcg


Prednisolone Acetate (Pred Forte 1% Ophth Susp)  0 ml EYEBOTH BID [facility]


   Last Admin: 03/25/18 09:45 Dose:  1 drop


Prednisone (Prednisone)  2 mg PO DAILY@1800 [facility]


   Last Admin: 03/24/18 20:29 Dose:  2 mg


Pyridoxine HCl (Vitamin B6-Pyridoxine)  100 mg PO DAILY [facility]


   Last Admin: 03/24/18 10:24 Dose:  Not Given


Sodium Chloride (Syrex Flush)  5 ml FLUSH Q8HR PRN


   PRN Reason: Keep Vein Open


Urea (Urea 20% Crm)  0 gm TOP DAILY [facility]


   Last Admin: 03/25/18 09:45 Dose:  1 applic


Warfarin Sodium (Coumadin)  2.5 mg PO FR@1800 [facility]


   Last Admin: 03/23/18 18:04 Dose:  2.5 mg


Warfarin Sodium (Coumadin)  5 mg PO SUMOTUWETHSA@1800 [facility]


   Last Admin: 03/24/18 20:29 Dose:  5 mg





Discontinued Medications





Acetaminophen (Tylenol)  650 mg PO Q4H PRN


   PRN Reason: Pain (Mild 1-3)/fever


Furosemide (Lasix)  40 mg IVPUSH NOW ONE


   Stop: 03/23/18 23:11


   Last Admin: 03/23/18 23:14 Dose:  40 mg


Furosemide (Lasix)  20 mg IVPUSH NOW ONE


   Stop: 03/24/18 09:33


   Last Admin: 03/24/18 10:11 Dose:  20 mg


Sodium Chloride (Normal Saline)  1,000 mls @ 150 mls/hr IV ASDIRECTED [facility]


   Last Infusion: 03/21/18 23:40 Dose:  100 mls/hr


Levofloxacin/Dextrose 500 mg/ (Premix)  100 mls @ 100 mls/hr IV ONETIME ONE


   Stop: 03/22/18 00:01


   Last Admin: 03/21/18 23:43 Dose:  100 mls/hr


Sodium Chloride (Normal Saline)  1,000 mls @ 100 mls/hr IV ASDIRECTED [facility]


   Last Admin: 03/22/18 15:32 Dose:  100 mls/hr


Sodium Chloride (Normal Saline)  1,000 mls @ 40 mls/hr IV ASDIRECTED [facility]


   Last Admin: 03/22/18 20:04 Dose:  40 mls/hr


Sodium Chloride (Normal Saline)  250 mls @ 999 mls/hr IV ONETIME ONE


   Stop: 03/22/18 11:45


   Last Admin: 03/22/18 11:30 Dose:  999 mls/hr


Levofloxacin/Dextrose (Levaquin In D5w 250 Mg/50 Ml)  50 mls @ 50 mls/hr IV 

Q24H [facility]


   Last Admin: 03/23/18 15:52 Dose:  50 mls/hr


Heparin Sodium/Dextrose ()  25,000 units in 250 mls @ 8 mls/hr IV TITRATE [facility]


   PRN Reason: Protocol


Lorazepam (Ativan)  0.25 mg IVPUSH ONETIME ONE


   Stop: 03/23/18 22:46


   Last Admin: 03/23/18 22:59 Dose:  0.25 mg


Lorazepam (Ativan) Confirm Administered Dose 2 mg .ROUTE .STK-MED ONE


   Stop: 03/23/18 22:49


   Last Admin: 03/23/18 23:00 Dose:  Not Given


Ondansetron HCl (Zofran)  4 mg IV Q6H PRN


   PRN Reason: Nausea/Vomiting


   Last Admin: 03/22/18 08:52 Dose:  4 mg


Sodium Chloride (Syrex Flush)  5 ml FLUSH Q8HR PRN


   PRN Reason: Keep Vein Open











- Exam


Quality Assessment: Supplemental Oxygen (94% on 5 liters per nasal cannula), 

Urine Catheter (clear yellow returns), DVT Prophylaxis (On warfarin)


General: Alert, Oriented, Cooperative, No Acute Distress


Lungs: Clear to Auscultation (Right lung fields), Normal Respiratory Effort, 

Crackles (coarse crackles to left lung fields).  No: Stridor, Wheezing


Cardiovascular: Regular Rate, Regular Rhythm, Murmurs (aortic murmur noted)


Extremities: No Pedal Edema.  No: Mottled


Skin: Warm, Dry


Neurological: Normal Speech


Psy/Mental Status: Alert, Normal Affect, Normal Mood.  No: Anxious





- Problem List Review


Problem List Initiated/Reviewed/Updated: Yes





- My Orders


Last 24 Hours: 


My Active Orders





03/24/18 17:30


EKG 12 Lead [EK] Routine 





03/24/18 18:30


DOPamine/Dextrose 5%-Water [DOPamine in D5W 400 MG/250 ML] 400 mg in 250 ml IV 

TITRATE 














- Plan


Plan:: 





HPI: This is an 83 year old female who is a resident of a long-term care 

facility that was admitted through the ED for complaints of generalized 

weakness with nausea and vomiting that began earlier that evening. She was 

diagnosed the day prior with a UTI and placed on antibiotics. She reported that 

she felt worse after starting the antibiotics and was found to have a 

temperature of 102.0 F. Patient was found to have a slightly elevated troponin 

in the ED, but denied chest pain. The patient has a history of chronic anemia 

due to myelodysplastic syndrome and is followed closely by hematology/oncology. 

She has been requiring frequent weekly blood transfusions and has developed 

subsequent antibodies. She last received 1 unit of PRBCs on 3/17/18. Her 

hemoglobin in the ER was 6.3.





________________________________________________________________________________

____________________________________________________





Update: Patient rested well last night. She is on 5 liters per nasal cannula, 

which is increased from yesterday. She is on the dopamaine drip at 4 mcg/kg/min 

with a BP of 98/44. Family at bedside and all 4 children present for rounds 

this morning. 





PRIMARY ASSESSMENT/PLAN: 





Cardiogenic shock secondary to cardiogenic pulmonary edema related to NSTEMI 

from ischemic demand. Last evening troponin down to 2.46. No further episodes 

of chest pain. She continues on telemetry and a dopamine drip at this time. 

Extensive discussion held with patient and family this morning in regards to DNR

/DNI versus comfort measures and what each entails. At this time, patient alert 

and oriented x 3 and able to make her wishes known. She would like to continue 

with the dopamine drip for now. She does not wish to go back to the nursing 

home and/or have Hospice. She does not wish to have any further lab draws. 

Discussed the navarrete catheter and at this time will leave in place as she is 

quite weak. 24 hour output of 1700 mL. 





Acute on chronic combined congestive heart failure. Unable to treat with lasix 

at this time given current hypotensive state and patient wavering on DNR versus 

comfort care status. 





Hypotension. Continue with dopamine drip until patient and family decides this 

is no longer wanted. 





CKD stage 3. Yesterday afternoon BUN 28, creatinine 1.34, GFR 38. 





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 Hgb last evening 6.1. No 

further blood transfusions given patient's fragile fluid overload state. Family 

aware and agrees. 





UTI, resolved. Lactic acid 1.0. 





Generalized weakness. 





Questionable delayed immune-mediated transfusion reaction. 





SECONDARY ASSESSMENT/PLAN: 





History of DVTs and PE. INR 2.0. Continuing coumadin at this time. 





History of hypertension. 





History of MI.





CAD.





History of hyperlipidemia. 





Insomnia. Continue melatonin and mirtazipine. 





GERD. Continue omeprazole. 





Depression. 





Giant cell arteritis. 





Lumbago with compression fractures. 





History of gout. 





History of iron overload. 





Polymyalgia rheumatica. 





DVT prophylaxis. On warfarin. 





Overall treatment plan: Extensive discussion held with patient and family 

during rounds (approximately 25 minutes) about the current state of the patient 

and expected course. At this time, patient wishes to continue with dopamine 

drip until it is no longer effective. She wishes to stay here and be kept 

comfortable. Will discontinue all non-essential medications at this time.

## 2018-03-26 RX ADMIN — OMEPRAZOLE SCH MG: 20 CAPSULE, DELAYED RELEASE ORAL at 07:11

## 2018-03-26 NOTE — PCM.PN
- General Info


Date of Service: 03/26/18


Subjective Update: 





Patient stating she would like the dopamine turned off this evening. She states 

she is hoping to just "fall asleep and go". 


Functional Status: Reports: Pain Controlled, Tolerating Diet, Urinating.  Denies

: New Symptoms


Pain Score: 0





- Review of Systems


General: Reports: Weakness, Fatigue, Malaise.  Denies: Fever


Pulmonary: Denies: Shortness of Breath


Cardiovascular: Denies: Chest Pain, Edema


Gastrointestinal: Denies: Abdominal Pain, Nausea


Genitourinary: Reports: No Symptoms


Neurological: Denies: Confusion


Psychiatric: Denies: Confusion, Anxiety





- Patient Data


Vitals - Most Recent: 


 Last Vital Signs











Temp  98.1 F   03/26/18 09:17


 


Pulse  83   03/26/18 09:17


 


Resp  16   03/26/18 09:17


 


BP  104/57 L  03/26/18 09:17


 


Pulse Ox  100   03/26/18 09:17











Weight - Most Recent: 140 lb


I&O - Last 24 Hours: 


 Intake & Output











 03/25/18 03/26/18 03/26/18





 22:59 06:59 14:59


 


Intake Total 950 66 


 


Output Total 1300 875 


 


Balance -350 -809 











Med Orders - Current: 


 Current Medications





Acetaminophen (Tylenol)  650 mg PO Q4H PRN


   PRN Reason: Pain


   Last Admin: 03/24/18 09:51 Dose:  650 mg


Atropine Sulfate (Atropine 0.1 Mg/Ml)  0 mg IVPUSH ASDIRECTED PRN


   PRN Reason: Heart


Epinephrine HCl (Epinephrine 1:10,000)  1 mg IVPUSH ASDIRECTED PRN


   PRN Reason: Heart


Glycopyrrolate (Robinul)  1 mg PO TID PRN


   PRN Reason: Other


Dopamine HCl/Dextrose (Dopamine In D5w 400 Mg/250 Ml)  400 mg in 250 mls @ 4.79 

mls/hr IV TITRATE STEFANO; 2 MCG/KG/MIN


   PRN Reason: Protocol


   Last Admin: 03/25/18 19:06 Dose:  4 mcg/kg/min, 9.58 mls/hr


Lidocaine HCl (Xylocaine 2%)  0 mg IVPUSH ASDIRECTED PRN


   PRN Reason: Heart


Melatonin (Melatonin)  3 mg PO BEDTIME PRN


   PRN Reason: Insomnia


   Last Admin: 03/25/18 21:06 Dose:  3 mg


Mirtazapine (Remeron)  15 mg PO BEDTIME [facility]


   Last Admin: 03/25/18 21:06 Dose:  15 mg


Nitroglycerin (Nitrostat)  0.4 mg SL ASDIRECTED PRN


   PRN Reason: Heart


Ondansetron HCl (Zofran)  4 mg IVPUSH Q6H PRN


   PRN Reason: Nausea/Vomiting


   Last Admin: 03/25/18 07:45 Dose:  4 mg


Oxycodone HCl (Oxycodone)  5 mg PO BID [facility]


   Last Admin: 03/26/18 08:28 Dose:  5 mg


Prednisolone Acetate (Pred Forte 1% Ophth Susp)  0 ml EYEBOTH BID [facility]


   Last Admin: 03/26/18 08:29 Dose:  1 drop


Sodium Chloride (Syrex Flush)  5 ml FLUSH Q8HR PRN


   PRN Reason: Keep Vein Open


Urea (Urea 20% Crm)  0 gm TOP DAILY [facility]


   Last Admin: 03/26/18 08:29 Dose:  1 applic





Discontinued Medications





Acetaminophen (Tylenol)  650 mg PO Q4H PRN


   PRN Reason: Pain (Mild 1-3)/fever


Allopurinol (Zyloprim)  100 mg PO DAILY [facility]


   Last Admin: 03/25/18 09:45 Dose:  100 mg


Aspirin (Halfprin)  81 mg PO DAILY [facility]


   Last Admin: 03/25/18 09:45 Dose:  81 mg


Calcium Citrate (Calcium Citrate + D)  2 tab PO BID@1200,1800 [facility]


   Last Admin: 03/24/18 19:34 Dose:  Not Given


Cholecalciferol (Vitamin D3)  2,000 units PO DAILY [facility]


   Last Admin: 03/24/18 10:24 Dose:  Not Given


Cyanocobalamin (Vitamin B12)  500 mcg PO MOTUWETHFR@0800 [facility]


   Last Admin: 03/23/18 08:06 Dose:  500 mcg


Furosemide (Lasix)  40 mg IVPUSH NOW ONE


   Stop: 03/23/18 23:11


   Last Admin: 03/23/18 23:14 Dose:  40 mg


Furosemide (Lasix)  20 mg IVPUSH NOW ONE


   Stop: 03/24/18 09:33


   Last Admin: 03/24/18 10:11 Dose:  20 mg


Sodium Chloride (Normal Saline)  1,000 mls @ 150 mls/hr IV ASDIRECTED [facility]


   Last Infusion: 03/21/18 23:40 Dose:  100 mls/hr


Levofloxacin/Dextrose 500 mg/ (Premix)  100 mls @ 100 mls/hr IV ONETIME ONE


   Stop: 03/22/18 00:01


   Last Admin: 03/21/18 23:43 Dose:  100 mls/hr


Sodium Chloride (Normal Saline)  1,000 mls @ 100 mls/hr IV ASDIRECTED [facility]


   Last Admin: 03/22/18 15:32 Dose:  100 mls/hr


Sodium Chloride (Normal Saline)  1,000 mls @ 40 mls/hr IV ASDIRECTED [facility]


   Last Admin: 03/22/18 20:04 Dose:  40 mls/hr


Sodium Chloride (Normal Saline)  250 mls @ 999 mls/hr IV ONETIME ONE


   Stop: 03/22/18 11:45


   Last Admin: 03/22/18 11:30 Dose:  999 mls/hr


Levofloxacin/Dextrose (Levaquin In D5w 250 Mg/50 Ml)  50 mls @ 50 mls/hr IV 

Q24H [facility]


   Last Admin: 03/23/18 15:52 Dose:  50 mls/hr


Heparin Sodium/Dextrose ()  25,000 units in 250 mls @ 8 mls/hr IV TITRATE ST[facility]ANO


   PRN Reason: Protocol


Lactobacillus Acidophilus/Rhamnosus (Multi-Cynthia Plus)  1 cap PO DAILY [facility]


   Last Admin: 03/24/18 10:24 Dose:  Not Given


Levothyroxine Sodium (Synthroid)  125 mcg PO ACBREAKFAST [facility]


   Last Admin: 03/26/18 07:11 Dose:  125 mcg


Lorazepam (Ativan)  0.25 mg IVPUSH ONETIME ONE


   Stop: 03/23/18 22:46


   Last Admin: 03/23/18 22:59 Dose:  0.25 mg


Lorazepam (Ativan) Confirm Administered Dose 2 mg .ROUTE .STK-MED ONE


   Stop: 03/23/18 22:49


   Last Admin: 03/23/18 23:00 Dose:  Not Given


Omeprazole (Omeprazole)  20 mg PO ACBREAKFAST [facility]


   Last Admin: 03/26/18 07:11 Dose:  20 mg


Ondansetron HCl (Zofran)  4 mg IV Q6H PRN


   PRN Reason: Nausea/Vomiting


   Last Admin: 03/22/18 08:52 Dose:  4 mg


Deferasirox [Exjade] (500 Mg Tab)  2 each PO DAILY@0600 [facility]


   Last Admin: 03/25/18 06:26 Dose:  2 each


Phytonadione (Vitamin K)  100 mcg PO DAILY [facility]


   Last Admin: 03/26/18 08:28 Dose:  100 mcg


Prednisone (Prednisone)  2 mg PO DAILY@1800 [facility]


   Last Admin: 03/25/18 17:26 Dose:  2 mg


Pyridoxine HCl (Vitamin B6-Pyridoxine)  100 mg PO DAILY [facility]


   Last Admin: 03/24/18 10:24 Dose:  Not Given


Sodium Chloride (Syrex Flush)  5 ml FLUSH Q8HR PRN


   PRN Reason: Keep Vein Open


Warfarin Sodium (Coumadin)  2.5 mg PO FR@1800 [facility]


   Last Admin: 03/23/18 18:04 Dose:  2.5 mg


Warfarin Sodium (Coumadin)  5 mg PO SUMOTUWETHSA@1800 [facility]


   Last Admin: 03/25/18 17:26 Dose:  5 mg











- Exam


Quality Assessment: Supplemental Oxygen (5 liters per nasal cannula), Urine 

Catheter (clear yellow returns).  No: DVT Prophylaxis


General: Alert, Oriented, Cooperative, No Acute Distress


Lungs: Normal Respiratory Effort, Decreased Breath Sounds (throughout), 

Crackles (left lung fields)


Cardiovascular: Regular Rate, Regular Rhythm, Murmurs (aortic murmur)


Extremities: No Pedal Edema.  No: Mottled


Skin: Warm, Dry, Other (ashen/jaundiced appearance)


Neurological: Normal Speech


Psy/Mental Status: Alert, Normal Affect, Normal Mood.  No: Anxious, Agitated





- Problem List Review


Problem List Initiated/Reviewed/Updated: Yes





- My Orders


Last 24 Hours: 


My Active Orders





03/25/18 15:13


Communication Order [RC] [id number] 





03/26/18 09:00


Atropine [Atropine 0.1 MG/ML]   0 mg IVPUSH ASDIRECTED PRN 


EPINEPHrine [EPINEPHrine 1:10,000]   1 mg IVPUSH ASDIRECTED PRN 


Lidocaine 2% [Xylocaine 2%]   0 mg IVPUSH ASDIRECTED PRN 


Nitroglycerin [Nitrostat]   0.4 mg SL ASDIRECTED PRN 





03/26/18 09:15


Comfort Measures [OM.PC] Routine 


Code Status [Resuscitation Status] Routine 





03/26/18 09:16


Glycopyrrolate [Robinul]   1 mg PO TID PRN 





03/26/18 09:17


Vital Signs [RC] Q4H 





03/26/18 Lunch


Regular Diet [DIET] 














- Plan


Plan:: 





HPI: This is an 83 year old female who is a resident of a long-term care 

facility that was admitted through the ED for complaints of generalized 

weakness with nausea and vomiting that began earlier that evening. She was 

diagnosed the day prior with a UTI and placed on antibiotics. She reported that 

she felt worse after starting the antibiotics and was found to have a 

temperature of 102.0 F. Patient was found to have a slightly elevated troponin 

in the ED, but denied chest pain. The patient has a history of chronic anemia 

due to myelodysplastic syndrome and is followed closely by hematology/oncology. 

She has been requiring frequent weekly blood transfusions and has developed 

subsequent antibodies. She last received 1 unit of PRBCs on 3/17/18. Her 

hemoglobin in the ER was 6.3.





________________________________________________________________________________

____________________________________________________





Update: Patient wishing to turn off the dopamine drip at 1700 and be kept 

comfortable. Family at bedside and aware of patient's wishes. 





PRIMARY ASSESSMENT/PLAN: 





Cardiogenic shock secondary to cardiogenic pulmonary edema related to NSTEMI 

from ischemic demand. Dopamine drip will be continued until 1700 per patient 

wishes. This along with telemetry can be discontinued at that time. Comfort 

measures will be put in place, code status changed to reflect this. Robinul as 

needed for secretions. Morphine IV as needed for pain and shortness of breath. 

Ativan IV as needed for anxiety. Oxygen as needed for comfort. All non-

essential medications were discontinued. Kept in place melatonin and 

mirtazipine at bedtime along with oxycodone BID for pain. Will continue with 

the navarrete catheter. 





Acute on chronic combined congestive heart failure. 





Hypotension. Plan as above. 





CKD stage 3. Most recent BUN 28, creatinine 1.34, GFR 38. 





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 Last hgb of 6.1. 





UTI, resolved. Lactic acid 1.0. 





SECONDARY ASSESSMENT/PLAN: 





History of DVTs and PE. Coumadin discontinued. 





History of hypertension. 





History of MI.





CAD.





History of hyperlipidemia. 





Insomnia. Continue melatonin and mirtazipine. 





GERD. Discontinued omeprazole. Zofran IV as needed for nausea.  





Depression. 





Giant cell arteritis. 





Lumbago with compression fractures. 





History of gout. 





History of iron overload. 





Polymyalgia rheumatica. 





DVT prophylaxis. Comfort measures only. 





Overall treatment plan: Patient wishes to have the dopamine turned off at 1700 

and wants to be kept comfortable at that time.

## 2018-03-27 ENCOUNTER — HOSPITAL ENCOUNTER (INPATIENT)
Dept: HOSPITAL 77 - KA.MS | Age: 83
LOS: 1 days | DRG: 951 | End: 2018-03-28
Attending: FAMILY MEDICINE | Admitting: NURSE PRACTITIONER
Payer: MEDICARE

## 2018-03-27 VITALS — DIASTOLIC BLOOD PRESSURE: 45 MMHG | SYSTOLIC BLOOD PRESSURE: 85 MMHG

## 2018-03-27 VITALS — SYSTOLIC BLOOD PRESSURE: 95 MMHG | DIASTOLIC BLOOD PRESSURE: 42 MMHG

## 2018-03-27 DIAGNOSIS — D63.8: ICD-10-CM

## 2018-03-27 DIAGNOSIS — N18.3: ICD-10-CM

## 2018-03-27 DIAGNOSIS — F32.9: ICD-10-CM

## 2018-03-27 DIAGNOSIS — Z99.81: ICD-10-CM

## 2018-03-27 DIAGNOSIS — Z79.01: ICD-10-CM

## 2018-03-27 DIAGNOSIS — R57.0: ICD-10-CM

## 2018-03-27 DIAGNOSIS — Z79.899: ICD-10-CM

## 2018-03-27 DIAGNOSIS — Z51.5: Primary | ICD-10-CM

## 2018-03-27 DIAGNOSIS — D46.9: ICD-10-CM

## 2018-03-27 DIAGNOSIS — E78.5: ICD-10-CM

## 2018-03-27 DIAGNOSIS — Z88.8: ICD-10-CM

## 2018-03-27 DIAGNOSIS — Z86.718: ICD-10-CM

## 2018-03-27 DIAGNOSIS — I25.2: ICD-10-CM

## 2018-03-27 DIAGNOSIS — I50.43: ICD-10-CM

## 2018-03-27 DIAGNOSIS — I13.0: ICD-10-CM

## 2018-03-27 DIAGNOSIS — G47.00: ICD-10-CM

## 2018-03-27 DIAGNOSIS — R01.1: ICD-10-CM

## 2018-03-27 DIAGNOSIS — I25.10: ICD-10-CM

## 2018-03-27 DIAGNOSIS — Z93.6: ICD-10-CM

## 2018-03-27 DIAGNOSIS — M48.56XA: ICD-10-CM

## 2018-03-27 DIAGNOSIS — M31.5: ICD-10-CM

## 2018-03-27 DIAGNOSIS — E03.9: ICD-10-CM

## 2018-03-27 DIAGNOSIS — K21.9: ICD-10-CM

## 2018-03-27 RX ADMIN — CARVEDILOL SCH EACH: 12.5 TABLET, FILM COATED ORAL at 21:52

## 2018-03-27 NOTE — PCM.PN
- General Info


Date of Service: 03/27/18


Functional Status: Reports: Pain Controlled (Pain controlled via subcutaneous 

morphine), Tolerating Diet (Desired and ate a muffin this morning)





- Review of Systems


General: Reports: Weakness, Fatigue, Malaise


HEENT: Reports: No Symptoms, Headaches


Pulmonary: Reports: No Symptoms


Gastrointestinal: Reports: No Symptoms


Genitourinary: Reports: Other (Heard catheter--dignity)


Skin: Reports: Pallor





- Patient Data


Vitals - Most Recent: 


 Last Vital Signs











Temp  99.1 F   03/26/18 14:40


 


Pulse  74   03/26/18 14:40


 


Resp  16   03/26/18 14:40


 


BP  114/58 L  03/26/18 14:40


 


Pulse Ox  97   03/26/18 17:00











Weight - Most Recent: 140 lb


I&O - Last 24 Hours: 


 Intake & Output











 03/26/18 03/27/18 03/27/18





 22:59 06:59 14:59


 


Intake Total 428  


 


Output Total 400  


 


Balance 28  











Yogesh Results Last 24 Hours: 


 Microbiology











 03/21/18 22:55 Aerobic Blood Culture - Final





 Blood - Venous - Lab Draw    NO GROWTH AFTER 5 DAYS





 Anaerobic Blood Culture - Final





    NO GROWTH AFTER 5 DAYS


 


 03/21/18 21:50 Aerobic Blood Culture - Final





 Blood - Venous    NO GROWTH AFTER 5 DAYS





 Anaerobic Blood Culture - Final





    NO GROWTH AFTER 5 DAYS











Med Orders - Current: 


 Current Medications





Acetaminophen (Tylenol)  650 mg PO Q4H PRN


   PRN Reason: Pain


   Last Admin: 03/24/18 09:51 Dose:  650 mg


Glycopyrrolate (Robinul)  1 mg PO TID PRN


   PRN Reason: secretions


Dopamine HCl/Dextrose (Dopamine In D5w 400 Mg/250 Ml)  400 mg in 250 mls @ 4.79 

mls/hr IV TITRATE [facility]; Protocol


   Last Admin: 03/25/18 19:06 Dose:  4 mcg/kg/min, 9.58 mls/hr


Lorazepam (Ativan)  1 mg IVPUSH Q4H PRN


   PRN Reason: Anxiety


Melatonin (Melatonin)  3 mg PO BEDTIME PRN


   PRN Reason: Insomnia


   Last Admin: 03/25/18 21:06 Dose:  3 mg


Mirtazapine (Remeron)  15 mg PO BEDTIME [facility]


   Last Admin: 03/26/18 21:42 Dose:  15 mg


Morphine Sulfate (Morphine)  2 mg SUBCUT Q1H PRN


   PRN Reason: pain/sob


   Last Admin: 03/26/18 23:34 Dose:  2 mg


Ondansetron HCl (Zofran)  4 mg IVPUSH Q6H PRN


   PRN Reason: Nausea/Vomiting


   Last Admin: 03/25/18 07:45 Dose:  4 mg


Oxycodone HCl (Oxycodone)  5 mg PO BID [facility]


   Last Admin: 03/26/18 21:45 Dose:  Not Given


Prednisolone Acetate (Pred Forte 1% Ophth Susp)  0 ml EYEBOTH BID [facility]


   Last Admin: 03/26/18 21:42 Dose:  1 drop


Sodium Chloride (Syrex Flush)  5 ml FLUSH Q8HR PRN


   PRN Reason: Keep Vein Open


Urea (Urea 20% Crm)  0 gm TOP DAILY [facility]


   Last Admin: 03/26/18 08:29 Dose:  1 applic





Discontinued Medications





Acetaminophen (Tylenol)  650 mg PO Q4H PRN


   PRN Reason: Pain (Mild 1-3)/fever


Allopurinol (Zyloprim)  100 mg PO DAILY [facility]


   Last Admin: 03/25/18 09:45 Dose:  100 mg


Aspirin (Halfprin)  81 mg PO DAILY [facility]


   Last Admin: 03/25/18 09:45 Dose:  81 mg


Atropine Sulfate (Atropine 0.1 Mg/Ml)  0 mg IVPUSH ASDIRECTED PRN


   PRN Reason: Heart


Calcium Citrate (Calcium Citrate + D)  2 tab PO BID@1200,1800 [facility]


   Last Admin: 03/24/18 19:34 Dose:  Not Given


Cholecalciferol (Vitamin D3)  2,000 units PO DAILY [facility]


   Last Admin: 03/24/18 10:24 Dose:  Not Given


Cyanocobalamin (Vitamin B12)  500 mcg PO MOTUWETHFR@0800 [facility]


   Last Admin: 03/23/18 08:06 Dose:  500 mcg


Epinephrine HCl (Epinephrine 1:10,000)  1 mg IVPUSH ASDIRECTED PRN


   PRN Reason: Heart


Furosemide (Lasix)  40 mg IVPUSH NOW ONE


   Stop: 03/23/18 23:11


   Last Admin: 03/23/18 23:14 Dose:  40 mg


Furosemide (Lasix)  20 mg IVPUSH NOW ONE


   Stop: 03/24/18 09:33


   Last Admin: 03/24/18 10:11 Dose:  20 mg


Sodium Chloride (Normal Saline)  1,000 mls @ 150 mls/hr IV ASDIRECTED [facility]


   Last Infusion: 03/21/18 23:40 Dose:  100 mls/hr


Levofloxacin/Dextrose 500 mg/ (Premix)  100 mls @ 100 mls/hr IV ONETIME ONE


   Stop: 03/22/18 00:01


   Last Admin: 03/21/18 23:43 Dose:  100 mls/hr


Sodium Chloride (Normal Saline)  1,000 mls @ 100 mls/hr IV ASDIRECTED [facility]


   Last Admin: 03/22/18 15:32 Dose:  100 mls/hr


Sodium Chloride (Normal Saline)  1,000 mls @ 40 mls/hr IV ASDIRECTED [facility]


   Last Admin: 03/22/18 20:04 Dose:  40 mls/hr


Sodium Chloride (Normal Saline)  250 mls @ 999 mls/hr IV ONETIME ONE


   Stop: 03/22/18 11:45


   Last Admin: 03/22/18 11:30 Dose:  999 mls/hr


Levofloxacin/Dextrose (Levaquin In D5w 250 Mg/50 Ml)  50 mls @ 50 mls/hr IV 

Q24H [facility]


   Last Admin: 03/23/18 15:52 Dose:  50 mls/hr


Heparin Sodium/Dextrose ()  25,000 units in 250 mls @ 8 mls/hr IV TITRATE [facility]; 

Protocol


Lactobacillus Acidophilus/Rhamnosus (Multi-Cynthia Plus)  1 cap PO DAILY [facility]


   Last Admin: 03/24/18 10:24 Dose:  Not Given


Levothyroxine Sodium (Synthroid)  125 mcg PO ACBREAKFAST [facility]


   Last Admin: 03/26/18 07:11 Dose:  125 mcg


Lidocaine HCl (Xylocaine 2%)  0 mg IVPUSH ASDIRECTED PRN


   PRN Reason: Heart


Lorazepam (Ativan)  0.25 mg IVPUSH ONETIME ONE


   Stop: 03/23/18 22:46


   Last Admin: 03/23/18 22:59 Dose:  0.25 mg


Lorazepam (Ativan) Confirm Administered Dose 2 mg .ROUTE .STK-MED ONE


   Stop: 03/23/18 22:49


   Last Admin: 03/23/18 23:00 Dose:  Not Given


Morphine Sulfate (Morphine)  2 mg IVPUSH Q1H PRN


   PRN Reason: pain/sob


Nitroglycerin (Nitrostat)  0.4 mg SL ASDIRECTED PRN


   PRN Reason: Heart


Omeprazole (Omeprazole)  20 mg PO ACBREAKFAST [facility]


   Last Admin: 03/26/18 07:11 Dose:  20 mg


Ondansetron HCl (Zofran)  4 mg IV Q6H PRN


   PRN Reason: Nausea/Vomiting


   Last Admin: 03/22/18 08:52 Dose:  4 mg


Deferasirox [Exjade] (500 Mg Tab)  2 each PO DAILY@0600 [facility]


   Last Admin: 03/25/18 06:26 Dose:  2 each


Phytonadione (Vitamin K)  100 mcg PO DAILY [facility]


   Last Admin: 03/26/18 08:28 Dose:  100 mcg


Prednisone (Prednisone)  2 mg PO DAILY@1800 [facility]


   Last Admin: 03/25/18 17:26 Dose:  2 mg


Pyridoxine HCl (Vitamin B6-Pyridoxine)  100 mg PO DAILY [facility]


   Last Admin: 03/24/18 10:24 Dose:  Not Given


Sodium Chloride (Syrex Flush)  5 ml FLUSH Q8HR PRN


   PRN Reason: Keep Vein Open


Warfarin Sodium (Coumadin)  2.5 mg PO FR@1800 [facility]


   Last Admin: 03/23/18 18:04 Dose:  2.5 mg


Warfarin Sodium (Coumadin)  5 mg PO SUMOTUWETHSA@1800 [facility]


   Last Admin: 03/25/18 17:26 Dose:  5 mg











- Exam


Quality Assessment: Supplemental Oxygen


General: Alert, Oriented, Cooperative, No Acute Distress, Sedated


HEENT: No: Pupils Equal


Neck: Supple


Lungs: Crackles


Cardiovascular: Regular Rate


GI/Abdominal Exam: Soft


Psy/Mental Status: Alert, Labile Mood





- Problem List Review


Problem List Initiated/Reviewed/Updated: Yes





- Plan


Plan:: 





HPI: This is an 83 year old female who is a resident of a long-term care 

facility that was admitted through the ED for complaints of generalized 

weakness with nausea and vomiting that began earlier that evening. She was 

diagnosed the day prior with a UTI and placed on antibiotics. She reported that 

she felt worse after starting the antibiotics and was found to have a 

temperature of 102.0 F. Patient was found to have a slightly elevated troponin 

in the ED, but denied chest pain. The patient has a history of chronic anemia 

due to myelodysplastic syndrome and is followed closely by hematology/oncology. 

She has been requiring frequent weekly blood transfusions and has developed 

subsequent antibodies. She last received 1 unit of PRBCs on 3/17/18. Her 

hemoglobin in the ER was 6.3.





________________________________________________________________________________

____________________________________________________





Update: IV was lost during night, was able to eat more often this morning. 

Dopamine/pressor was discontinued. Patient DO NOT RESUSCITATE/Comfort Care








PRIMARY ASSESSMENT/PLAN: 





Cardiogenic shock resolved, MAP adequate without pressors. Comfort measures  

All non-essential medications were discontinued.  





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





CKD stage 3. Most recent BUN 28, creatinine 1.34, GFR 38. Dignity indwelling 

catheter





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 Last hgb of 6.1. No longer 

lab draws





UTI, resolved. 








SECONDARY ASSESSMENT/PLAN: 


History of DVTs and PE. Coumadin discontinued. 


History of hypertension. 


History of MI.


CAD.


History of hyperlipidemia. 


Insomnia. Continue melatonin and mirtazipine.


GERD. Discontinued omeprazole. Zofran IV as needed for nausea. 


Depression. 


Giant cell arteritis. 


Lumbago with compression fractures. 


History of gout. 


History of iron overload. 


Polymyalgia rheumatica. 


DVT prophylaxis. Comfort measures only. 





Overall treatment plan: Discussion with family regarding treatment plan, 

morphine radhal, discussed hospice here at [facility], discussed long-term 

care bed situation along with payment source.  Comfort Care, family is in 

agreement with plan.

## 2018-03-27 NOTE — PCM.HP
H&P History of Present Illness





- General


Date of Service: 18


Admit Problem/Dx: 


 Admission Diagnosis/Problem





Admission Diagnosis/Problem      Anemia








Source of Information: Family, Old Records, RN


History Limitations: Reports: No Limitations





- History of Present Illness


Initial Comments - Free Text/Narative: 





83 year old female who is a resident of a long-term care facility that was 

initially admitted through the ED in acute care status for complaints of 

generalized weakness with nausea and vomiting. Due to MDS patient has had 

multiple blood transfusions however she was requiring a more frequently--thus 

has developed subsequent antibodies making any future transfusions more 

difficult for compatibility especially the possibility of a delayed immune 

blood transfusion reaction.  She last received 1 unit of PRBCs on 3/17/18. Her 

hemoglobin in the ER was 6.3. She was diagnosed the day prior with a UTI and 

placed on antibiotics. She reported that she felt worse after starting the 

antibiotics and was found to have a temperature of 102.0  Patient was found to 

have a slightly elevated troponin in the ED, but denied chest pain. During her 

acute care stay she did have ongoing heart failure with subsequent non-STEMI 

with cardiogenic shock requiring pressors did improve her situation clinically 

however however the family eventually decided to discontinue pressures in place 

her comfort care. Family opted  private pay swing bed here at [facility]





- Related Data


Allergies/Adverse Reactions: 


 Allergies











Allergy/AdvReac Type Severity Reaction Status Date / Time


 


erythromycin base Allergy Severe Vomiting Verified 18 18:28





[Erythromycin Base]     


 


Penicillins Allergy Unknown Hives Verified 18 18:28


 


Sulfa (Sulfonamide Allergy  Other Verified 18 18:28





Antibiotics)     


 


sulfamethoxazole Allergy  Other Verified 18 18:28





[From Bactrim]     


 


trimethoprim [From Bactrim] Allergy  Other Verified 18 18:28


 


lenalidomide [From Revlimid] AdvReac Mild Rash Verified 18 18:28











Home Medications: 


 Home Meds





Cholecalciferol (Vitamin D3) [Vitamin D3] 2,000 unit PO DAILY 13 [History]


Cyanocobalamin (Vitamin B-12) [B-12 Dots] 500 mcg PO MOTUWETHFR@0800 13 [

History]


Calcium Carbonate/Vitamin D3 [Calcium 600-Vit D3 400 Tablet] 1 tab PO BID@1200,

1800 12/01/15 [History]


Mirtazapine [Remeron] 15 mg PO BEDTIME 16 [History]


Phytonadione [Vitamin K] 100 mcg PO DAILY 16 [History]


Pyridoxine HCl [Vitamin B-6] 100 mg PO DAILY 16 [History]


Aspirin [Halfprin] 81 mg PO DAILY #90 tab.ec 17 [Rx]


Lactobacillus Acidophilus [Acidophilus Lactobacillus] 1 cap PO DAILY 17 [

History]


Levothyroxine Sodium [Synthroid] 125 mcg PO ACBREAKFAST 17 [History]


Melatonin/Pyridoxine HCl (B6) [Melatonin 3 mg Tablet] 3 mg PO BEDTIME PRN  [History]


Pantoprazole Sodium [Protonix] 20 mg PO ACBREAKFAST 17 [History]


Warfarin [Coumadin] 5 mg PO SUMOTUWETHSA@1800 17 [History]


predniSONE [Prednisone] 2 mg PO DAILY@1800 17 [History]


prednisoLONE Acetate [Pred Forte 1% Ophth Susp] 1 drop EYEBOTH BID 17 [

History]


Allopurinol [Zyloprim] 100 mg PO DAILY 17 [History]


Denosumab [Prolia] 60 mg SUBCUT ASDIRECTED 17 [History]


oxyCODONE 5 mg PO BID 17 [History]


Warfarin [Coumadin] 2.5 mg PO FR@1800 18 [History]


Acetaminophen 650 mg PO Q4H PRN 18 [History]


Cephalexin 500 mg PO BID 18 [History]


Deferasirox [Exjade] 1,000 mg PO DAILY@0600 18 [History]


Furosemide [Lasix] 40 mg PO Q48H 18 [History]


Potassium Chloride [Klor-Con M20] 10 meq PO Q48H 18 [History]


Urea 1 applic TOP DAILY 18 [History]











Past Medical History


HEENT History: Reports: Cataract, Hard of Hearing, Impaired Vision, Sinusitis


Cardiovascular History: Reports: Blood Clots/VTE/DVT, Heart Murmur, High 

Cholesterol, PVD, SOB on Exertion, Other (See Below)


Other Cardiovascular History: on coumadin


Respiratory History: Reports: Bronchitis, Recurrent, PE, Pneumonia, Recurrent, 

SOB


Other Respiratory History: Chronic use of oxygen


Gastrointestinal History: Reports: Bowel Obstruction, Chronic Diarrhea, Fecal 

Incontinence, GERD


Genitourinary History: Reports: UTI, Recurrent, Other (See Below)


Other Genitourinary History: currnet dx of uti


OB/GYN History: Reports: Endometriosis, Pregnancy


Musculoskeletal History: Reports: Back Pain, Chronic, Fibromyalgia, Other (See 

Below)


Other Musculoskeletal History: POLYMYALGIA


Neurological History: Reports: Vertigo


Psychiatric History: Reports: Depression


Endocrine/Metabolic History: Reports: Hypothyroidism, Osteopenia, Osteoporosis


Hematologic History: Reports: Anemia, Anticoagulation Therapy, Blood Transfusion

(s), Other (See Below)


Other Hematologic History: Myelodysplastic Syndrome


Immunologic History: Reports: None


Oncologic (Cancer) History: Reports: None


Dermatologic History: Reports: None





- Infectious Disease History


Infectious Disease History: Reports: Chicken Pox


Other Infectious Disease History: Unable to assess.





- Past Surgical History


Head Surgeries/Procedures: Reports: None


HEENT Surgical History: Reports: Adenoidectomy, Cataract Surgery, Tonsillectomy


GI Surgical History: Reports: Appendectomy, Cholecystectomy, Colonoscopy, Other 

(See Below)


Endocrine Surgical History: Reports: None


Neurological Surgical History: Reports: None


Oncologic Surgical History: Reports: None





Social & Family History





- Family History


HEENT: Reports: None


Cardiac: Reports: MI (Father  heart disease)


GI: Reports: None


: Reports: Other (See Below)


OBGYN: Reports: None


Musculoskeletal: Reports: None


Neurological: Reports: None


Psychiatric: Reports: None


Endocrine/Metabolic: Reports: None


Hematologic: Reports: None


Immunologic: Reports: None


Dermatologic: Reports: None


Oncologic: Reports: Hodgkin's Lymphoma





- Tobacco Use


Smoking Status *Q: Never Smoker


Second Hand Smoke Exposure: No





- Caffeine Use


Caffeine Use: Reports: Coffee, Tea





- Alcohol Use


Days Per Week of Alcohol Use: 0





- Recreational Drug Use


Recreational Drug Use: No





H&P Review of Systems





- Review of Systems:


Review Of Systems: See Below


General: Reports: Malaise, Weakness


Pulmonary: Denies: Shortness of Breath, Cough, Sputum


Cardiovascular: Denies: Chest Pain


Gastrointestinal: Denies: Constipation, Distension


Genitourinary: Reports: Other (Indwelling Heard catheterization--dignity)


Musculoskeletal: Reports: Other (Generalize pain seems well controlled with low-

dose morphine)


Neurological: Reports: Pre-Existing Deficit, Weakness, Gait Disturbance


Hematologic/Lymphatic: Reports: Anemia, Easy Bruising





Exam





- Exam


Exam: See Below





- Vital Signs


Weight: 140 lb





- Exam


Quality Assessment: Supplemental Oxygen


General: Sedated


Neck: Supple


Lungs: Crackles


Cardiovascular: Regular Rhythm


GI/Abdominal Exam: Soft


 (Female) Exam: Deferred


Back Exam: No: CVA Tenderness (L), CVA Tenderness (R)


Extremities: Pedal Edema


Neurological: Normal Speech


Neuro Extensive - Mental Status: Alert


Psychiatric: Alert, Labile Mood


Problem List Initiated/Reviewed/Updated: Yes


Orders Last 24hrs: 


 Active Orders 24 hr











 Category Date Time Status


 


 Patient Status [ADT] Routine ADT  18 11:21 Ordered


 


 Communication Order [RC] QID Care  18 11:18 Active


 


 Heard Catheter Insertion [Insert Urinary Catheter] [OM. Care  18 11:18 

Ordered





 PC] Q24H   


 


 Oxygen Therapy [RC] .PRN Care  18 11:18 Active


 


 Up With Assistance [RC] ASDIRECTED Care  18 11:18 Active


 


 Urinary Catheter Assessment [RC] 0100,0900,1700 Care  18 11:18 Active


 


 Vital Signs [RC] .PRN Care  18 11:18 Active


 


 Regular Diet [DIET] Diet  18 Lunch Active


 


 Acetaminophen [Tylenol] Med  18 11:18 Active





 650 mg PO Q4H PRN   


 


 Glycopyrrolate [Robinul] Med  18 11:18 Active





 1 mg PO TID PRN   


 


 Melatonin Med  18 11:18 Active





 3 mg PO BEDTIME PRN   


 


 Mirtazapine [Remeron] Med  18 21:00 Active





 15 mg PO BEDTIME   


 


 Morphine Med  18 11:18 Active





 2 mg SUBCUT Q1H PRN   


 


 Urea [Urea 20% Crm] Med  18 09:00 Active





 0 gm TOP DAILY   


 


 prednisoLONE Acetate [Pred Forte 1% Ophth Susp] Med  18 21:00 Active





 0 ml EYEBOTH BID   


 


 Comfort Measures [OM.PC] Routine Oth  18 11:18 Ordered


 


 Comfort Measures [OM.PC] Routine Oth  18 11:22 Ordered


 


 Code Status [Resuscitation Status] Routine Resus Stat  18 11:23 Ordered








 Medication Orders





Acetaminophen (Tylenol)  650 mg PO Q4H PRN


   PRN Reason: Pain


Glycopyrrolate (Robinul)  1 mg PO TID PRN


   PRN Reason: secretions


Melatonin (Melatonin)  3 mg PO BEDTIME PRN


   PRN Reason: Insomnia


Mirtazapine (Remeron)  15 mg PO BEDTIME [facility]


Morphine Sulfate (Morphine)  2 mg SUBCUT Q1H PRN


   PRN Reason: pain/sob


Prednisolone Acetate (Pred Forte 1% Ophth Susp)  0 ml EYEBOTH BID [facility]


Urea (Urea 20% Crm)  0 gm TOP DAILY [facility]








Assessment/Plan Comment:: 


HISTORY OF PRESENT ILLNESS


83 year old female who is a resident of a long-term care facility that was 

initially admitted through the ED in acute care status for complaints of 

generalized weakness with nausea and vomiting. Due to MDS patient has had 

multiple blood transfusions however she was requiring a more frequently--thus 

has developed subsequent antibodies making any future transfusions more 

difficult for compatibility especially the possibility of a delayed immune 

blood transfusion reaction.  She last received 1 unit of PRBCs on 3/17/18. Her 

hemoglobin in the ER was 6.3. She was diagnosed the day prior with a UTI and 

placed on antibiotics. She reported that she felt worse after starting the 

antibiotics and was found to have a temperature of 102.0  Patient was found to 

have a slightly elevated troponin in the ED, but denied chest pain. During her 

acute care stay she did have ongoing heart failure with subsequent non-STEMI 

with cardiogenic shock requiring pressors did improve her situation clinically 

however however the family eventually decided to discontinue pressures in place 

her comfort care. Family opted  private pay swing bed here at [facility]





________________________________________________________________________________

____________________________________________________





PRIMARY ASSESSMENT/PLAN: 





Anemia, profound, comfort measures, forego all blood transfusions





Cardiogenic shock resolved, MAP adequate without pressors. Comfort measures  

All non-essential medications were discontinued.  





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





CKD stage 3. Most recent BUN 28, creatinine 1.34, GFR 38. Dignity indwelling 

catheter





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 Last hgb of 6.1. No longer 

lab draws





UTI, resolved. 








SECONDARY ASSESSMENT/PLAN: 


History of DVTs and PE. Coumadin discontinued. 


History of hypertension. 


History of MI.


CAD.


History of hyperlipidemia. 


Insomnia. Continue melatonin and mirtazipine.


GERD. Discontinued omeprazole. Zofran IV as needed for nausea. 


Depression. 


Giant cell arteritis. 


Lumbago with compression fractures. 


History of gout. 


History of iron overload. 


Polymyalgia rheumatica. 


DVT prophylaxis. Comfort measures only. 





Overall treatment plan: Discussion with family regarding treatment plan, 

comfort measures, placed in swing bed private pay. family is in agreement with 

plan.

## 2018-03-27 NOTE — PCM.DCSUM1
**Discharge Summary





- Hospital Course


Brief History: 83 year old female who is a resident of a long-term care 

facility that was initially admitted through the ED in acute care status for 

complaints of generalized weakness with nausea and vomiting. Due to MDS patient 

has had multiple blood transfusions however she was requiring a more frequently-

-thus has developed subsequent antibodies making any future transfusions more 

difficult for compatibility especially the possibility of a delayed immune 

blood transfusion reaction.  She last received 1 unit of PRBCs on 3/17/18. Her 

hemoglobin in the ER was 6.3. She was diagnosed the day prior with a UTI and 

placed on antibiotics. She reported that she felt worse after starting the 

antibiotics and was found to have a temperature of 102.0  Patient was found to 

have a slightly elevated troponin in the ED, but denied chest pain. During her 

acute care stay she did have ongoing heart failure with subsequent non-STEMI 

with cardiogenic shock requiring pressors did improve her situation clinically 

however however the family eventually decided to discontinue pressures in place 

her comfort care. Family opted  private pay swing bed here at [facility]





- Discharge Data


Discharge Date: 03/27/18


Discharge Disposition: DC/[facility] W/I Hosp To [place] [id number]


Condition: Good





- Discharge Plan


Home Medications: 


 Home Meds





Cholecalciferol (Vitamin D3) [Vitamin D3] 2,000 unit PO DAILY 12/27/13 [History]


Cyanocobalamin (Vitamin B-12) [B-12 Dots] 500 mcg PO MOTUWETHFR@0800 12/27/13 [

History]


Calcium Carbonate/Vitamin D3 [Calcium 600-Vit D3 400 Tablet] 1 tab PO BID@1200,

1800 12/01/15 [History]


Mirtazapine [Remeron] 15 mg PO BEDTIME 04/29/16 [History]


Phytonadione [Vitamin K] 100 mcg PO DAILY 04/29/16 [History]


Pyridoxine HCl [Vitamin B-6] 100 mg PO DAILY 04/29/16 [History]


Aspirin [Halfprin] 81 mg PO DAILY #90 tab.ec 05/05/17 [Rx]


Lactobacillus Acidophilus [Acidophilus Lactobacillus] 1 cap PO DAILY 05/09/17 [

History]


Levothyroxine Sodium [Synthroid] 125 mcg PO ACBREAKFAST 05/09/17 [History]


Melatonin/Pyridoxine HCl (B6) [Melatonin 3 mg Tablet] 3 mg PO BEDTIME PRN 05/09/ 17 [History]


Pantoprazole Sodium [Protonix] 20 mg PO ACBREAKFAST 06/04/17 [History]


Warfarin [Coumadin] 5 mg PO SUMOTUWETHSA@1800 07/21/17 [History]


predniSONE [Prednisone] 2 mg PO DAILY@1800 07/21/17 [History]


prednisoLONE Acetate [Pred Forte 1% Ophth Susp] 1 drop EYEBOTH BID 07/21/17 [

History]


Allopurinol [Zyloprim] 100 mg PO DAILY 11/21/17 [History]


Denosumab [Prolia] 60 mg SUBCUT ASDIRECTED 11/21/17 [History]


oxyCODONE 5 mg PO BID 11/21/17 [History]


Warfarin [Coumadin] 2.5 mg PO FR@1800 03/13/18 [History]


Acetaminophen 650 mg PO Q4H PRN 03/22/18 [History]


Cephalexin 500 mg PO BID 03/22/18 [History]


Deferasirox [Exjade] 1,000 mg PO DAILY@0600 03/22/18 [History]


Furosemide [Lasix] 40 mg PO Q48H 03/22/18 [History]


Potassium Chloride [Klor-Con M20] 10 meq PO Q48H 03/22/18 [History]


Urea 1 applic TOP DAILY 03/22/18 [History]








Forms:  ED Department Discharge





- Discharge Summary/Plan Comment


DC Time >30 min.: Yes


Discharge Summary/Plan Comment: 


Disposition to the patient's weakness and anemia refractory to blood 

transfusions long discussion with patient and they desire swing bed private pay 

and remain at [facility]











Final diagnosis





Weakness, profound


Anemia, due to mild dysplastic syndrome


Cardiogenic shock resolved, 


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


CKD stage 3. 


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 








- Patient Data


Vitals - Most Recent: 


 Last Vital Signs











Temp  97.6 F   03/27/18 07:00


 


Pulse  64   03/27/18 07:00


 


Resp  20   03/27/18 07:00


 


BP  95/42 L  03/27/18 07:00


 


Pulse Ox  97   03/27/18 09:00











Weight - Most Recent: 140 lb


I&O - Last 24 hours: 


 Intake & Output











 03/26/18 03/27/18 03/27/18





 22:59 06:59 14:59


 


Intake Total 428  100


 


Output Total 400  375


 


Balance 28  -275











ANDRIY Results - Last 24 hrs: 


 Microbiology











 03/21/18 22:55 Aerobic Blood Culture - Final





 Blood - Venous - Lab Draw    NO GROWTH AFTER 5 DAYS





 Anaerobic Blood Culture - Final





    NO GROWTH AFTER 5 DAYS


 


 03/21/18 21:50 Aerobic Blood Culture - Final





 Blood - Venous    NO GROWTH AFTER 5 DAYS





 Anaerobic Blood Culture - Final





    NO GROWTH AFTER 5 DAYS











Med Orders - Current: 


 Current Medications








Discontinued Medications





Acetaminophen (Tylenol)  650 mg PO Q4H PRN


   PRN Reason: Pain (Mild 1-3)/fever


Acetaminophen (Tylenol)  650 mg PO Q4H PRN


   PRN Reason: Pain


   Last Admin: 03/24/18 09:51 Dose:  650 mg


Allopurinol (Zyloprim)  100 mg PO DAILY [facility]


   Last Admin: 03/25/18 09:45 Dose:  100 mg


Aspirin (Halfprin)  81 mg PO DAILY [facility]


   Last Admin: 03/25/18 09:45 Dose:  81 mg


Atropine Sulfate (Atropine 0.1 Mg/Ml)  0 mg IVPUSH ASDIRECTED PRN


   PRN Reason: Heart


Bisacodyl (Dulcolax)  10 mg RECTAL DAILY PRN


   PRN Reason: Constipation


Calcium Citrate (Calcium Citrate + D)  2 tab PO BID@1200,1800 [facility]


   Last Admin: 03/24/18 19:34 Dose:  Not Given


Cholecalciferol (Vitamin D3)  2,000 units PO DAILY [facility]


   Last Admin: 03/24/18 10:24 Dose:  Not Given


Cyanocobalamin (Vitamin B12)  500 mcg PO MOTUWETHFR@0800 [facility]


   Last Admin: 03/23/18 08:06 Dose:  500 mcg


Epinephrine HCl (Epinephrine 1:10,000)  1 mg IVPUSH ASDIRECTED PRN


   PRN Reason: Heart


Furosemide (Lasix)  40 mg IVPUSH NOW ONE


   Stop: 03/23/18 23:11


   Last Admin: 03/23/18 23:14 Dose:  40 mg


Furosemide (Lasix)  20 mg IVPUSH NOW ONE


   Stop: 03/24/18 09:33


   Last Admin: 03/24/18 10:11 Dose:  20 mg


Glycopyrrolate (Robinul)  1 mg PO TID PRN


   PRN Reason: secretions


Sodium Chloride (Normal Saline)  1,000 mls @ 150 mls/hr IV ASDIRECTED [facility]


   Last Infusion: 03/21/18 23:40 Dose:  100 mls/hr


Levofloxacin/Dextrose 500 mg/ (Premix)  100 mls @ 100 mls/hr IV ONETIME ONE


   Stop: 03/22/18 00:01


   Last Admin: 03/21/18 23:43 Dose:  100 mls/hr


Sodium Chloride (Normal Saline)  1,000 mls @ 100 mls/hr IV ASDIRECTED [facility]


   Last Admin: 03/22/18 15:32 Dose:  100 mls/hr


Sodium Chloride (Normal Saline)  1,000 mls @ 40 mls/hr IV ASDIRECTED [facility]


   Last Admin: 03/22/18 20:04 Dose:  40 mls/hr


Sodium Chloride (Normal Saline)  250 mls @ 999 mls/hr IV ONETIME ONE


   Stop: 03/22/18 11:45


   Last Admin: 03/22/18 11:30 Dose:  999 mls/hr


Levofloxacin/Dextrose (Levaquin In D5w 250 Mg/50 Ml)  50 mls @ 50 mls/hr IV 

Q24H [facility]


   Last Admin: 03/23/18 15:52 Dose:  50 mls/hr


Heparin Sodium/Dextrose ()  25,000 units in 250 mls @ 8 mls/hr IV TITRATE [facility]; 

Protocol


Dopamine HCl/Dextrose (Dopamine In D5w 400 Mg/250 Ml)  400 mg in 250 mls @ 4.79 

mls/hr IV TITRATE [facility]; Protocol


   Last Admin: 03/25/18 19:06 Dose:  4 mcg/kg/min, 9.58 mls/hr


Lactobacillus Acidophilus/Rhamnosus (Multi-Cynthia Plus)  1 cap PO DAILY [facility]


   Last Admin: 03/24/18 10:24 Dose:  Not Given


Levothyroxine Sodium (Synthroid)  125 mcg PO ACBREAKFAST [facility]


   Last Admin: 03/26/18 07:11 Dose:  125 mcg


Lidocaine HCl (Xylocaine 2%)  0 mg IVPUSH ASDIRECTED PRN


   PRN Reason: Heart


Lorazepam (Ativan)  0.25 mg IVPUSH ONETIME ONE


   Stop: 03/23/18 22:46


   Last Admin: 03/23/18 22:59 Dose:  0.25 mg


Lorazepam (Ativan) Confirm Administered Dose 2 mg .ROUTE .STK-MED ONE


   Stop: 03/23/18 22:49


   Last Admin: 03/23/18 23:00 Dose:  Not Given


Lorazepam (Ativan)  1 mg IVPUSH Q4H PRN


   PRN Reason: Anxiety


Melatonin (Melatonin)  3 mg PO BEDTIME PRN


   PRN Reason: Insomnia


   Last Admin: 03/25/18 21:06 Dose:  3 mg


Mirtazapine (Remeron)  15 mg PO BEDTIME [facility]


   Last Admin: 03/26/18 21:42 Dose:  15 mg


Morphine Sulfate (Morphine)  2 mg IVPUSH Q1H PRN


   PRN Reason: pain/sob


Morphine Sulfate (Morphine)  2 mg SUBCUT Q1H PRN


   PRN Reason: pain/sob


   Last Admin: 03/26/18 23:34 Dose:  2 mg


Morphine Sulfate (Morphine)  2 mg SUBCUT Q1H PRN


   PRN Reason: pain/sob


Nitroglycerin (Nitrostat)  0.4 mg SL ASDIRECTED PRN


   PRN Reason: Heart


Omeprazole (Omeprazole)  20 mg PO ACBREAKFAST [facility]


   Last Admin: 03/26/18 07:11 Dose:  20 mg


Ondansetron HCl (Zofran)  4 mg IV Q6H PRN


   PRN Reason: Nausea/Vomiting


   Last Admin: 03/22/18 08:52 Dose:  4 mg


Ondansetron HCl (Zofran)  4 mg IVPUSH Q6H PRN


   PRN Reason: Nausea/Vomiting


   Last Admin: 03/25/18 07:45 Dose:  4 mg


Oxycodone HCl (Oxycodone)  5 mg PO BID [facility]


   Last Admin: 03/27/18 10:07 Dose:  5 mg


Deferasirox [Exjade] (500 Mg Tab)  2 each PO DAILY@0600 [facility]


   Last Admin: 03/25/18 06:26 Dose:  2 each


Phytonadione (Vitamin K)  100 mcg PO DAILY [facility]


   Last Admin: 03/26/18 08:28 Dose:  100 mcg


Prednisolone Acetate (Pred Forte 1% Ophth Susp)  0 ml EYEBOTH BID [facility]


   Last Admin: 03/27/18 10:07 Dose:  1 drop


Prednisone (Prednisone)  2 mg PO DAILY@1800 [facility]


   Last Admin: 03/25/18 17:26 Dose:  2 mg


Pyridoxine HCl (Vitamin B6-Pyridoxine)  100 mg PO DAILY [facility]


   Last Admin: 03/24/18 10:24 Dose:  Not Given


Sodium Chloride (Syrex Flush)  5 ml FLUSH Q8HR PRN


   PRN Reason: Keep Vein Open


Sodium Chloride (Syrex Flush)  5 ml FLUSH Q8HR PRN


   PRN Reason: Keep Vein Open


Urea (Urea 20% Crm)  0 gm TOP DAILY [facility]


   Last Admin: 03/27/18 10:08 Dose:  1 applic


Warfarin Sodium (Coumadin)  2.5 mg PO FR@1800 [facility]


   Last Admin: 03/23/18 18:04 Dose:  2.5 mg


Warfarin Sodium (Coumadin)  5 mg PO SUMOTUWETHSA@1800 [facility]


   Last Admin: 03/25/18 17:26 Dose:  5 mg

## 2018-03-28 ENCOUNTER — HOSPITAL ENCOUNTER (INPATIENT)
Dept: HOSPITAL 77 - KA.MS | Age: 83
LOS: 4 days | DRG: 285 | End: 2018-04-01
Attending: FAMILY MEDICINE | Admitting: NURSE PRACTITIONER
Payer: COMMERCIAL

## 2018-03-28 DIAGNOSIS — Z88.8: ICD-10-CM

## 2018-03-28 DIAGNOSIS — Z66: ICD-10-CM

## 2018-03-28 DIAGNOSIS — Z88.2: ICD-10-CM

## 2018-03-28 DIAGNOSIS — Z51.5: ICD-10-CM

## 2018-03-28 DIAGNOSIS — I21.9: Primary | ICD-10-CM

## 2018-03-28 DIAGNOSIS — D46.9: ICD-10-CM

## 2018-03-28 RX ADMIN — MORPHINE SULFATE SCH MG: 100 SOLUTION ORAL at 20:34

## 2018-03-28 RX ADMIN — MORPHINE SULFATE PRN MG: 100 SOLUTION ORAL at 22:57

## 2018-03-28 RX ADMIN — CARVEDILOL SCH: 12.5 TABLET, FILM COATED ORAL at 09:09

## 2018-03-28 RX ADMIN — MORPHINE SULFATE PRN MG: 100 SOLUTION ORAL at 19:12

## 2018-03-29 RX ADMIN — MORPHINE SULFATE SCH MG: 100 SOLUTION ORAL at 04:32

## 2018-03-29 RX ADMIN — CARVEDILOL SCH EACH: 12.5 TABLET, FILM COATED ORAL at 12:40

## 2018-03-29 RX ADMIN — CARVEDILOL SCH EACH: 12.5 TABLET, FILM COATED ORAL at 16:00

## 2018-03-29 RX ADMIN — MORPHINE SULFATE PRN MG: 100 SOLUTION ORAL at 01:54

## 2018-03-29 RX ADMIN — CARVEDILOL PRN EACH: 12.5 TABLET, FILM COATED ORAL at 14:51

## 2018-03-29 RX ADMIN — CARVEDILOL PRN EACH: 12.5 TABLET, FILM COATED ORAL at 10:28

## 2018-03-29 RX ADMIN — CARVEDILOL PRN EACH: 12.5 TABLET, FILM COATED ORAL at 12:40

## 2018-03-29 RX ADMIN — CARVEDILOL PRN EACH: 12.5 TABLET, FILM COATED ORAL at 16:00

## 2018-03-29 RX ADMIN — CARVEDILOL SCH EACH: 12.5 TABLET, FILM COATED ORAL at 19:10

## 2018-03-29 RX ADMIN — CARVEDILOL SCH EACH: 12.5 TABLET, FILM COATED ORAL at 23:51

## 2018-03-29 RX ADMIN — MORPHINE SULFATE SCH MG: 100 SOLUTION ORAL at 07:37

## 2018-03-29 RX ADMIN — CARVEDILOL PRN EACH: 12.5 TABLET, FILM COATED ORAL at 21:19

## 2018-03-29 RX ADMIN — CARVEDILOL PRN EACH: 12.5 TABLET, FILM COATED ORAL at 20:16

## 2018-03-29 RX ADMIN — CARVEDILOL PRN EACH: 12.5 TABLET, FILM COATED ORAL at 21:23

## 2018-03-29 RX ADMIN — MORPHINE SULFATE SCH: 100 SOLUTION ORAL at 00:55

## 2018-03-30 RX ADMIN — CARVEDILOL PRN EACH: 12.5 TABLET, FILM COATED ORAL at 10:12

## 2018-03-30 RX ADMIN — CARVEDILOL PRN EACH: 12.5 TABLET, FILM COATED ORAL at 04:56

## 2018-03-30 RX ADMIN — CARVEDILOL SCH EACH: 12.5 TABLET, FILM COATED ORAL at 07:19

## 2018-03-30 RX ADMIN — CARVEDILOL PRN EACH: 12.5 TABLET, FILM COATED ORAL at 22:13

## 2018-03-30 RX ADMIN — CARVEDILOL PRN EACH: 12.5 TABLET, FILM COATED ORAL at 10:14

## 2018-03-30 RX ADMIN — CARVEDILOL SCH EACH: 12.5 TABLET, FILM COATED ORAL at 03:10

## 2018-03-30 RX ADMIN — CARVEDILOL SCH EACH: 12.5 TABLET, FILM COATED ORAL at 20:11

## 2018-03-30 RX ADMIN — CARVEDILOL SCH EACH: 12.5 TABLET, FILM COATED ORAL at 11:49

## 2018-03-30 RX ADMIN — CARVEDILOL SCH EACH: 12.5 TABLET, FILM COATED ORAL at 15:53

## 2018-03-30 RX ADMIN — CARVEDILOL PRN EACH: 12.5 TABLET, FILM COATED ORAL at 17:46

## 2018-03-30 RX ADMIN — CARVEDILOL PRN EACH: 12.5 TABLET, FILM COATED ORAL at 05:03

## 2018-03-30 RX ADMIN — CARVEDILOL SCH: 12.5 TABLET, FILM COATED ORAL at 08:51

## 2018-03-30 RX ADMIN — CARVEDILOL PRN EACH: 12.5 TABLET, FILM COATED ORAL at 17:48

## 2018-03-31 RX ADMIN — CARVEDILOL PRN EACH: 12.5 TABLET, FILM COATED ORAL at 20:00

## 2018-03-31 RX ADMIN — CARVEDILOL PRN EACH: 12.5 TABLET, FILM COATED ORAL at 08:01

## 2018-03-31 RX ADMIN — CARVEDILOL PRN EACH: 12.5 TABLET, FILM COATED ORAL at 02:30

## 2018-03-31 RX ADMIN — CARVEDILOL SCH EACH: 12.5 TABLET, FILM COATED ORAL at 19:58

## 2018-03-31 RX ADMIN — CARVEDILOL SCH EACH: 12.5 TABLET, FILM COATED ORAL at 15:53

## 2018-03-31 RX ADMIN — CARVEDILOL SCH: 12.5 TABLET, FILM COATED ORAL at 08:11

## 2018-03-31 RX ADMIN — CARVEDILOL PRN EACH: 12.5 TABLET, FILM COATED ORAL at 15:53

## 2018-03-31 RX ADMIN — CARVEDILOL PRN EACH: 12.5 TABLET, FILM COATED ORAL at 18:18

## 2018-03-31 RX ADMIN — CARVEDILOL SCH EACH: 12.5 TABLET, FILM COATED ORAL at 04:43

## 2018-03-31 RX ADMIN — CARVEDILOL PRN EACH: 12.5 TABLET, FILM COATED ORAL at 11:49

## 2018-03-31 RX ADMIN — CARVEDILOL SCH EACH: 12.5 TABLET, FILM COATED ORAL at 11:35

## 2018-03-31 RX ADMIN — CARVEDILOL SCH EACH: 12.5 TABLET, FILM COATED ORAL at 00:19

## 2018-03-31 RX ADMIN — CARVEDILOL PRN EACH: 12.5 TABLET, FILM COATED ORAL at 22:24

## 2018-03-31 RX ADMIN — CARVEDILOL SCH EACH: 12.5 TABLET, FILM COATED ORAL at 08:05

## 2018-03-31 RX ADMIN — CARVEDILOL PRN EACH: 12.5 TABLET, FILM COATED ORAL at 02:29

## 2018-04-01 RX ADMIN — CARVEDILOL PRN EACH: 12.5 TABLET, FILM COATED ORAL at 16:09

## 2018-04-01 RX ADMIN — CARVEDILOL SCH EACH: 12.5 TABLET, FILM COATED ORAL at 11:51

## 2018-04-01 RX ADMIN — CARVEDILOL SCH EACH: 12.5 TABLET, FILM COATED ORAL at 04:05

## 2018-04-01 RX ADMIN — CARVEDILOL PRN EACH: 12.5 TABLET, FILM COATED ORAL at 05:56

## 2018-04-01 RX ADMIN — CARVEDILOL PRN EACH: 12.5 TABLET, FILM COATED ORAL at 16:07

## 2018-04-01 RX ADMIN — CARVEDILOL SCH EACH: 12.5 TABLET, FILM COATED ORAL at 16:06

## 2018-04-01 RX ADMIN — CARVEDILOL SCH EACH: 12.5 TABLET, FILM COATED ORAL at 07:54

## 2018-04-01 RX ADMIN — CARVEDILOL SCH EACH: 12.5 TABLET, FILM COATED ORAL at 00:04

## 2018-04-01 RX ADMIN — CARVEDILOL PRN EACH: 12.5 TABLET, FILM COATED ORAL at 04:05

## 2018-04-01 RX ADMIN — CARVEDILOL PRN EACH: 12.5 TABLET, FILM COATED ORAL at 18:16

## 2018-04-01 RX ADMIN — CARVEDILOL PRN EACH: 12.5 TABLET, FILM COATED ORAL at 07:55

## 2018-04-01 RX ADMIN — CARVEDILOL PRN EACH: 12.5 TABLET, FILM COATED ORAL at 00:04

## 2018-04-01 RX ADMIN — CARVEDILOL PRN EACH: 12.5 TABLET, FILM COATED ORAL at 14:32

## 2018-04-01 RX ADMIN — CARVEDILOL PRN EACH: 12.5 TABLET, FILM COATED ORAL at 08:07

## 2018-04-01 RX ADMIN — CARVEDILOL PRN EACH: 12.5 TABLET, FILM COATED ORAL at 10:09

## 2018-04-01 RX ADMIN — CARVEDILOL PRN EACH: 12.5 TABLET, FILM COATED ORAL at 11:51

## 2018-04-01 RX ADMIN — CARVEDILOL SCH: 12.5 TABLET, FILM COATED ORAL at 09:36

## 2018-04-01 RX ADMIN — CARVEDILOL PRN EACH: 12.5 TABLET, FILM COATED ORAL at 02:29

## 2018-04-02 NOTE — PCM.DCSUM1
**Discharge Summary





- Hospital Course


Brief History: 83 year old female who is a resident of a long-term care 

facility that was initially admitted through the ED in acute care status for 

complaints of generalized weakness with nausea and vomiting. Due to MDS patient 

has had multiple blood transfusions however she was requiring a more frequently-

-thus has developed subsequent antibodies making any future transfusions more 

difficult for compatibility especially the possibility of a delayed immune 

blood transfusion reaction.  She last received 1 unit of PRBCs on 3/17/18. Her 

hemoglobin in the ER was 6.3. She was diagnosed the day prior with a UTI and 

placed on antibiotics. She reported that she felt worse after starting the 

antibiotics and was found to have a temperature of 102.0  Patient was found to 

have a slightly elevated troponin in the ED, but denied chest pain. During her 

acute care stay she did have ongoing heart failure with subsequent non-STEMI 

with cardiogenic shock requiring pressors did improve her situation clinically 

however the family eventually decided to discontinue pressures in place her one 

point and in comfort cares and then decided to place her into hospice care.





- Discharge Data


Discharge Date: 18


Discharge Disposition:  20


Condition: Serious





- Patient Summary/Data


Complications: Patient did lose her IUD while in swing bed however since placed 

in hospice care only palliative care treatment was given.


Hospital Course: 


Patient's hospital course in hospice care went as expected.  Patient was 

managed by the hospice nurse regarding any anxiety and pain control 

medications.  Patient began to have decreased oral intake less responsiveness 

over time--however she was conversing with family and friends up until 

approximately 48 hours prior to her death.  Family held [place]ane[place] was close by her 

upon her death.








Final diagnosis





Anemia, due to myelodysplastic syndrome


Cardiogenic shock 


Acute on chronic combined congestive heart failure 


CKD stage 3




















- Discharge Plan


Home Medications: 


 Home Meds





Cholecalciferol (Vitamin D3) [Vitamin D3] 2,000 unit PO DAILY 13 [History]


Cyanocobalamin (Vitamin B-12) [B-12 Dots] 500 mcg PO MOTUWETHFR@0800 13 [

History]


Calcium Carbonate/Vitamin D3 [Calcium 600-Vit D3 400 Tablet] 1 tab PO BID@1200,

1800 12/01/15 [History]


Mirtazapine [Remeron] 15 mg PO BEDTIME 16 [History]


Phytonadione [Vitamin K] 100 mcg PO DAILY 16 [History]


Pyridoxine HCl [Vitamin B-6] 100 mg PO DAILY 16 [History]


Aspirin [Halfprin] 81 mg PO DAILY #90 tab.ec 17 [Rx]


Lactobacillus Acidophilus [Acidophilus Lactobacillus] 1 cap PO DAILY 17 [

History]


Levothyroxine Sodium [Synthroid] 125 mcg PO ACBREAKFAST 17 [History]


Melatonin/Pyridoxine HCl (B6) [Melatonin 3 mg Tablet] 3 mg PO BEDTIME PRN  [History]


Pantoprazole Sodium [Protonix] 20 mg PO ACBREAKFAST 17 [History]


Warfarin [Coumadin] 5 mg PO SUMOTUWETHSA@1800 17 [History]


predniSONE [Prednisone] 2 mg PO DAILY@1800 17 [History]


prednisoLONE Acetate [Pred Forte 1% Ophth Susp] 1 drop EYEBOTH BID 17 [

History]


Allopurinol [Zyloprim] 100 mg PO DAILY 17 [History]


Denosumab [Prolia] 60 mg SUBCUT ASDIRECTED 17 [History]


oxyCODONE 5 mg PO BID 17 [History]


Warfarin [Coumadin] 2.5 mg PO FR@1800 18 [History]


Acetaminophen 650 mg PO Q4H PRN 18 [History]


Cephalexin 500 mg PO BID 18 [History]


Deferasirox [Exjade] 1,000 mg PO DAILY@0600 18 [History]


Furosemide [Lasix] 40 mg PO Q48H 18 [History]


Potassium Chloride [Klor-Con M20] 10 meq PO Q48H 18 [History]


Urea 1 applic TOP DAILY 18 [History]











- Discharge Summary/Plan Comment


DC Time >30 min.: No





- Patient Data


Vitals - Most Recent: 


 Last Vital Signs











Temp  98.3 F   18 22:51


 


Pulse  96   18 22:51


 


Resp  20   18 15:08


 


BP  85/45 L  18 22:51


 


Pulse Ox  60 L  18 06:41











Weight - Most Recent: 140 lb


Med Orders - Current: 


 Current Medications








Discontinued Medications





Acetaminophen (Tylenol)  650 mg PO Q4H PRN


   PRN Reason: Pain


Bisacodyl (Dulcolax)  10 mg RECTAL DAILY PRN


   PRN Reason: Constipation


   Last Admin: 18 15:10 Dose:  10 mg


Glycopyrrolate (Robinul)  1 mg PO TID PRN


   PRN Reason: secretions


Melatonin (Melatonin)  3 mg PO BEDTIME PRN


   PRN Reason: Insomnia


Mirtazapine (Remeron)  15 mg PO BEDTIME [facility]


   Last Admin: 18 21:51 Dose:  15 mg


Morphine Sulfate (Morphine)  2 mg SUBCUT Q1H PRN


   PRN Reason: pain/sob


   Last Admin: 18 16:46 Dose:  2 mg


Ondansetron HCl (Zofran Odt)  4 mg PO Q4H PRN


   PRN Reason: Nausea/Vomiting


Oxycodone HCl (Oxycodone)  5 mg PO BID [facility]


**Ptom**Prednisolone Acetate 1% Ophth Susp 5 Ml Bottle  0 each EYEBOTH BID [facility]


   Last Admin: 18 09:09 Dose:  Not Given


**Ptom**Urea 20% (Cream 85 Gm Tube)  0 each TOP DAILY [facility]


   Last Admin: 18 09:09 Dose:  Not Given


Prednisolone Acetate (Pred Forte 1% Ophth Susp)  0 ml EYEBOTH BID [facility]


Sodium Chloride (Syrex Flush)  5 ml FLUSH Q8HR PRN


   PRN Reason: Keep Vein Open


Urea (Urea 20% Crm)  0 gm TOP DAILY [facility]